# Patient Record
Sex: FEMALE | Race: WHITE | Employment: UNEMPLOYED | URBAN - METROPOLITAN AREA
[De-identification: names, ages, dates, MRNs, and addresses within clinical notes are randomized per-mention and may not be internally consistent; named-entity substitution may affect disease eponyms.]

---

## 2017-08-30 ENCOUNTER — HOSPITAL ENCOUNTER (EMERGENCY)
Age: 52
Discharge: HOME | End: 2017-08-30
Attending: EMERGENCY MEDICINE
Payer: COMMERCIAL

## 2017-08-30 VITALS
OXYGEN SATURATION: 98 % | SYSTOLIC BLOOD PRESSURE: 137 MMHG | DIASTOLIC BLOOD PRESSURE: 67 MMHG | HEART RATE: 82 BPM | RESPIRATION RATE: 18 BRPM | BODY MASS INDEX: 39.27 KG/M2 | TEMPERATURE: 99 F | WEIGHT: 230 LBS | HEIGHT: 64 IN

## 2017-08-30 DIAGNOSIS — R07.9 CHEST PAIN, UNSPECIFIED TYPE: Primary | ICD-10-CM

## 2017-08-30 DIAGNOSIS — F14.90 COCAINE USE: ICD-10-CM

## 2017-08-30 LAB
ALBUMIN SERPL-MCNC: 3.7 G/DL (ref 3.5–5)
ALBUMIN/GLOB SERPL: 1 {RATIO} (ref 1.1–2.2)
ALP SERPL-CCNC: 62 U/L (ref 45–117)
ALT SERPL-CCNC: 35 U/L (ref 12–78)
AMPHET UR QL SCN: NEGATIVE
ANION GAP SERPL CALC-SCNC: 10 MMOL/L (ref 5–15)
AST SERPL-CCNC: 25 U/L (ref 15–37)
ATRIAL RATE: 80 BPM
BARBITURATES UR QL SCN: NEGATIVE
BASOPHILS # BLD: 0 K/UL (ref 0–0.1)
BASOPHILS NFR BLD: 1 % (ref 0–1)
BENZODIAZ UR QL: NEGATIVE
BILIRUB SERPL-MCNC: 0.4 MG/DL (ref 0.2–1)
BUN SERPL-MCNC: 18 MG/DL (ref 6–20)
BUN/CREAT SERPL: 23 (ref 12–20)
CALCIUM SERPL-MCNC: 9 MG/DL (ref 8.5–10.1)
CALCULATED P AXIS, ECG09: 58 DEGREES
CALCULATED R AXIS, ECG10: -32 DEGREES
CALCULATED T AXIS, ECG11: 114 DEGREES
CANNABINOIDS UR QL SCN: NEGATIVE
CHLORIDE SERPL-SCNC: 106 MMOL/L (ref 97–108)
CO2 SERPL-SCNC: 27 MMOL/L (ref 21–32)
COCAINE UR QL SCN: POSITIVE
CREAT SERPL-MCNC: 0.77 MG/DL (ref 0.55–1.02)
DIAGNOSIS, 93000: NORMAL
DRUG SCRN COMMENT,DRGCM: ABNORMAL
EOSINOPHIL # BLD: 0.1 K/UL (ref 0–0.4)
EOSINOPHIL NFR BLD: 1 % (ref 0–7)
ERYTHROCYTE [DISTWIDTH] IN BLOOD BY AUTOMATED COUNT: 14.6 % (ref 11.5–14.5)
GLOBULIN SER CALC-MCNC: 3.8 G/DL (ref 2–4)
GLUCOSE SERPL-MCNC: 196 MG/DL (ref 65–100)
HCT VFR BLD AUTO: 41.7 % (ref 35–47)
HGB BLD-MCNC: 13.7 G/DL (ref 11.5–16)
LYMPHOCYTES # BLD: 2.3 K/UL (ref 0.8–3.5)
LYMPHOCYTES NFR BLD: 26 % (ref 12–49)
MCH RBC QN AUTO: 29.4 PG (ref 26–34)
MCHC RBC AUTO-ENTMCNC: 32.9 G/DL (ref 30–36.5)
MCV RBC AUTO: 89.5 FL (ref 80–99)
METHADONE UR QL: NEGATIVE
MONOCYTES # BLD: 0.6 K/UL (ref 0–1)
MONOCYTES NFR BLD: 7 % (ref 5–13)
NEUTS SEG # BLD: 5.8 K/UL (ref 1.8–8)
NEUTS SEG NFR BLD: 65 % (ref 32–75)
OPIATES UR QL: NEGATIVE
P-R INTERVAL, ECG05: 170 MS
PCP UR QL: NEGATIVE
PLATELET # BLD AUTO: 264 K/UL (ref 150–400)
POTASSIUM SERPL-SCNC: 4 MMOL/L (ref 3.5–5.1)
PROT SERPL-MCNC: 7.5 G/DL (ref 6.4–8.2)
Q-T INTERVAL, ECG07: 404 MS
QRS DURATION, ECG06: 118 MS
QTC CALCULATION (BEZET), ECG08: 465 MS
RBC # BLD AUTO: 4.66 M/UL (ref 3.8–5.2)
SODIUM SERPL-SCNC: 143 MMOL/L (ref 136–145)
TROPONIN I SERPL-MCNC: <0.04 NG/ML
TROPONIN I SERPL-MCNC: <0.04 NG/ML
VENTRICULAR RATE, ECG03: 80 BPM
WBC # BLD AUTO: 8.9 K/UL (ref 3.6–11)

## 2017-08-30 PROCEDURE — 80053 COMPREHEN METABOLIC PANEL: CPT | Performed by: EMERGENCY MEDICINE

## 2017-08-30 PROCEDURE — 36415 COLL VENOUS BLD VENIPUNCTURE: CPT | Performed by: EMERGENCY MEDICINE

## 2017-08-30 PROCEDURE — 80307 DRUG TEST PRSMV CHEM ANLYZR: CPT | Performed by: EMERGENCY MEDICINE

## 2017-08-30 PROCEDURE — 85025 COMPLETE CBC W/AUTO DIFF WBC: CPT | Performed by: EMERGENCY MEDICINE

## 2017-08-30 PROCEDURE — 99285 EMERGENCY DEPT VISIT HI MDM: CPT

## 2017-08-30 PROCEDURE — 93005 ELECTROCARDIOGRAM TRACING: CPT

## 2017-08-30 PROCEDURE — 84484 ASSAY OF TROPONIN QUANT: CPT | Performed by: EMERGENCY MEDICINE

## 2017-08-30 PROCEDURE — 74011250637 HC RX REV CODE- 250/637: Performed by: EMERGENCY MEDICINE

## 2017-08-30 RX ORDER — CLOPIDOGREL BISULFATE 75 MG/1
300 TABLET ORAL
Status: COMPLETED | OUTPATIENT
Start: 2017-08-30 | End: 2017-08-30

## 2017-08-30 RX ADMIN — CLOPIDOGREL 300 MG: 75 TABLET, FILM COATED ORAL at 11:25

## 2017-08-30 NOTE — ED PROVIDER NOTES
HPI Comments: 46 y.o. female with past medical history significant for coronary artery bypass graft (02/2017), hip fracture, diabetes, fibula fracture, and MI who presents from home with chief complaint of chest pain. Patient states onset of moderate, intermittent, and mid-sternal chest pain over the past 8 hours with accompanying shortness of breath. Patient also complains of associated left arm \"numbness\" stating she has trouble opening and closing her arm. Patient denies the numbness feeling like her arm falling asleep. Patient mentions hx of triple bypass at [facility] with a hx of myocardial infarction. Patient reports she typically takes Plavix and Aspirin daily. Patient notes she took a full aspirin 6 hours ago and did not take her Plavix today. Per nurse, patient apparently used crack cocaine last night, but patient does not use this on a daily basis. Patient denies having a headache. There are no other acute medical concerns at this time. Old Chart Review: Per note, patient was evaluated here on 12/14/16 for nausea and vomiting. Patient was apparently treated for a UTI and had some palpitations during this visit. Patient was discharged with Phenergan. Social hx: Tobacco Use: No (former smoker), Alcohol Use: No, Drug Use: Yes (cocaine)    PCP: Tyree Celaya MD    Note written by Rosemarie Alfaro, as dictated by Elena Key MD 10:58 AM      The history is provided by the patient and medical records. Past Medical History:   Diagnosis Date    Diabetes ([address]     Fibula fracture     right    Hip fracture (HCC)     Myocardial infarct [facility])        Past Surgical History:   Procedure Laterality Date    HX CERVICAL LAMINECTOMY      HX CORONARY ARTERY BYPASS GRAFT      February 2017    HX ORTHOPAEDIC      cervical fusion         History reviewed. No pertinent family history.     Social History     Social History    Marital status:      Spouse name: N/A   [facility] Number of children: N/A    Years of education: N/A     Occupational History    Not on file. Social History Main Topics    Smoking status: Former Smoker    Smokeless tobacco: Never Used    Alcohol use No    Drug use: Yes     Special: Cocaine    Sexual activity: No     Other Topics Concern    Not on file     Social History Narrative         ALLERGIES: Celexa [citalopram]; Diflucan [fluconazole]; Flagyl [metronidazole]; Lisinopril; Metformin; Neosporin [benzalkonium chloride]; Pcn [penicillins]; and Sulfa (sulfonamide antibiotics)    Review of Systems   Constitutional: Negative for chills and fever. HENT: Negative for ear pain and sore throat. Eyes: Negative for photophobia and pain. Respiratory: Positive for shortness of breath. Negative for chest tightness. Cardiovascular: Positive for chest pain. Negative for leg swelling. Gastrointestinal: Negative for abdominal pain, nausea and vomiting. Genitourinary: Negative for dysuria and flank pain. Musculoskeletal: Negative for back pain and neck pain. Skin: Negative for rash and wound. Neurological: Positive for numbness. Negative for dizziness, light-headedness and headaches. All other systems reviewed and are negative. Vitals:    08/30/17 1047   BP: 137/67   Pulse: 81   Resp: 18   Temp: 97.8 °F (36.6 °C)   SpO2: 95%   Weight: 104.3 kg (230 lb)   Height: 5' 4\" (1.626 m)            Physical Exam   Constitutional: She is oriented to person, place, and time. She appears well-developed and well-nourished. No distress. HENT:   Head: Normocephalic and atraumatic. Eyes: Conjunctivae and EOM are normal.   Neck: Normal range of motion. Cardiovascular: Normal rate, regular rhythm, normal heart sounds and intact distal pulses. No murmur heard. Pulmonary/Chest: Effort normal and breath sounds normal. No stridor. No respiratory distress. Abdominal: Soft. Bowel sounds are normal. There is no tenderness.    Musculoskeletal: Normal range of motion. She exhibits no edema, tenderness or deformity. Neurological: She is alert and oriented to person, place, and time. She has normal strength. No cranial nerve deficit or sensory deficit. GCS eye subscore is 4. GCS verbal subscore is 5. GCS motor subscore is 6. Skin: Skin is warm and dry. She is not diaphoretic. Psychiatric: She has a normal mood and affect. Nursing note and vitals reviewed. MDM  Number of Diagnoses or Management Options  Chest pain, unspecified type:   Cocaine use:   Diagnosis management comments: Patient with acute chest pain since early this morning - admitted to cocaine use last night. Patient with extensive cardiac history - check labs, EKG, troponin, give plavix as patient took 325mg aspirin about 5am.    1500 - repeat trop neg, +cocaine, patient advised to never use cocaine again. Amount and/or Complexity of Data Reviewed  Clinical lab tests: ordered and reviewed  Independent visualization of images, tracings, or specimens: yes    Patient Progress  Patient progress: stable    ED Course       Procedures    ED EKG interpretation:  Rhythm: normal sinus rhythm; and regular . Rate (approx.): 80; Axis: left axis deviation; ST/T wave: non-specific intraventricular conduction delay; ST &T wave abnormality, consider lateral ischemia. Prolonged QT. Abnormal EKG. No acute ST changes noted. Patient has no prior EKG in our system for comparison.   Note written by Rosemarie Stahl, as dictated by Salina Julien MD 10:51 AM

## 2017-08-30 NOTE — ED TRIAGE NOTES
Patient arrives from home for chest pain and shortness of breath that began at 0400 this morning. Reports using crack cocaine last night but does not use it on a regular basis.

## 2017-08-30 NOTE — ED NOTES
Patient ambulatory to restroom with assistance to attempt for a urine specimen. After several minutes patient states she is unable to provide a specimen. Will attempt again shortly.

## 2017-08-30 NOTE — DISCHARGE INSTRUCTIONS
Chest Pain: Care Instructions  Your Care Instructions  There are many things that can cause chest pain. Some are not serious and will get better on their own in a few days. But some kinds of chest pain need more testing and treatment. Your doctor may have recommended a follow-up visit in the next 8 to 12 hours. If you are not getting better, you may need more tests or treatment. Even though your doctor has released you, you still need to watch for any problems. The doctor carefully checked you, but sometimes problems can develop later. If you have new symptoms or if your symptoms do not get better, get medical care right away. If you have worse or different chest pain or pressure that lasts more than 5 minutes or you passed out (lost consciousness), call 911 or seek other emergency help right away. A medical visit is only one step in your treatment. Even if you feel better, you still need to do what your doctor recommends, such as going to all suggested follow-up appointments and taking medicines exactly as directed. This will help you recover and help prevent future problems. How can you care for yourself at home? · Rest until you feel better. · Take your medicine exactly as prescribed. Call your doctor if you think you are having a problem with your medicine. · Do not drive after taking a prescription pain medicine. When should you call for help? Call 911 if:  · You passed out (lost consciousness). · You have severe difficulty breathing. · You have symptoms of a heart attack. These may include:  ¨ Chest pain or pressure, or a strange feeling in your chest.  ¨ Sweating. ¨ Shortness of breath. ¨ Nausea or vomiting. ¨ Pain, pressure, or a strange feeling in your back, neck, jaw, or upper belly or in one or both shoulders or arms. ¨ Lightheadedness or sudden weakness. ¨ A fast or irregular heartbeat.   After you call 911, the  may tell you to chew 1 adult-strength or 2 to 4 low-dose aspirin. Wait for an ambulance. Do not try to drive yourself. Call your doctor today if:  · You have any trouble breathing. · Your chest pain gets worse. · You are dizzy or lightheaded, or you feel like you may faint. · You are not getting better as expected. · You are having new or different chest pain. Where can you learn more? Go to http://cintia-venancio.info/. Enter A120 in the search box to learn more about \"Chest Pain: Care Instructions. \"  Current as of: March 20, 2017  Content Version: 11.3  © 5352-6061 [employer]. Care instructions adapted under license by [employer] (which disclaims liability or warranty for this information). If you have questions about a medical condition or this instruction, always ask your healthcare professional. [address] any warranty or liability for your use of this information. We hope that we have addressed all of your medical concerns. The examination and treatment you received in the Emergency Department were for an emergent problem and were not intended as complete care. It is important that you follow up with your healthcare provider(s) for ongoing care. If your symptoms worsen or do not improve as expected, and you are unable to reach your usual health care provider(s), you should return to the Emergency Department. Today's healthcare is undergoing tremendous change, and patient satisfaction surveys are one of the many tools to assess the quality of medical care. You may receive a survey from the [employer] organization regarding your experience in the Emergency Department. I hope that your experience has been completely positive, particularly the medical care that I provided. As such, please participate in the survey; anything less than excellent does not meet my expectations or intentions.         [facility] and [employer] participate in nationally recognized quality of care measures. If your blood pressure is greater than 120/80, as reported below, we urge that you seek medical care to address the potential of high blood pressure, commonly known as hypertension. Hypertension can be hereditary or can be caused by certain medical conditions, pain, stress, or \"white coat syndrome. \"       Please make an appointment with your health care provider(s) for follow up of your Emergency Department visit. VITALS:   Patient Vitals for the past 8 hrs:   Temp Pulse Resp BP SpO2   08/30/17 1245 - 73 15 130/56 98 %   08/30/17 1115 - 78 14 125/65 93 %   08/30/17 1047 97.8 °F (36.6 °C) 84 20 141/67 93 %          Thank you for allowing us to provide you with medical care today. We realize that you have many choices for your emergency care needs. Please choose us in the future for any continued health care needs. Vianney Parker [facility], [address] [place] [address] 20.   Office: [phone]            Recent Results (from the past 24 hour(s))   TROPONIN I    Collection Time: 08/30/17 10:59 AM   Result Value Ref Range    Troponin-I, Qt. <0.04 <6.39 ng/mL   METABOLIC PANEL, COMPREHENSIVE    Collection Time: 08/30/17 10:59 AM   Result Value Ref Range    Sodium 143 136 - 145 mmol/L    Potassium 4.0 3.5 - 5.1 mmol/L    Chloride 106 97 - 108 mmol/L    CO2 27 21 - 32 mmol/L    Anion gap 10 5 - 15 mmol/L    Glucose 196 (H) 65 - 100 mg/dL    BUN 18 6 - 20 MG/DL    Creatinine 0.77 0.55 - 1.02 MG/DL    BUN/Creatinine ratio 23 (H) 12 - 20      GFR est AA >60 >60 ml/min/1.73m2    GFR est non-AA >60 >60 ml/min/1.73m2    Calcium 9.0 8.5 - 10.1 MG/DL    Bilirubin, total 0.4 0.2 - 1.0 MG/DL    ALT (SGPT) 35 12 - 78 U/L    AST (SGOT) 25 15 - 37 U/L    Alk.  phosphatase 62 45 - 117 U/L    Protein, total 7.5 6.4 - 8.2 g/dL    Albumin 3.7 3.5 - 5.0 g/dL    Globulin 3.8 2.0 - 4.0 g/dL    A-G Ratio 1.0 (L) 1.1 - 2.2     CBC WITH AUTOMATED DIFF Collection Time: 08/30/17 10:59 AM   Result Value Ref Range    WBC 8.9 3.6 - 11.0 K/uL    RBC 4.66 3.80 - 5.20 M/uL    HGB 13.7 11.5 - 16.0 g/dL    HCT 41.7 35.0 - 47.0 %    MCV 89.5 80.0 - 99.0 FL    MCH 29.4 26.0 - 34.0 PG    MCHC 32.9 30.0 - 36.5 g/dL    RDW 14.6 (H) 11.5 - 14.5 %    PLATELET 936 729 - 817 K/uL    NEUTROPHILS 65 32 - 75 %    LYMPHOCYTES 26 12 - 49 %    MONOCYTES 7 5 - 13 %    EOSINOPHILS 1 0 - 7 %    BASOPHILS 1 0 - 1 %    ABS. NEUTROPHILS 5.8 1.8 - 8.0 K/UL    ABS. LYMPHOCYTES 2.3 0.8 - 3.5 K/UL    ABS. MONOCYTES 0.6 0.0 - 1.0 K/UL    ABS. EOSINOPHILS 0.1 0.0 - 0.4 K/UL    ABS. BASOPHILS 0.0 0.0 - 0.1 K/UL   DRUG SCREEN, URINE    Collection Time: 08/30/17  1:13 PM   Result Value Ref Range    AMPHETAMINES NEGATIVE  NEG      BARBITURATES NEGATIVE  NEG      BENZODIAZEPINE NEGATIVE  NEG      COCAINE POSITIVE (A) NEG      METHADONE NEGATIVE  NEG      OPIATES NEGATIVE  NEG      PCP(PHENCYCLIDINE) NEGATIVE  NEG      THC (TH-CANNABINOL) NEGATIVE  NEG      Drug screen comment (NOTE)    TROPONIN I    Collection Time: 08/30/17  1:37 PM   Result Value Ref Range    Troponin-I, Qt. <0.04 <0.05 ng/mL       No results found.

## 2018-07-10 ENCOUNTER — OFFICE VISIT (OUTPATIENT)
Dept: FAMILY MEDICINE CLINIC | Age: 53
End: 2018-07-10

## 2018-07-10 VITALS
BODY MASS INDEX: 41.66 KG/M2 | DIASTOLIC BLOOD PRESSURE: 69 MMHG | TEMPERATURE: 98 F | WEIGHT: 244 LBS | OXYGEN SATURATION: 97 % | RESPIRATION RATE: 12 BRPM | HEIGHT: 64 IN | HEART RATE: 67 BPM | SYSTOLIC BLOOD PRESSURE: 101 MMHG

## 2018-07-10 DIAGNOSIS — Z95.1 S/P CABG X 3: ICD-10-CM

## 2018-07-10 DIAGNOSIS — M79.7 FIBROMYALGIA: ICD-10-CM

## 2018-07-10 DIAGNOSIS — M72.0 DUPUYTREN'S CONTRACTURE: ICD-10-CM

## 2018-07-10 DIAGNOSIS — R30.0 DYSURIA: ICD-10-CM

## 2018-07-10 DIAGNOSIS — Z79.4 TYPE 2 DIABETES MELLITUS WITH COMPLICATION, WITH LONG-TERM CURRENT USE OF INSULIN (HCC): ICD-10-CM

## 2018-07-10 DIAGNOSIS — M54.2 NECK PAIN: ICD-10-CM

## 2018-07-10 DIAGNOSIS — R26.89 BALANCE PROBLEM: ICD-10-CM

## 2018-07-10 DIAGNOSIS — M96.89 NONUNION OF STERNUM AFTER STERNOTOMY: Primary | ICD-10-CM

## 2018-07-10 DIAGNOSIS — R07.89 STERNUM PAIN: ICD-10-CM

## 2018-07-10 DIAGNOSIS — I10 ESSENTIAL HYPERTENSION: ICD-10-CM

## 2018-07-10 DIAGNOSIS — E11.8 TYPE 2 DIABETES MELLITUS WITH COMPLICATION, WITH LONG-TERM CURRENT USE OF INSULIN (HCC): ICD-10-CM

## 2018-07-10 DIAGNOSIS — R82.998 LEUKOCYTES IN URINE: ICD-10-CM

## 2018-07-10 DIAGNOSIS — H01.9 DERMATITIS OF EYELID OF RIGHT EYE: ICD-10-CM

## 2018-07-10 DIAGNOSIS — M54.9 BACK PAIN, UNSPECIFIED BACK LOCATION, UNSPECIFIED BACK PAIN LATERALITY, UNSPECIFIED CHRONICITY: ICD-10-CM

## 2018-07-10 DIAGNOSIS — M79.2 NEUROPATHIC PAIN: ICD-10-CM

## 2018-07-10 DIAGNOSIS — L21.9 SEBORRHEIC DERMATITIS: ICD-10-CM

## 2018-07-10 DIAGNOSIS — Z13.5 SCREENING FOR EYE CONDITION: ICD-10-CM

## 2018-07-10 LAB
ALBUMIN UR QL STRIP: NORMAL MG/L
CREATININE, URINE POC: NORMAL MG/DL
MICROALBUMIN/CREAT RATIO POC: >300 MG/G

## 2018-07-10 RX ORDER — METOPROLOL TARTRATE 25 MG/1
25 TABLET, FILM COATED ORAL
COMMUNITY
End: 2018-10-02

## 2018-07-10 RX ORDER — POTASSIUM CHLORIDE 750 MG/1
10 CAPSULE, EXTENDED RELEASE ORAL
COMMUNITY
End: 2018-10-02

## 2018-07-10 RX ORDER — CLOPIDOGREL BISULFATE 75 MG/1
75 TABLET ORAL
COMMUNITY
End: 2018-09-10 | Stop reason: SDUPTHER

## 2018-07-10 RX ORDER — HYDROCODONE BITARTRATE AND ACETAMINOPHEN 5; 325 MG/1; MG/1
1 TABLET ORAL
Qty: 20 TAB | Refills: 0 | Status: SHIPPED | OUTPATIENT
Start: 2018-07-10 | End: 2018-08-10

## 2018-07-10 RX ORDER — ROSUVASTATIN CALCIUM 20 MG/1
20 TABLET, COATED ORAL
COMMUNITY
End: 2018-09-10 | Stop reason: SDUPTHER

## 2018-07-10 RX ORDER — GABAPENTIN 300 MG/1
3 CAPSULE ORAL
COMMUNITY
End: 2018-10-22 | Stop reason: SDUPTHER

## 2018-07-10 RX ORDER — FENOFIBRATE 160 MG/1
160 TABLET ORAL
COMMUNITY
End: 2019-02-12 | Stop reason: SDUPTHER

## 2018-07-10 RX ORDER — LIDOCAINE 50 MG/G
PATCH TOPICAL
COMMUNITY
Start: 2018-04-27 | End: 2018-09-14 | Stop reason: SDUPTHER

## 2018-07-10 NOTE — MR AVS SNAPSHOT
[facility] 
 
 
 [address] [place] [address] 
[phone] Patient: Dionicio Jaramillo MRN: [id number] :1965 Visit Information Date & Time Provider Department Dept. Phone Encounter #  
 7/10/2018 11:00 AM Anjana Olivas MD [address] [facility] [phone] [id number] Upcoming Health Maintenance Date Due Hepatitis C Screening 1965 DTaP/Tdap/Td series (1 - Tdap) 1986 PAP AKA CERVICAL CYTOLOGY 1986 BREAST CANCER SCRN MAMMOGRAM 2015 FOBT Q 1 YEAR AGE 50-75 2015 Influenza Age 5 to Adult 2018 Allergies as of 7/10/2018  Review Complete On: 7/10/2018 By: Anjana Olivas MD  
  
 Severity Noted Reaction Type Reactions Celexa [Citalopram]  2014    Other (comments) Diflucan [Fluconazole]  2014    Nausea and Vomiting Flagyl [Metronidazole]  2014    Hives Lisinopril  2014    Other (comments) Low BP Metformin  2014    Other (comments) Neosporin [Benzalkonium Chloride]  2014    Other (comments) Pcn [Penicillins]  2014    Hives Sulfa (Sulfonamide Antibiotics)  2014    Hives Current Immunizations  Never Reviewed No immunizations on file. Not reviewed this visit You Were Diagnosed With   
  
 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 
ICD-9-CM: 998.89 Sternum pain     ICD-10-CM: R07.89 ICD-9-CM: 786.50 Fibromyalgia     ICD-10-CM: M79.7 ICD-9-CM: 729.1 Seborrheic dermatitis     ICD-10-CM: L21.9 ICD-9-CM: 690.10 Dermatitis of eyelid of right eye     ICD-10-CM: H01.9 ICD-9-CM: 373.31 Dupuytren's contracture     ICD-10-CM: M72.0 ICD-9-CM: 728.6 S/P CABG x 3     ICD-10-CM: Z95.1 ICD-9-CM: V45.81 Type 2 diabetes mellitus with complication, with long-term current use of insulin (HCC)     ICD-10-CM: E11.8, Z79.4 ICD-9-CM: 250.90, V58.67   
 Dysuria     ICD-10-CM: R30.0 ICD-9-CM: 788.1 Essential hypertension     ICD-10-CM: I10 
ICD-9-CM: 401.9 Balance problem     ICD-10-CM: R26.89 
ICD-9-CM: 781.99 Neck pain     ICD-10-CM: M54.2 ICD-9-CM: 723.1 Back pain, unspecified back location, unspecified back pain laterality, unspecified chronicity     ICD-10-CM: M54.9 ICD-9-CM: 724.5 Neuropathic pain     ICD-10-CM: M79.2 ICD-9-CM: 729.2 Screening for eye condition     ICD-10-CM: Z13.5 ICD-9-CM: V80.2 Leukocytes in urine     ICD-10-CM: R82.99 
ICD-9-CM: 791.7 Vitals BP Pulse Temp Resp Height(growth percentile) Weight(growth percentile) 101/69 (BP 1 Location: Left arm, BP Patient Position: Sitting) 67 98 °F (36.7 °C) 12 5' 4\" (1.626 m) 244 lb (110.7 kg) SpO2 BMI OB Status Smoking Status 97% 41.88 kg/m2 Postmenopausal Former Smoker BMI and BSA Data Body Mass Index Body Surface Area  
 41.88 kg/m 2 2.24 m 2 Preferred Pharmacy Pharmacy Name Phone [address] [place] [address] [phone] Your Updated Medication List  
  
   
This list is accurate as of 7/10/18 12:42 PM.  Always use your most recent med list.  
  
  
  
  
 clopidogrel 75 mg Tab Commonly known as:  PLAVIX Take 75 mg by mouth. DULoxetine 60 mg capsule Commonly known as:  CYMBALTA Take 60 mg by mouth daily. fenofibrate 160 mg tablet Commonly known as:  LOFIBRA Take 160 mg by mouth. HumaLOG U-100 Insulin 100 unit/mL injection Generic drug:  insulin lispro HYDROcodone-acetaminophen 5-325 mg per tablet Commonly known as:  [address] Take 1 Tab by mouth every eight (8) hours as needed for Pain. Max Daily Amount: 3 Tabs. Insulin Syringe-Needle U-100 1 mL 31 gauge x 5/16 Syrg LANTUS U-100 INSULIN 100 unit/mL injection Generic drug:  insulin glargine  
  
 lidocaine 5 % Commonly known as:  Myesha Brizuela  
  
 metoprolol tartrate 25 mg tablet Commonly known as:  LOPRESSOR Take 25 mg by mouth. NEURONTIN 300 mg capsule Generic drug:  gabapentin Take 3 Tabs by mouth. ONETOUCH ULTRA TEST strip Generic drug:  glucose blood VI test strips  
  
 pantoprazole 40 mg tablet Commonly known as:  PROTONIX  
  
 potassium chloride SA 10 mEq capsule Commonly known as:  Sharrell Risser Take 10 mEq by mouth. rosuvastatin 20 mg tablet Commonly known as:  CRESTOR Take 20 mg by mouth. Prescriptions Printed Refills HYDROcodone-acetaminophen (NORCO) 5-325 mg per tablet 0 Sig: Take 1 Tab by mouth every eight (8) hours as needed for Pain. Max Daily Amount: 3 Tabs. Class: Print Route: Oral  
  
We Performed the Following AMB POC URINE, MICROALBUMIN, SEMIQUANT (3 RESULTS) [53722 CPT(R)] CULTURE, URINE [id number] CPT(R)] REFERRAL TO DIABETIC EDUCATION [REF20 Custom] REFERRAL TO NEUROLOGY [UJV61 Custom] REFERRAL TO OPHTHALMOLOGY [REF57 Custom] Referral Information Referral ID Referred By Referred To  
  
 [id number] Donato DOS SANTOS MD   
   [address] [place] [address] Phone: [phone] Fax: [phone] Visits Status Start Date End Date 1 New Request 7/10/18 7/10/19 If your referral has a status of pending review or denied, additional information will be sent to support the outcome of this decision. Referral ID Referred By Referred To  
 [id number] Olimpia DOS SANTOS MD  
   [address] [facility], [address] Phone: [phone] Fax: [phone] Visits Status Start Date End Date 1 New Request 7/10/18 7/10/19 If your referral has a status of pending review or denied, additional information will be sent to support the outcome of this decision. Referral ID Referred By Referred To  
 [id number] Nyasia DOS SANTOS Not Available Visits Status Start Date End Date 1 New Request 7/10/18 7/10/19 If your referral has a status of pending review or denied, additional information will be sent to support the outcome of this decision. Introducing [facility]! [facility] introduces [employer] patient portal. Now you can access parts of your medical record, email your doctor's office, and request medication refills online. 1. In your internet browser, go to [email or web address] 2. Click on the First Time User? Click Here link in the Sign In box. You will see the New Member Sign Up page. 3. Enter your [employer] Access Code exactly as it appears below. You will not need to use this code after youve completed the sign-up process. If you do not sign up before the expiration date, you must request a new code. · [employer] Access Code: [facility] Expires: 10/8/2018 12:42 PM 
 
4. Enter the last four digits of your Social Security Number (xxxx) and Date of Birth (mm/dd/yyyy) as indicated and click Submit. You will be taken to the next sign-up page. 5. Create a [employer] ID. This will be your [employer] login ID and cannot be changed, so think of one that is secure and easy to remember. 6. Create a [employer] password. You can change your password at any time. 7. Enter your Password Reset Question and Answer. This can be used at a later time if you forget your password. 8. Enter your e-mail address. You will receive e-mail notification when new information is available in [address] 9. Click Sign Up. You can now view and download portions of your medical record. 10. Click the Download Summary menu link to download a portable copy of your medical information. If you have questions, please visit the Frequently Asked Questions section of the [employer] website. Remember, [employer] is NOT to be used for urgent needs. For medical emergencies, dial 911. Now available from your iPhone and Android! Please provide this summary of care documentation to your next provider. Your primary care clinician is listed as Kathrin Rojas. If you have any questions after today's visit, please call [phone].

## 2018-07-10 NOTE — PROGRESS NOTES
PARIS Stovall is a 46 y.o. female who presents to establish care. Has a long in varied medical history. Based on what she told me a lot of her problems stem from a cervical spine injury that she sustained from a massage. This caused her to spend quite a lot of time in the hospital recovering and learning to walk again. She required neurologists and neurosurgeons as a result. [place] [facility]. She feels like everything kind of snowballed from there. Has been diagnosed with fibromyalgia and is taking Cymbalta and gabapentin for this. She aches everywhere. Has trouble getting out of bed some mornings. She has done aquatic therapy in the past but was wiped out for 2 days afterward. She has tried Lyrica in the past but had bad side effects which she described as gained 40 pounds and unable to get off the sofa for 2 months. She is diabetic, sees Dr. Sara Hilario. Her last A1c was 3 months ago and was 9.3. She feels like this is pretty good. She is taking a very high dose of Lantus and Humalog. She thinks that her diet is good because \"I eat plenty of starches\". Has potatoes multiple times per day every days. Apparently she tried the [employer] one time and her sugar got down to 1001 50 and even had low blood sugar of 51 time. This worried her into eating whole box of cookies and her blood sugar has been higher since. She has extensive cardiac history. Has had several stents and a three-vessel CABG. Is currently taking metoprolol, Plavix, aspirin. Was taking ACE inhibitor but even at 2.5 mg her blood pressure was too low. She has \"white outs\" where she will get flushed, sweaty, feel nauseous. Get tunnel vision and feel like she is going to pass out. This happens on a daily basis. Sometimes it happens shortly after she gets up and sometimes she could have been on her feet for a long time before this happens.     She has gastroparesis probably related to her diabetes. Sees Dr. Lieberman Skill gastroenterology. Had colonoscopy. Was taking Bentyl at one point but then just started watching what she eats and that seemed to help    PMHx:  Past Medical History:   Diagnosis Date    [facility]     Fibula fracture     right    Hip fracture ([facility]     Myocardial infarct ([facility]        Meds:   Current Outpatient Prescriptions   Medication Sig Dispense Refill    rosuvastatin (CRESTOR) 20 mg tablet Take 20 mg by mouth.  fenofibrate (LOFIBRA) 160 mg tablet Take 160 mg by mouth.  metoprolol tartrate (LOPRESSOR) 25 mg tablet Take 25 mg by mouth.  gabapentin (NEURONTIN) 300 mg capsule Take 3 Tabs by mouth.  potassium chloride SA (MICRO-K) 10 mEq capsule Take 10 mEq by mouth.  lidocaine (LIDODERM) 5 %       HYDROcodone-acetaminophen (NORCO) 5-325 mg per tablet Take 1 Tab by mouth every eight (8) hours as needed for Pain. Max Daily Amount: 3 Tabs. 20 Tab 0    ONETOUCH ULTRA TEST strip       DULoxetine (CYMBALTA) 60 mg capsule Take 60 mg by mouth daily.  LANTUS 100 unit/mL injection       HUMALOG 100 unit/mL injection       pantoprazole (PROTONIX) 40 mg tablet       Insulin Syringe-Needle U-100 1 mL 31 x 5/16\" syrg   3    clopidogrel (PLAVIX) 75 mg tab Take 75 mg by mouth. Allergies:    Allergies   Allergen Reactions    Celexa [Citalopram] Other (comments)    Diflucan [Fluconazole] Nausea and Vomiting    Flagyl [Metronidazole] Hives    Lisinopril Other (comments)     Low BP    Metformin Other (comments)    Neosporin [Benzalkonium Chloride] Other (comments)    Pcn [Penicillins] Hives    Sulfa (Sulfonamide Antibiotics) Hives       Smoker:  History   Smoking Status    Former Smoker   Smokeless Tobacco    Never Used       ETOH:   History   Alcohol Use No       FH:   Family History   Problem Relation Age of Onset    Cancer Mother     Diabetes Mother     Heart Disease Mother     Heart Disease Father     Diabetes Father        ROS:   As listed in HPI. In addition:  Constitutional:   No headache, fever, fatigue, weight loss or weight gain      Cardiac:    No chest pain      Resp:   No cough or shortness of breath      Neuro   No loss of consciousness, dizziness, seizures      Physical Exam:  Blood pressure 101/69, pulse 67, temperature 98 °F (36.7 °C), resp. rate 12, height 5' 4\" (1.626 m), weight 244 lb (110.7 kg), SpO2 97 %. GEN: No apparent distress. Alert and oriented and responds to all questions appropriately. NEUROLOGIC:  No focal neurologic deficits. Strength and sensation grossly intact. Coordination and gait grossly intact. EXT: Well perfused. No edema. SKIN: No obvious rashes. 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 Austen Kaye cardiothoracic surgeon at [address] [place] [address].  No plan to repair this until her diabetes is under better. Every now and then she will move wrong and get pain in her sternotomy site. She has mistaken this for heart attack before and is letter to the emergency room. She was prescribed Norco which she has taken once  or twice per week for this pain and has kept her out of the emergency room.  reviewed. We talked about the risks of chronic opiate therapy. We talked about the possibility of NSAIDs. Taking only once or twice per week opiates are probably fine for this problem if it will keep her to the emergency room. Will refill the [place].    Fibromyalgia  Is taking Cymbalta 60 mg  Failed Lyrica, side effects  Gabapentin  No muscle relaxers at the moment  Is not currently doing any physical therapy or exercise regime. She has done aquatic therapy but was floored for 2 days afterward which turned her off. I directed her back to physical therapy and aquatic therapy.   Will try the wife for their \"diabetes prevention program\" to see if we get a discount  I think she will benefit from pain psychology given all of her other issues  [facility]. Might also benefit from physical medicine and rehabilitation but did not refer yet. Extensive cardiac history, Dr. Tammie Pantoja will be seeing her cardiologistVCS  Next week    Diabetes  Dr. Paula Walton  Last A1c 9.3  3 months ago. Will be seeing him next week. When she watches her diet her sugar she goes down too low which spooks her into eating unhealthy. Needs to watch the starches. Diabetic education, nutrition referral.  If she watches her diet and needs her insulin to use I told her not to be scared but to ask for help in doing this    Neuropathic pain  Is taking quite a lot of gabapentin. Understand the plan is to try to wean down on this  [place] Dr. Charlotte Brumfield [facility] [place]  Needs a referral to a new neurologist    Like a referral to optometry, Dr. Danae Bergeron    Is established with [facility] gynecology but has not been in a while. Due for a Pap smear. Is not currently getting mammograms because of the sternotomy    Had a colonoscopy done with Dr. Mary Luciano    She is concerned about a UTI, has had dysuria for about a month. It is intermittent. 1+ leuks in urine. Was sent for culture before treating. 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 Need to be very careful antibiotics    She has pain at the palmar aspect of the fourth MCP. Appears to be a mild Dupuytren's contracture. She would like to see if physical therapy can help with this. For seborrheic dermatitis and eyelid dermatitis. Use the shampoo that she is currently using on the scalp. Avoid using any products on the eyelid. Which is currently using which is coconut oil and baby shampoo is probably making the problem worse    She would benefit from close follow-up. Would like to see me in 2 weeks      ICD-10-CM ICD-9-CM    1. 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  Sternum pain R07.89 786.50 HYDROcodone-acetaminophen (NORCO) 5-325 mg per tablet   3. Fibromyalgia M79.7 729.1    4. Seborrheic dermatitis L21.9 690.10    5. Dermatitis of eyelid of right eye H01.9 373.31    6. Dupuytren's contracture M72.0 728.6    7. S/P CABG x 3 Z95.1 V45.81    8. Type 2 diabetes mellitus with complication, with long-term current use of insulin (HCC) E11.8 250.90 AMB POC URINE, MICROALBUMIN, SEMIQUANT (3 RESULTS)    Z79.4 V58.67 REFERRAL TO DIABETIC EDUCATION   9. Dysuria R30.0 788.1    10. Essential hypertension I10 401.9    11. Balance problem R26.89 781.99    12. Neck pain M54.2 723.1    13. Back pain, unspecified back location, unspecified back pain laterality, unspecified chronicity M54.9 724.5    14. Neuropathic pain M79.2 729.2 REFERRAL TO NEUROLOGY   15. Screening for eye condition Z13.5 V80.2 REFERRAL TO OPHTHALMOLOGY   16. Leukocytes in urine R82.99 791.7 CULTURE, URINE       AVS given. Pt expressed understanding of instructions    This was a 65-minute visit.  Greater than 50% of the time was spent counseling the patient on fibromyalgia, sternum pain, diabetes, hand pain neck pain back pain, dysuria etc.

## 2018-07-11 ENCOUNTER — TELEPHONE (OUTPATIENT)
Dept: FAMILY MEDICINE CLINIC | Age: 53
End: 2018-07-11

## 2018-07-11 LAB
BACTERIA UR CULT: NORMAL
BACTERIA UR CULT: NORMAL

## 2018-07-11 NOTE — TELEPHONE ENCOUNTER
Cultures reviewed. No apparent pathologic bacteria. Does not appear to have urinary tract infection.   Please reassure patient that she does not need antibiotic

## 2018-07-12 ENCOUNTER — TELEPHONE (OUTPATIENT)
Dept: DIABETES SERVICES | Age: 53
End: 2018-07-12

## 2018-07-12 ENCOUNTER — TELEPHONE (OUTPATIENT)
Dept: FAMILY MEDICINE CLINIC | Age: 53
End: 2018-07-12

## 2018-07-12 NOTE — TELEPHONE ENCOUNTER
MsDmitri Stovall notified Cultures reviewed. No apparent pathologic bacteria. Does not appear to have urinary tract infection. Please reassure patient that she does not need antibiotic. She voiced understanding.

## 2018-07-16 ENCOUNTER — TELEPHONE (OUTPATIENT)
Dept: FAMILY MEDICINE CLINIC | Age: 53
End: 2018-07-16

## 2018-07-16 NOTE — TELEPHONE ENCOUNTER
Patient states she has mid chest pain. Patient states chest bone feels more prominent. Patient states hydrocodone is not helping with pain.

## 2018-07-19 NOTE — TELEPHONE ENCOUNTER
Spoke with patient yesterday evening. Her concern about her chest pain is the possibility that it might be getting worse. She described a few motions that she is doing that seemed to \"grind\" her sternum together and she feels like her pain is more constant than it typically is. She has taken 3 of the Norco so far which is a little more than she has had to use in the past.  It is making the pain more bearable but is not reducing the pain entirely. Based on her description of the pain I think it is likely she will need to be coached on certain stretches and exercises which we can do at our visit next week. However if she feels like it is getting worse it would be best to bring this up with your surgeon.     No change in medication

## 2018-07-23 ENCOUNTER — TELEPHONE (OUTPATIENT)
Dept: FAMILY MEDICINE CLINIC | Age: 53
End: 2018-07-23

## 2018-07-23 NOTE — TELEPHONE ENCOUNTER
Patient called to say she accidentally just took 100U of humalog . Normally she takes 30U. She states her current BG is 359, and she took the insulin about 15 minutes ago. Advised her to go to the ER for unintentional medication overdose as she is high risk for hypoglycemia in the next 4-6 hours.

## 2018-07-30 ENCOUNTER — HOSPITAL ENCOUNTER (OUTPATIENT)
Dept: DIABETES SERVICES | Age: 53
Discharge: HOME | End: 2018-07-30
Payer: COMMERCIAL

## 2018-07-30 PROCEDURE — G0109 DIAB MANAGE TRN IND/GROUP: HCPCS

## 2018-07-30 PROCEDURE — G0109 DIAB MANAGE TRN IND/GROUP: HCPCS | Performed by: DIETITIAN, REGISTERED

## 2018-08-02 NOTE — DIABETES MGMT
7/30/2018      Dear Sarah Funes MD,    Thank you for your  kind referral.  Your patient, Wilber Stovall, attended  Session #1 at [place] [address] where the following topics were covered today:     *Describing diabetes disease process and treatment options    *Incorporating nutrition management into their lifestyle    *Monitoring blood glucose and other parameters and interpreting and using the results for self management decision making    *Preventing, detecting and treating acute complications    *Incorporating physical activity into lifestyle   *Using medications safely and for maximum therapeutic effectiveness    * Developing personal strategies to promote health and behavior change    *Developing personal strategies to address psychosocial issues and concerns        Data from first visit:    Weight: 7/30/2018 250 #    HgbA1c:  7/30/2018 10.1 %        Increased risk for diabetes: 5.7-6.4%, Diabetes >6.4%    Glycemic control for adults with diabetes:  < 7%         Elderly or multiple medical conditions  <8%      Random blood glucose: 7/30/2018 Pre-Breakfast 201 mg/dl  Meter given: Contour Next  Goal(s) set : Goal 1: check blood sugar 2 hours after eating    Your patient will have two (2) additional appointments to complete the ordered education. Their next visit is scheduled for 8/13/18    We look forward to assisting your patient in meeting their self-management goals.   If you have any questions, please do not hesitate to call the [facility] at [phone]      Sincerely,  Armando Garnett RD, [address] [facility] [place] [address] [place] [address], [address]    Phone: [phone] Fax: [phone]

## 2018-08-10 ENCOUNTER — OFFICE VISIT (OUTPATIENT)
Dept: FAMILY MEDICINE CLINIC | Age: 53
End: 2018-08-10

## 2018-08-10 VITALS
HEIGHT: 64 IN | RESPIRATION RATE: 14 BRPM | DIASTOLIC BLOOD PRESSURE: 69 MMHG | OXYGEN SATURATION: 92 % | WEIGHT: 247 LBS | TEMPERATURE: 98 F | HEART RATE: 92 BPM | SYSTOLIC BLOOD PRESSURE: 109 MMHG | BODY MASS INDEX: 42.17 KG/M2

## 2018-08-10 DIAGNOSIS — M96.89 NONUNION OF STERNUM AFTER STERNOTOMY: ICD-10-CM

## 2018-08-10 DIAGNOSIS — R07.89 STERNUM PAIN: ICD-10-CM

## 2018-08-10 DIAGNOSIS — Z95.1 S/P CABG X 3: ICD-10-CM

## 2018-08-10 DIAGNOSIS — M54.9 BACK PAIN, UNSPECIFIED BACK LOCATION, UNSPECIFIED BACK PAIN LATERALITY, UNSPECIFIED CHRONICITY: Primary | ICD-10-CM

## 2018-08-10 DIAGNOSIS — M79.2 NEUROPATHIC PAIN: ICD-10-CM

## 2018-08-10 DIAGNOSIS — M79.7 FIBROMYALGIA: ICD-10-CM

## 2018-08-10 RX ORDER — METFORMIN HYDROCHLORIDE 1000 MG/1
1000 TABLET ORAL 2 TIMES DAILY WITH MEALS
COMMUNITY
End: 2019-06-05

## 2018-08-10 RX ORDER — OXYCODONE AND ACETAMINOPHEN 7.5; 325 MG/1; MG/1
1 TABLET ORAL
Qty: 20 TAB | Refills: 0 | Status: SHIPPED | OUTPATIENT
Start: 2018-08-10 | End: 2018-10-04 | Stop reason: SDUPTHER

## 2018-08-10 RX ORDER — DICLOFENAC SODIUM 75 MG/1
75 TABLET, DELAYED RELEASE ORAL
Qty: 30 TAB | Refills: 2 | Status: SHIPPED | OUTPATIENT
Start: 2018-08-10 | End: 2019-06-05

## 2018-08-10 NOTE — MR AVS SNAPSHOT
[address] [facility] 
 
 
 [address] [place] [address] 
[phone] Patient: Skylar Hays MRN: [id number] :[id number] Visit Information Date & Time Provider Department Dept. Phone Encounter #  
 8/10/2018 11:00 AM Rolan Connor MD [address] [facility] [phone] [id number] Your Appointments 2018  9:00 AM  
New Patient with Florinda Castro MD  
[facility] Appt Note: NP Appointment, Spine, Referred by Dr. Raul Chan [phone], 07/10/18; R/S, Maryjane Alexander [phone] Dr. Reymundo Pascual; new pt, referred by  
 [address] [place] [address], 
[id number], [address] [address]  
[address] [facility], [address] [place] [address], [address] [place] [address] [phone] Upcoming Health Maintenance Date Due Hepatitis C Screening 1965 DTaP/Tdap/Td series (1 - Tdap) 1986 PAP AKA CERVICAL CYTOLOGY 1986 BREAST CANCER SCRN MAMMOGRAM 2015 FOBT Q 1 YEAR AGE 50-75 2015 Influenza Age 5 to Adult 2018 Allergies as of 8/10/2018  Review Complete On: 8/10/2018 By: Argelia Hicks Severity Noted Reaction Type Reactions Celexa [Citalopram]  2014    Other (comments) Diflucan [Fluconazole]  2014    Nausea and Vomiting Flagyl [Metronidazole]  2014    Hives Lisinopril  2014    Other (comments) Low BP Metformin  2014    Other (comments) Neosporin [Benzalkonium Chloride]  2014    Other (comments) Pcn [Penicillins]  2014    Hives Sulfa (Sulfonamide Antibiotics)  2014    Hives Current Immunizations  Never Reviewed No immunizations on file. Not reviewed this visit You Were Diagnosed With   
  
 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 
ICD-9-CM: 998.89 Sternum pain     ICD-10-CM: R07.89 ICD-9-CM: 786.50 Fibromyalgia     ICD-10-CM: M79.7 ICD-9-CM: 729.1 Vitals BP Pulse Temp Resp Height(growth percentile) Weight(growth percentile) 109/69 (BP 1 Location: Left arm, BP Patient Position: Sitting) 92 98 °F (36.7 °C) 14 5' 4\" (1.626 m) 247 lb (112 kg) SpO2 BMI OB Status Smoking Status 92% 42.4 kg/m2 Postmenopausal Former Smoker Vitals History BMI and BSA Data Body Mass Index Body Surface Area  
 42.4 kg/m 2 2.25 m 2 Preferred Pharmacy Pharmacy Name Phone [address] [place] [address] [phone] Your Updated Medication List  
  
   
This list is accurate as of 8/10/18 12:26 PM.  Always use your most recent med list.  
  
  
  
  
 clopidogrel 75 mg Tab Commonly known as:  PLAVIX Take 75 mg by mouth. diclofenac EC 75 mg EC tablet Commonly known as:  VOLTAREN Take 1 Tab by mouth two (2) times daily as needed. DULoxetine 60 mg capsule Commonly known as:  CYMBALTA Take 60 mg by mouth daily. fenofibrate 160 mg tablet Commonly known as:  LOFIBRA Take 160 mg by mouth. HumaLOG U-100 Insulin 100 unit/mL injection Generic drug:  insulin lispro Insulin Syringe-Needle U-100 1 mL 31 gauge x 5/16 Syrg LANTUS U-100 INSULIN 100 unit/mL injection Generic drug:  insulin glargine  
  
 lidocaine 5 % Commonly known as:  LIDODERM  
  
 metFORMIN 1,000 mg tablet Commonly known as:  GLUCOPHAGE Take 1,000 mg by mouth two (2) times daily (with meals). metoprolol tartrate 25 mg tablet Commonly known as:  LOPRESSOR Take 25 mg by mouth. NEURONTIN 300 mg capsule Generic drug:  gabapentin Take 3 Tabs by mouth. ONETOUCH ULTRA TEST strip Generic drug:  glucose blood VI test strips  
  
 oxyCODONE-acetaminophen 7.5-325 mg per tablet Commonly known as:  PERCOCET 7.5 Take 1 Tab by mouth every eight (8) hours as needed for Pain. Max Daily Amount: 3 Tabs. pantoprazole 40 mg tablet Commonly known as:  PROTONIX  
  
 potassium chloride SA 10 mEq capsule Commonly known as:  [employer] Take 10 mEq by mouth. rosuvastatin 20 mg tablet Commonly known as:  CRESTOR Take 20 mg by mouth. TENS Units [place] Parkinson Apply to right lower back for 30 min daily as needed for los back pain Prescriptions Printed Refills TENS Units scott 2 Sig: Apply to right lower back for 30 min daily as needed for los back pain  
 Class: Print  
 oxyCODONE-acetaminophen (PERCOCET 7.5) 7.5-325 mg per tablet 0 Sig: Take 1 Tab by mouth every eight (8) hours as needed for Pain. Max Daily Amount: 3 Tabs. Class: Print Route: Oral  
  
Prescriptions Sent to Pharmacy Refills  
 diclofenac EC (VOLTAREN) 75 mg EC tablet 2 Sig: Take 1 Tab by mouth two (2) times daily as needed. Class: Normal  
 Pharmacy: [facility] [address] [place] [address] #: [phone] Route: Oral  
  
To-Do List   
 08/13/2018 1:00 PM  
  Appointment with DIABETES CLASS #2 MRM at [facility] DI[facility]IC TRE[facility] ([phone])  
  
 09/17/2018 1:00 PM  
  Appointment with DIABETES CLASS #3 MRM at hospitals DIABETIC TREATMENT ([phone]) Introducing [facility]! Jeovany Gill introduces [employer] patient portal. Now you can access parts of your medical record, email your doctor's office, and request medication refills online. 1. In your internet browser, go to [email or web address] 2. Click on the First Time User? Click Here link in the Sign In box. You will see the New Member Sign Up page. 3. Enter your [employer] Access Code exactly as it appears below.  You will not need to use this code after youve completed the sign-up process. If you do not sign up before the expiration date, you must request a new code. · [employer] Access Code: [facility] Expires: 10/8/2018 12:42 PM 
 
4. Enter the last four digits of your Social Security Number (xxxx) and Date of Birth (mm/dd/yyyy) as indicated and click Submit. You will be taken to the next sign-up page. 5. Create a [employer] ID. This will be your [employer] login ID and cannot be changed, so think of one that is secure and easy to remember. 6. Create a [employer] password. You can change your password at any time. 7. Enter your Password Reset Question and Answer. This can be used at a later time if you forget your password. 8. Enter your e-mail address. You will receive e-mail notification when new information is available in [address] 9. Click Sign Up. You can now view and download portions of your medical record. 10. Click the Download Summary menu link to download a portable copy of your medical information. If you have questions, please visit the Frequently Asked Questions section of the [employer] website. Remember, [employer] is NOT to be used for urgent needs. For medical emergencies, dial 911. Now available from your iPhone and Android! Please provide this summary of care documentation to your next provider. Your primary care clinician is listed as Kendra Snowshaheed. If you have any questions after today's visit, please call [phone].

## 2018-08-10 NOTE — PROGRESS NOTES
PARIS Stovall is a 46 y.o. female who presents to follow-up on recent medical issues. Her primary issue at the moment is her sternotomy following CABG. She has nonunion of the sternotomy and this has not been repaired because of hyperglycemia. She will be seeing her surgeon on 8/28. She shared with her surgeon that she feels like her sternum is moving and she feels like it is getting worse. We have been treating her pain. Still has a few tablets left of the 20 Norco provided 1 month ago. She has been requiring the pain medication and every fifth day or so. When she takes the [address] she is found that she needs to take 2 pills at a time. Rarely she will have to take an additional dose later in the day if her pain is particularly bad. She takes Tylenol in addition to the [address] and will take Tylenol occasionally on days where she does not feel like her pain is bad enough to warrant opiate. She is not taking anti-inflammatories because of Plavix etc.    The pain gets so bad at times that it makes her vomit. It is evolving in that now it radiates under her left breast instead of staying localized at the sternum    She has started diabetic education. A1c was 10. Systolic Dr. Willian Bellamy was started on metformin 1000 mg twice daily    Is seeing physical therapy for her back pain. She is finding TENS unit very helpful. Applied for 30 minutes of blaster for several hours. Most helpful on the right side back      From previous note:    Based on what she told me a lot of her problems stem from a cervical spine injury that she sustained from a massage. This caused her to spend quite a lot of time in the hospital recovering and learning to walk again. She required neurologists and neurosurgeons as a result. [place] [facility]. She feels like everything kind of snowballed from there. Has been diagnosed with fibromyalgia and is taking Cymbalta and gabapentin for this.   She aches everywhere. Has trouble getting out of bed some mornings. She has done aquatic therapy in the past but was wiped out for 2 days afterward. She has tried Lyrica in the past but had bad side effects which she described as gained 40 pounds and unable to get off the sofa for 2 months. She is diabetic, sees Dr. Payton Campbell. Her last A1c was 3 months ago and was 9.3. She feels like this is pretty good. She is taking a very high dose of Lantus and Humalog. She thinks that her diet is good because \"I eat plenty of starches\". Has potatoes multiple times per day every day. Apparently she tried the [employer] one time and her sugar got down to 1001 50 and even had low blood sugar of 51 time. This worried her into eating whole box of cookies and her blood sugar has been higher since. She has extensive cardiac history. Has had several stents and a three-vessel CABG. Is currently taking metoprolol, Plavix, aspirin. Was taking ACE inhibitor but even at 2.5 mg her blood pressure was too low. She has \"white outs\" where she will get flushed, sweaty, feel nauseous. Get tunnel vision and feel like she is going to pass out. This happens on a daily basis. Sometimes it happens shortly after she gets up and sometimes she could have been on her feet for a long time before this happens. She has gastroparesis probably related to her diabetes. Sees Dr. Erica Watkins gastroenterology. Had colonoscopy. Was taking Bentyl at one point but then just started watching what she eats and that seemed to help    PMHx:  Past Medical History:   Diagnosis Date    [facility]     Fibula fracture     right    Hip fracture ([facility]     Myocardial infarct ([facility]        Meds:   Current Outpatient Prescriptions   Medication Sig Dispense Refill    metFORMIN (GLUCOPHAGE) 1,000 mg tablet Take 1,000 mg by mouth two (2) times daily (with meals).       diclofenac EC (VOLTAREN) 75 mg EC tablet Take 1 Tab by mouth two (2) times daily as needed. 30 Tab 2    TENS Units scott Apply to right lower back for 30 min daily as needed for los back pain 1 Device 2    oxyCODONE-acetaminophen (PERCOCET 7.5) 7.5-325 mg per tablet Take 1 Tab by mouth every eight (8) hours as needed for Pain. Max Daily Amount: 3 Tabs. 20 Tab 0    rosuvastatin (CRESTOR) 20 mg tablet Take 20 mg by mouth.  fenofibrate (LOFIBRA) 160 mg tablet Take 160 mg by mouth.  metoprolol tartrate (LOPRESSOR) 25 mg tablet Take 25 mg by mouth.  gabapentin (NEURONTIN) 300 mg capsule Take 3 Tabs by mouth.  clopidogrel (PLAVIX) 75 mg tab Take 75 mg by mouth.  potassium chloride SA (MICRO-K) 10 mEq capsule Take 10 mEq by mouth.  lidocaine (LIDODERM) 5 %       ONETOUCH ULTRA TEST strip       DULoxetine (CYMBALTA) 60 mg capsule Take 60 mg by mouth daily.  LANTUS 100 unit/mL injection       HUMALOG 100 unit/mL injection       pantoprazole (PROTONIX) 40 mg tablet       Insulin Syringe-Needle U-100 1 mL 31 x 5/16\" syrg   3       Allergies: Allergies   Allergen Reactions    Celexa [Citalopram] Other (comments)    Diflucan [Fluconazole] Nausea and Vomiting    Flagyl [Metronidazole] Hives    Lisinopril Other (comments)     Low BP    Metformin Other (comments)    Neosporin [Benzalkonium Chloride] Other (comments)    Pcn [Penicillins] Hives    Sulfa (Sulfonamide Antibiotics) Hives       Smoker:  History   Smoking Status    Former Smoker   Smokeless Tobacco    Never Used       ETOH:   History   Alcohol Use No       FH:   Family History   Problem Relation Age of Onset    Cancer Mother     Diabetes Mother     Heart Disease Mother     Heart Disease Father     Diabetes Father        ROS:   As listed in HPI.  In addition:  Constitutional:   No headache, fever, fatigue, weight loss or weight gain      Cardiac:    No chest pain      Resp:   No cough or shortness of breath      Neuro   No loss of consciousness, dizziness, seizures      Physical Exam:  Blood pressure 109/69, pulse 92, temperature 98 °F (36.7 °C), resp. rate 14, height 5' 4\" (1.626 m), weight 247 lb (112 kg), SpO2 92 %. GEN: No apparent distress. Alert and oriented and responds to all questions appropriately. NEUROLOGIC:  No focal neurologic deficits. Strength and sensation grossly intact. Coordination and gait grossly intact. EXT: Well perfused. No edema. SKIN: No obvious rashes. 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 Drew Calixto cardiothoracic surgeon at [facility].  No plan to repair this until her diabetes is under better control but patient said surgeon expressed concern that the sternum is \"moving\" so may plan sooner intervention. Sternotomy pain    We will slightly increase from Lynn to Percocet. Only take 1 Percocet at a time. Made this very clear. She is only requiring opiates once or twice per week  Add diclofenac. I believe the benefit outweighs risk. Do not want her escalating the dose of the opiates. Do not want accidental overdose        Fibromyalgia  Is taking Cymbalta 60 mg  Failed Lyrica, side effects  Gabapentin  No muscle relaxers at the moment  Physical therapy is helping  I have referred to [facility] but she will not be seen until October    Seeing [facility] for cardiology      Diabetes  Dr. Tiffanie Ghotra   added metformin 1000 mg twice daily  A1c 10 earlier this month at diabetic education  Is unsure what her blood sugar is these days because she cannot get her new meters to work. She will be asking a diabetic education for help with this    Neuropathic pain  Is taking quite a lot of gabapentin.   Understand the plan is to try to wean down on this  Warned her of the risk of possible external overdose with gabapentin and opiate  Seeing Dr. Chapis Mills [facility] neurosurgeon  Needs a referral to a new neurologist    Like he is to see by .  Dr. Nuzhat Ramirez does not take her insurance    Is [place] [facility] but has not been in a while. Due for a Pap smear. Is not currently getting mammograms because of the sternotomy    Had a colonoscopy done with Dr. 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 Need to be very careful antibiotics        ICD-10-CM ICD-9-CM    1. Back pain, unspecified back location, unspecified back pain laterality, unspecified chronicity M54.9 724.5 TENS Units scott   2. Neuropathic pain M79.2 729.2    3. S/P CABG x 3 Z95.1 V45.81    4. 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 Sternum pain R07.89 786.50 diclofenac EC (VOLTAREN) 75 mg EC tablet      oxyCODONE-acetaminophen (PERCOCET 7.5) 7.5-325 mg per tablet   6. Fibromyalgia M79.7 729.1        AVS given.  Pt expressed understanding of instructions

## 2018-08-13 ENCOUNTER — TELEPHONE (OUTPATIENT)
Dept: FAMILY MEDICINE CLINIC | Age: 53
End: 2018-08-13

## 2018-08-13 NOTE — TELEPHONE ENCOUNTER
Patient got sunburnt yesterday. Patient is weak, nauseous, vomiting and cramping. Patient is a diabetic. Patient advised to go to ED for evaluation. Patient verbalized understanding.

## 2018-08-22 ENCOUNTER — OFFICE VISIT (OUTPATIENT)
Dept: FAMILY MEDICINE CLINIC | Age: 53
End: 2018-08-22

## 2018-08-22 VITALS
SYSTOLIC BLOOD PRESSURE: 107 MMHG | HEART RATE: 67 BPM | OXYGEN SATURATION: 92 % | TEMPERATURE: 98.5 F | DIASTOLIC BLOOD PRESSURE: 70 MMHG | RESPIRATION RATE: 16 BRPM | BODY MASS INDEX: 42.34 KG/M2 | HEIGHT: 64 IN | WEIGHT: 248 LBS

## 2018-08-22 DIAGNOSIS — R00.1 BRADYCARDIA: Primary | ICD-10-CM

## 2018-08-22 DIAGNOSIS — I95.9 HYPOTENSION, UNSPECIFIED HYPOTENSION TYPE: ICD-10-CM

## 2018-08-22 DIAGNOSIS — R42 DIZZINESS: ICD-10-CM

## 2018-08-22 DIAGNOSIS — E78.1 HYPERTRIGLYCERIDEMIA: ICD-10-CM

## 2018-08-22 RX ORDER — MECLIZINE HYDROCHLORIDE 25 MG/1
25 TABLET ORAL
Qty: 60 TAB | Refills: 1 | Status: SHIPPED | OUTPATIENT
Start: 2018-08-22 | End: 2019-06-05

## 2018-08-22 NOTE — PROGRESS NOTES
.  Chief Complaint   Patient presents with   [facility] Follow Up     . Brii De La Cruz

## 2018-08-22 NOTE — PROGRESS NOTES
[place]  Edgard Stovall is a 48 y.o. female who presents to follow-up on hospitalization at [facility]. She was evidently out in the sun all day and felt weak, nauseous, cramping. She was told to go to the emergency room for these symptoms. When she arrived to the day where she had the above-mentioned symptoms but also started having a burning chest pain. She was admitted to work this up. Review the available record. She was ultimately diagnosed with a trichomonas UTI? [place] She was given her home dose of metoprolol but started having a junctional rhythm with bradycardia. Patient got sleepy, diaphoretic. She reports that she went unresponsive while speaking with the pharmacist.  Was transferred to the unit. Cardiology cut down her metoprolol to 12.5 mg twice daily and asked her to continue this as an outpatient. She is ultimately treated with 2 g of Flagyl for the trichomoniasis. She has hives with Flagyl and sure enough had a reaction. This appears to have improved at this point. She is having diarrhea since her antibiotic. She is still having that today but feels like it is lightening up a little bit. She has a history of C. difficile. Is complaining of frontal sinus pressure, intermittent vertigo and disequilibrium. When she gets dizzy she will have a episode of diaphoresis and nausea    PMHx:  Past Medical History:   Diagnosis Date    [facility]     Fibula fracture     right    [facility] fracture [facility]     Myocardial infarct ([facility]        Meds:   Current Outpatient Prescriptions   Medication Sig Dispense Refill    meclizine (ANTIVERT) 25 mg tablet Take 1 Tab by mouth three (3) times daily as needed. 60 Tab 1    metFORMIN (GLUCOPHAGE) 1,000 mg tablet Take 1,000 mg by mouth two (2) times daily (with meals).  diclofenac EC (VOLTAREN) 75 mg EC tablet Take 1 Tab by mouth two (2) times daily as needed.  30 Tab 2    oxyCODONE-acetaminophen (PERCOCET 7.5) 7.5-325 mg per tablet Take 1 Tab by mouth every eight (8) hours as needed for Pain. Max Daily Amount: 3 Tabs. 20 Tab 0    rosuvastatin (CRESTOR) 20 mg tablet Take 20 mg by mouth.  fenofibrate (LOFIBRA) 160 mg tablet Take 160 mg by mouth.  metoprolol tartrate (LOPRESSOR) 25 mg tablet Take 25 mg by mouth.  gabapentin (NEURONTIN) 300 mg capsule Take 3 Tabs by mouth.  clopidogrel (PLAVIX) 75 mg tab Take 75 mg by mouth.  lidocaine (LIDODERM) 5 %       ONETOUCH ULTRA TEST strip       DULoxetine (CYMBALTA) 60 mg capsule Take 60 mg by mouth daily.  LANTUS 100 unit/mL injection       HUMALOG 100 unit/mL injection       pantoprazole (PROTONIX) 40 mg tablet       Insulin Syringe-Needle U-100 1 mL 31 x 5/16\" syrg   3    TENS Units scott Apply to right lower back for 30 min daily as needed for los back pain 1 Device 2    potassium chloride SA (MICRO-K) 10 mEq capsule Take 10 mEq by mouth. Allergies: Allergies   Allergen Reactions    Celexa [Citalopram] Other (comments)    Diflucan [Fluconazole] Nausea and Vomiting    Flagyl [Metronidazole] Hives    Lisinopril Other (comments)     Low BP    Metformin Other (comments)    Neosporin [Benzalkonium Chloride] Other (comments)    Pcn [Penicillins] Hives    Sulfa (Sulfonamide Antibiotics) Hives       Smoker:  History   Smoking Status    Former Smoker   Smokeless Tobacco    Never Used       ETOH:   History   Alcohol Use No       FH:   Family History   Problem Relation Age of Onset    Cancer Mother     Diabetes Mother     Heart Disease Mother     Heart Disease Father     Diabetes Father        ROS:   As listed in HPI. In addition:  Constitutional:   No headache, fever, fatigue, weight loss or weight gain      Cardiac:    No chest pain      Resp:   No cough or shortness of breath      Neuro   No loss of consciousness, dizziness, seizures      Physical Exam:  Blood pressure 107/70, pulse 67, temperature 98.5 °F (36.9 °C), resp.  rate 16, height 5' 4\" (1.626 m), weight 248 lb (112.5 kg), SpO2 92 %. GEN: No apparent distress. Alert and oriented and responds to all questions appropriately. NEUROLOGIC:  No focal neurologic deficits. Strength and sensation grossly intact. Coordination and gait grossly intact. EXT: Well perfused. No edema. SKIN: No obvious rashes. Lungs clear to auscultation bilaterally  CV regular rate and rhythm no murmur  Oral mucosa is dry, nares are not congested low down but are quite congested higher up. Frontal sinus pain to palpation and percussion bilaterally       Assessment and Plan     My impression is that dehydration and heat exposure brought her to the hospital based on the history and lab work that I have available. She had an episode of syncope in the hospital that is concerning her. She is having occasional dizziness that seems to be happening when she stands up although orthostatics are fine today      Frontal sinus pressure, disequilibrium, vertigo  Frontal sinusitis versus frontal sinus pressure  Flonase  Meclizine    Hypotension  Orthostatic are fine and in fact her blood pressure improved from 95 to 107 when we got around to checking this    Bradycardia  Vagal response? EKGwe have an EKG from 1 year ago. No significant change. Still has left anterior fascicular block    Currently taking metoprolol 12.5 mg twice daily. Continue this for now the low threshold for stopping this    Triglycerides over 800 in the hospital, check this today  CBC  CMP    She will be seeing her thoracic surgeon 28th of this month (next week) and currently does not have follow-up with her cardiologist until middle of next month    Labs reviewed. Potassium is a little high glucose 300. Insulin deficiency? Triglycerides very elevated around 500. Not as bad as in the hospital (800) taking max dose of fenofibrate. Might be able to increase Crestor to 40 mg.   No change at the moment          ICD-10-CM ICD-9-CM    1. Bradycardia R00.1 427.89 AMB POC EKG ROUTINE W/ 12 LEADS, INTER & REP   2. Hypotension, unspecified hypotension type I95.9 458.9 CBC WITH AUTOMATED DIFF      METABOLIC PANEL, COMPREHENSIVE   3. Dizziness R42 780.4 meclizine (ANTIVERT) 25 mg tablet   4. Hypertriglyceridemia E78.1 272.1 LIPID PANEL       AVS given.  Pt expressed understanding of instructions

## 2018-08-23 ENCOUNTER — OFFICE VISIT (OUTPATIENT)
Dept: NEUROLOGY | Age: 53
End: 2018-08-23

## 2018-08-23 ENCOUNTER — TELEPHONE (OUTPATIENT)
Dept: FAMILY MEDICINE CLINIC | Age: 53
End: 2018-08-23

## 2018-08-23 VITALS
HEIGHT: 64 IN | BODY MASS INDEX: 42.68 KG/M2 | HEART RATE: 74 BPM | OXYGEN SATURATION: 97 % | DIASTOLIC BLOOD PRESSURE: 70 MMHG | SYSTOLIC BLOOD PRESSURE: 130 MMHG | WEIGHT: 250 LBS

## 2018-08-23 DIAGNOSIS — M54.12 CERVICAL RADICULOPATHY: Primary | ICD-10-CM

## 2018-08-23 DIAGNOSIS — M62.838 MUSCLE SPASMS OF BOTH LOWER EXTREMITIES: ICD-10-CM

## 2018-08-23 DIAGNOSIS — R25.1 TREMOR: ICD-10-CM

## 2018-08-23 DIAGNOSIS — G95.9 CERVICAL MYELOPATHY (HCC): ICD-10-CM

## 2018-08-23 PROBLEM — E66.01 OBESITY, MORBID (HCC): Status: ACTIVE | Noted: 2018-08-23

## 2018-08-23 LAB
ALBUMIN SERPL-MCNC: 4 G/DL (ref 3.5–5.5)
ALBUMIN/GLOB SERPL: 1.5 {RATIO} (ref 1.2–2.2)
ALP SERPL-CCNC: 62 IU/L (ref 39–117)
ALT SERPL-CCNC: 30 IU/L (ref 0–32)
AST SERPL-CCNC: 21 IU/L (ref 0–40)
BASOPHILS # BLD AUTO: 0.1 X10E3/UL (ref 0–0.2)
BASOPHILS NFR BLD AUTO: 1 %
BILIRUB SERPL-MCNC: <0.2 MG/DL (ref 0–1.2)
BUN SERPL-MCNC: 21 MG/DL (ref 6–24)
BUN/CREAT SERPL: 21 (ref 9–23)
CALCIUM SERPL-MCNC: 9.5 MG/DL (ref 8.7–10.2)
CHLORIDE SERPL-SCNC: 98 MMOL/L (ref 96–106)
CHOLEST SERPL-MCNC: 202 MG/DL (ref 100–199)
CO2 SERPL-SCNC: 25 MMOL/L (ref 20–29)
CREAT SERPL-MCNC: 0.98 MG/DL (ref 0.57–1)
EOSINOPHIL # BLD AUTO: 0.3 X10E3/UL (ref 0–0.4)
EOSINOPHIL NFR BLD AUTO: 4 %
ERYTHROCYTE [DISTWIDTH] IN BLOOD BY AUTOMATED COUNT: 14.5 % (ref 12.3–15.4)
GLOBULIN SER CALC-MCNC: 2.7 G/DL (ref 1.5–4.5)
GLUCOSE SERPL-MCNC: 297 MG/DL (ref 65–99)
HCT VFR BLD AUTO: 42.9 % (ref 34–46.6)
HDLC SERPL-MCNC: 37 MG/DL
HGB BLD-MCNC: 14.1 G/DL (ref 11.1–15.9)
IMM GRANULOCYTES # BLD: 0 X10E3/UL (ref 0–0.1)
IMM GRANULOCYTES NFR BLD: 0 %
INTERPRETATION, 910389: NORMAL
LDLC SERPL CALC-MCNC: ABNORMAL MG/DL (ref 0–99)
LYMPHOCYTES # BLD AUTO: 2.4 X10E3/UL (ref 0.7–3.1)
LYMPHOCYTES NFR BLD AUTO: 33 %
MCH RBC QN AUTO: 30.8 PG (ref 26.6–33)
MCHC RBC AUTO-ENTMCNC: 32.9 G/DL (ref 31.5–35.7)
MCV RBC AUTO: 94 FL (ref 79–97)
MONOCYTES # BLD AUTO: 0.6 X10E3/UL (ref 0.1–0.9)
MONOCYTES NFR BLD AUTO: 8 %
NEUTROPHILS # BLD AUTO: 4 X10E3/UL (ref 1.4–7)
NEUTROPHILS NFR BLD AUTO: 54 %
PLATELET # BLD AUTO: 315 X10E3/UL (ref 150–379)
POTASSIUM SERPL-SCNC: 5.8 MMOL/L (ref 3.5–5.2)
PROT SERPL-MCNC: 6.7 G/DL (ref 6–8.5)
RBC # BLD AUTO: 4.58 X10E6/UL (ref 3.77–5.28)
SODIUM SERPL-SCNC: 134 MMOL/L (ref 134–144)
TRIGL SERPL-MCNC: 505 MG/DL (ref 0–149)
VLDLC SERPL CALC-MCNC: ABNORMAL MG/DL (ref 5–40)
WBC # BLD AUTO: 7.3 X10E3/UL (ref 3.4–10.8)

## 2018-08-23 RX ORDER — AMITRIPTYLINE HYDROCHLORIDE 10 MG/1
TABLET, FILM COATED ORAL
Qty: 90 TAB | Refills: 5 | Status: SHIPPED | OUTPATIENT
Start: 2018-08-23 | End: 2019-06-05

## 2018-08-23 RX ORDER — METHOCARBAMOL 500 MG/1
TABLET, FILM COATED ORAL
Qty: 120 TAB | Refills: 3 | Status: SHIPPED | OUTPATIENT
Start: 2018-08-23 | End: 2019-06-05

## 2018-08-23 NOTE — PATIENT INSTRUCTIONS
Office Policies  o Phone calls/patient messages:  Please allow up to 24 hours for someone in the office to contact you about your call or message. Be mindful your provider may be out of the office or your message may require further review. We encourage you to use [employer] for your messages as this is a faster, more efficient way to communicate with our office  o Medication Refills:  Prescription medications require up to 48 business hours to process. We encourage you to use [employer] for your refills. For controlled medications: Please allow up to 72 business hours to process. Certain medications may require you to  a written prescription at our office. NO narcotic/controlled medications will be prescribed after 4pm Monday through Friday or on weekends  o Form/Paperwork Completion:  Please note there is a $25 fee for all paperwork completed by our providers. We ask that you allow 7-14 business days. Pre-payment is due prior to picking up/faxing the completed form. You may also download your forms to [employer] to have your doctor print off.  o Test Results: In order for a patient to obtain test results, an appointment must be made with the physician to review the results. Test results cannot be discussed over the phone.

## 2018-08-23 NOTE — TELEPHONE ENCOUNTER
Labs are reviewed. Blood sugar 300. Potassium a little elevated. Make sure that she is not taking a potassium supplement.   Potassium should come down with better control of her blood sugar so will make sure she is paying special attention to that

## 2018-08-23 NOTE — MR AVS SNAPSHOT
[address], 
[id number], [address] [place] [id number] Patient: Candy House MRN: [id number] :1965 Visit Information Date & Time Provider Department Dept. Phone Encounter #  
 2018  9:00 AM Agapito Campbell MD [facility] [phone] [id number] Follow-up Instructions Return in about 3 months (around 2018). Your Appointments 2018  3:00 PM  
PROCEDURE with Anne Yoon MD  
Neurology Clinic at [address]) Appt Note: procedure EMG  both arms and one leg $ 0 CP jll 18  
 [address] [place] [address], [address] [phone]  
[address] [facility], [address] [place] [address], [address] [phone]  
  
    
 2018  8:20 AM  
Follow Up with Agapito Campbell MD  
Neurology Clinic at [address]) Appt Note: follow up EMG results $ 0 CP 18  
 [address] [place] [address], [address] [phone]  
[address] [facility], [address] [place] [address], [address] [phone] Upcoming Health Maintenance Date Due Hepatitis C Screening 1965 DTaP/Tdap/Td series (1 - Tdap) 1986 PAP AKA CERVICAL CYTOLOGY 1986 BREAST CANCER SCRN MAMMOGRAM 2015 FOBT Q 1 YEAR AGE 50-75 2015 Influenza Age 5 to Adult 2018 Allergies as of 2018  Review Complete On: 2018 By: Nicole Guidry LPN Severity Noted Reaction Type Reactions Celexa [Citalopram]  2014    Other (comments) Diflucan [Fluconazole]  2014    Nausea and Vomiting Flagyl [Metronidazole]  2014    Hives Lisinopril  2014    Other (comments) Low BP Metformin  2014    Other (comments) Neosporin [Benzalkonium Chloride]  2014    Other (comments) Pcn [Penicillins]  08/08/2014    Hives Sulfa (Sulfonamide Antibiotics)  08/08/2014    Hives Current Immunizations  Never Reviewed No immunizations on file. Not reviewed this visit You Were Diagnosed With   
  
 Codes Comments Cervical radiculopathy    -  Primary ICD-10-CM: M54.12 
ICD-9-CM: 723.4 Muscle spasms of both lower extremities     ICD-10-CM: J88.792 ICD-9-CM: 728.85 Tremor     ICD-10-CM: R25.1 ICD-9-CM: 781.0 Cervical myelopathy (HCC)     ICD-10-CM: G95.9 ICD-9-CM: 721.1 Vitals BP Pulse Height(growth percentile) Weight(growth percentile) SpO2 BMI  
 130/70 74 5' 4\" (1.626 m) 250 lb (113.4 kg) 97% 42.91 kg/m2 OB Status Smoking Status Postmenopausal Former Smoker Vitals History BMI and BSA Data Body Mass Index Body Surface Area 42.91 kg/m 2 2.26 m 2 Preferred Pharmacy Pharmacy Name Phone [address] [place] [phone] Your Updated Medication List  
  
   
This list is accurate as of 8/23/18  9:52 AM.  Always use your most recent med list.  
  
  
  
  
 amitriptyline 10 mg tablet Commonly known as:  ELAVIL  
1 p.o. nightly for 2 weeks then 2 p.o. nightly for 2 weeks then 3 p.o. nightly. Indications: NEUROPATHIC PAIN  
  
 clopidogrel 75 mg Tab Commonly known as:  PLAVIX Take 75 mg by mouth. diclofenac EC 75 mg EC tablet Commonly known as:  VOLTAREN Take 1 Tab by mouth two (2) times daily as needed. DULoxetine 60 mg capsule Commonly known as:  CYMBALTA Take 60 mg by mouth daily. fenofibrate 160 mg tablet Commonly known as:  LOFIBRA Take 160 mg by mouth. fluticasone 50 mcg/actuation inhaler Commonly known as:  FLOVENT DISKUS Take  by inhalation. HumaLOG U-100 Insulin 100 unit/mL injection Generic drug:  insulin lispro Insulin Syringe-Needle U-100 1 mL 31 gauge x 5/16 Syrg LANTUS U-100 INSULIN 100 unit/mL injection Generic drug:  insulin glargine  
  
 lidocaine 5 % Commonly known as:  LIDODERM  
  
 meclizine 25 mg tablet Commonly known as:  ANTIVERT Take 1 Tab by mouth three (3) times daily as needed. metFORMIN 1,000 mg tablet Commonly known as:  GLUCOPHAGE Take 1,000 mg by mouth two (2) times daily (with meals). methocarbamol 500 mg tablet Commonly known as:  ROBAXIN  
1 p.o. every 6 hours as needed spasms  Indications: Muscle Spasm  
  
 metoprolol tartrate 25 mg tablet Commonly known as:  LOPRESSOR Take 25 mg by mouth. NEURONTIN 300 mg capsule Generic drug:  gabapentin Take 3 Tabs by mouth. ONETOUCH ULTRA TEST strip Generic drug:  glucose blood VI test strips  
  
 oxyCODONE-acetaminophen 7.5-325 mg per tablet Commonly known as:  PERCOCET 7.5 Take 1 Tab by mouth every eight (8) hours as needed for Pain. Max Daily Amount: 3 Tabs. pantoprazole 40 mg tablet Commonly known as:  PROTONIX  
  
 potassium chloride SA 10 mEq capsule Commonly known as:  Jeffery Rump Take 10 mEq by mouth. rosuvastatin 20 mg tablet Commonly known as:  CRESTOR Take 20 mg by mouth. TENS Units Vonna Minks Apply to right lower back for 30 min daily as needed for los back pain Prescriptions Sent to Pharmacy Refills  
 amitriptyline (ELAVIL) 10 mg tablet 5 Si p.o. nightly for 2 weeks then 2 p.o. nightly for 2 weeks then 3 p.o. nightly. Indications: NEUROPATHIC PAIN Class: Normal  
 Pharmacy: [facility] [address], [place] Ph #: [phone]  
 methocarbamol (ROBAXIN) 500 mg tablet 3 Si p.o. every 6 hours as needed spasms  Indications: Muscle Spasm Class: Normal  
 Pharmacy: [facility] [address], [place] Ph #: [phone] We Performed the Following REFERRAL TO NEUROLOGY [NZO39 Custom] Comments: EMG/NCS of bilateral upper extremities and one leg. Diabetic neuropathy present, question symptoms from peripheral or cervical cord and upper extremities. Follow-up Instructions Return in about 3 months (around 11/23/2018). To-Do List   
 09/06/2018 5:30 PM  
  Appointment with DIABETES CLASS #2 MRM at [facility] DIABETIC TREATMENT ([phone])  
  
 09/17/2018 1:00 PM  
  Appointment with DIABETES CLASS #3 MRM at [facility] DIABETIC TREATMENT ([phone]) Referral Information Referral ID Referred By Referred To  
  
 [id number] Prudence Blackwell MD   
   [facility] [place] [address] [address], [address] Phone: [phone] Fax: [phone] Visits Status Start Date End Date 1 New Request 8/23/18 8/23/19 If your referral has a status of pending review or denied, additional information will be sent to support the outcome of this decision. Patient Instructions Office Policies 
o Phone calls/patient messages: Please allow up to 24 hours for someone in the office to contact you about your call or message. Be mindful your provider may be out of the office or your message may require further review. We encourage you to use [employer] for your messages as this is a faster, more efficient way to communicate with our office 
o Medication Refills: 
Prescription medications require up to 48 business hours to process. We encourage you to use [employer] for your refills. For controlled medications: Please allow up to 72 business hours to process. Certain medications may require you to  a written prescription at our office. NO narcotic/controlled medications will be prescribed after 4pm Monday through Friday or on weekends 
o Form/Paperwork Completion: 
Please note there is a $25 fee for all paperwork completed by our providers. We ask that you allow 7-14 business days.  Pre-payment is due prior to picking up/faxing the completed form. You may also download your forms to [employer] to have your doctor print off. 
o Test Results: In order for a patient to obtain test results, an appointment must be made with the physician to review the results. Test results cannot be discussed over the phone. Introducing [facility] SERVICES! [employer] introduces [employer] patient portal. Now you can access parts of your medical record, email your doctor's office, and request medication refills online. 1. In your internet browser, go to [email or web address] 2. Click on the First Time User? Click Here link in the Sign In box. You will see the New Member Sign Up page. 3. Enter your [employer] Access Code exactly as it appears below. You will not need to use this code after youve completed the sign-up process. If you do not sign up before the expiration date, you must request a new code. · [employer] Access Code: [facility] Expires: 10/8/2018 12:42 PM 
 
4. Enter the last four digits of your Social Security Number (xxxx) and Date of Birth (mm/dd/yyyy) as indicated and click Submit. You will be taken to the next sign-up page. 5. Create a [employer] ID. This will be your [employer] login ID and cannot be changed, so think of one that is secure and easy to remember. 6. Create a [employer] password. You can change your password at any time. 7. Enter your Password Reset Question and Answer. This can be used at a later time if you forget your password. 8. Enter your e-mail address. You will receive e-mail notification when new information is available in [address] 9. Click Sign Up. You can now view and download portions of your medical record. 10. Click the Download Summary menu link to download a portable copy of your medical information. If you have questions, please visit the Frequently Asked Questions section of the [employer] website.  Remember, [employer] is NOT to be used for urgent needs. For medical emergencies, dial 911. Now available from your iPhone and Android! Please provide this summary of care documentation to your next provider. Your primary care clinician is listed as Rolan Connor. If you have any questions after today's visit, please call [phone].

## 2018-08-23 NOTE — LETTER
8/23/2018 11:23 AM 
 
Patient:  Andria Price YOB: 1965 Date of Visit: 8/23/2018 Dear Eliana Lopez MD 
[address] [facility] [address] [facility] [address] VIA In Basket 
 : Thank you for referring Ms. Tomas Stovall to me for evaluation/treatment. Below are the relevant portions of my assessment and plan of care. HISTORY OF PRESENT ILLNESS Sena Stovall is a 48 y.o. female. 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 She apparently developed spasticity number of years ago and was found to have cervical spinal stenosis. She underwent surgery for cervical spinal stenosis and walked better but now has problems with jerking tremors in her upper and lower extremities. She also has trouble with severe numbness in her upper extremities, she says she has had significant numbness in her feet and lower legs for a long time. She does have diabetes. New Patient The history is provided by the patient. This is a chronic problem. Review of Systems Constitutional:  
     Review of systems is positive for anxiety, chest pain, falls, fatigue, muscle pain, muscle weakness, neuropathy, shortness of breath, snoring and difficulty swallowing. The review of systems done all others negative Current Outpatient Prescriptions on File Prior to Visit Medication Sig Dispense Refill  metFORMIN (GLUCOPHAGE) 1,000 mg tablet Take 1,000 mg by mouth two (2) times daily (with meals).  diclofenac EC (VOLTAREN) 75 mg EC tablet Take 1 Tab by mouth two (2) times daily as needed. 30 Tab 2  
 oxyCODONE-acetaminophen (PERCOCET 7.5) 7.5-325 mg per tablet Take 1 Tab by mouth every eight (8) hours as needed for Pain. Max Daily Amount: 3 Tabs. 20 Tab 0  
 rosuvastatin (CRESTOR) 20 mg tablet Take 20 mg by mouth.  fenofibrate (LOFIBRA) 160 mg tablet Take 160 mg by mouth.  gabapentin (NEURONTIN) 300 mg capsule Take 3 Tabs by mouth.  clopidogrel (PLAVIX) 75 mg tab Take 75 mg by mouth.  lidocaine (LIDODERM) 5 %  ONETOUCH ULTRA TEST strip  DULoxetine (CYMBALTA) 60 mg capsule Take 60 mg by mouth daily.  LANTUS 100 unit/mL injection  HUMALOG 100 unit/mL injection  Insulin Syringe-Needle U-100 1 mL 31 x 5/16\" syrg   3  
 meclizine (ANTIVERT) 25 mg tablet Take 1 Tab by mouth three (3) times daily as needed. 60 Tab 1  TENS Units scott Apply to right lower back for 30 min daily as needed for los back pain 1 Device 2  
 metoprolol tartrate (LOPRESSOR) 25 mg tablet Take 25 mg by mouth.  potassium chloride SA (MICRO-K) 10 mEq capsule Take 10 mEq by mouth.  pantoprazole (PROTONIX) 40 mg tablet No current facility-administered medications on file prior to visit. Past Medical History:  
Diagnosis Date  Diabetes ([facility] [address]  Fibula fracture   
 right  Hip fracture ([facility] [address]  Myocardial infarct ([facility] [address] Family History Problem Relation Age of Onset  Cancer Mother  Diabetes Mother  Heart Disease Mother  Heart Disease Father  Diabetes Father Social History Substance Use Topics  Smoking status: Former Smoker  Smokeless tobacco: Never Used  Alcohol use No  
 
/70  Pulse 74  Ht 5' 4\" (1.626 m)  Wt 250 lb (113.4 kg)  SpO2 97%  BMI 42.91 kg/m2 Physical Exam  
Constitutional: She appears well-developed and well-nourished. No distress. HENT:  
Head: Normocephalic and atraumatic. Mouth/Throat: Oropharynx is clear and moist. No oropharyngeal exudate. Eyes: Conjunctivae and EOM are normal. Pupils are equal, round, and reactive to light. No scleral icterus. Neck: Normal range of motion. Neck supple. No thyromegaly present. Cardiovascular: Normal rate, regular rhythm and normal heart sounds. No murmur heard. Musculoskeletal: Normal range of motion. She exhibits edema and tenderness. She exhibits no deformity. She is markedly tender in her feet. Lymphadenopathy:  
  She has no cervical adenopathy. Neurological:  
Beach language and mentation normal cranial nerves II through XII are normal carotid pulses are brisk and no bruits she has hypersensitive upper extremities especially the hands and fingers. She has undergone carpal tunnel repair on both hands. She does have weakness of her APBs bilaterally. Deep tendon reflexes are trace in her upper extremities that are trace at her knees they are absent at her ankles. She has marked decreased vibratory sensation in her feet. Her gait is abnormal most likely due to decreased sensory input from feet. She is not spastic. Skin: Skin is warm and dry. She is not diaphoretic. Psychiatric: She has a normal mood and affect. Her behavior is normal. Judgment and thought content normal.  
 
 
ASSESSMENT and PLAN 
GAIT DISORDER 
 has a mild spastic gait disorder undoubtedly due to the cervical myelopathy she suffered. Nothing to be done for that. UE HAND NUMBNESS I suspect she has recurrent carpal tunnel syndrome in both hands. I will set her up for an EMG of both upper extremities. We will determine whether she has any ulnar neuropathy or median neuropathy or whether this is potentially all cervical neuropathy. LE NUMBNESS This lady has diabetic neuropathy in her feet to a significant degree. This combined with her cervical myelopathy is leading to considerable problems and use of her legs. All I can do is attempt to treat the pain. I will add an EMG of one lower extremity to the to upper extremities to delineate the degree of neuropathy involved SPASMS/TREMORS I suspect these spasms/tremors that she is having are a result of her myelopathy. I am going to try her on some methocarbamol 500 mg 4 times daily as needed, we will see if that makes a significant difference. I will plan to see her back in 3 months and I will see her on the day of her EMG. DIABETES MELLITUS Stroke Risk, stable, managed by primary care HYPERLIPIDEMIA Stroke risk, stable, managed by primary care This note will not be viewable in [address] If you have questions, please do not hesitate to call me. I look forward to following Ms. Stovall along with you. Sincerely, Chapis Govea MD

## 2018-08-23 NOTE — TELEPHONE ENCOUNTER
Patient verified her name and . Patient given lab results per Dr. Glendale Kocher. Patient verbalized understanding.

## 2018-08-23 NOTE — PROGRESS NOTES
HISTORY OF PRESENT ILLNESS  Edgard Stovall is a 48 y.o. female. 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 She apparently developed spasticity number of years ago and was found to have cervical spinal stenosis. She underwent surgery for cervical spinal stenosis and walked better but now has problems with jerking tremors in her upper and lower extremities. She also has trouble with severe numbness in her upper extremities, she says she has had significant numbness in her feet and lower legs for a long time. She does have diabetes. New Patient   The history is provided by the patient. This is a chronic problem. Review of Systems   Constitutional:        Review of systems is positive for anxiety, chest pain, falls, fatigue, muscle pain, muscle weakness, neuropathy, shortness of breath, snoring and difficulty swallowing. The review of systems done all others negative     Current Outpatient Prescriptions on File Prior to Visit   Medication Sig Dispense Refill    metFORMIN (GLUCOPHAGE) 1,000 mg tablet Take 1,000 mg by mouth two (2) times daily (with meals).  diclofenac EC (VOLTAREN) 75 mg EC tablet Take 1 Tab by mouth two (2) times daily as needed. 30 Tab 2    oxyCODONE-acetaminophen (PERCOCET 7.5) 7.5-325 mg per tablet Take 1 Tab by mouth every eight (8) hours as needed for Pain. Max Daily Amount: 3 Tabs. 20 Tab 0    rosuvastatin (CRESTOR) 20 mg tablet Take 20 mg by mouth.  fenofibrate (LOFIBRA) 160 mg tablet Take 160 mg by mouth.  gabapentin (NEURONTIN) 300 mg capsule Take 3 Tabs by mouth.  clopidogrel (PLAVIX) 75 mg tab Take 75 mg by mouth.  lidocaine (LIDODERM) 5 %       ONETOUCH ULTRA TEST strip       DULoxetine (CYMBALTA) 60 mg capsule Take 60 mg by mouth daily.       LANTUS 100 unit/mL injection       HUMALOG 100 unit/mL injection       Insulin Syringe-Needle U-100 1 mL 31 x 5/16\" syrg   3    meclizine (ANTIVERT) 25 mg tablet Take 1 Tab by mouth three (3) times daily as needed. 60 Tab 1    TENS Units scott Apply to right lower back for 30 min daily as needed for los back pain 1 Device 2    metoprolol tartrate (LOPRESSOR) 25 mg tablet Take 25 mg by mouth.  potassium chloride SA (MICRO-K) 10 mEq capsule Take 10 mEq by mouth.  pantoprazole (PROTONIX) 40 mg tablet        No current facility-administered medications on file prior to visit. Past Medical History:   Diagnosis Date    [facility] [address]     Fibula fracture     right    Hip fracture (HCC)     Myocardial infarct ([facility] [address] [facility]      Family History   Problem Relation Age of Onset    Cancer Mother     Diabetes Mother     Heart Disease Mother     Heart Disease Father     Diabetes Father      Social History   Substance Use Topics    Smoking status: Former Smoker    Smokeless tobacco: Never Used    Alcohol use No     /70  Pulse 74  Ht 5' 4\" (1.626 m)  Wt 250 lb (113.4 kg)  SpO2 97%  BMI 42.91 kg/m2      Physical Exam   Constitutional: She appears well-developed and well-nourished. No distress. HENT:   Head: Normocephalic and atraumatic. Mouth/Throat: Oropharynx is clear and moist. No oropharyngeal exudate. Eyes: Conjunctivae and EOM are normal. Pupils are equal, round, and reactive to light. No scleral icterus. Neck: Normal range of motion. Neck supple. No thyromegaly present. Cardiovascular: Normal rate, regular rhythm and normal heart sounds. No murmur heard. Musculoskeletal: Normal range of motion. She exhibits edema and tenderness. She exhibits no deformity. She is markedly tender in her feet. Lymphadenopathy:     She has no cervical adenopathy. Neurological:   Beach language and mentation normal cranial nerves II through XII are normal carotid pulses are brisk and no bruits she has hypersensitive upper extremities especially the hands and fingers. She has undergone carpal tunnel repair on both hands.   She does have weakness of her APBs bilaterally. Deep tendon reflexes are trace in her upper extremities that are trace at her knees they are absent at her ankles. She has marked decreased vibratory sensation in her feet. Her gait is abnormal most likely due to decreased sensory input from feet. She is not spastic. Skin: Skin is warm and dry. She is not diaphoretic. Psychiatric: She has a normal mood and affect. Her behavior is normal. Judgment and thought content normal.       ASSESSMENT and PLAN  GAIT DISORDER   has a mild spastic gait disorder undoubtedly due to the cervical myelopathy she suffered. Nothing to be done for that. UE HAND NUMBNESS   I suspect she has recurrent carpal tunnel syndrome in both hands. I will set her up for an EMG of both upper extremities. We will determine whether she has any ulnar neuropathy or median neuropathy or whether this is potentially all cervical neuropathy. LE NUMBNESS  This lady has diabetic neuropathy in her feet to a significant degree. This combined with her cervical myelopathy is leading to considerable problems and use of her legs. All I can do is attempt to treat the pain. I will add an EMG of one lower extremity to the to upper extremities to delineate the degree of neuropathy involved    SPASMS/TREMORS  I suspect these spasms/tremors that she is having are a result of her myelopathy. I am going to try her on some methocarbamol 500 mg 4 times daily as needed, we will see if that makes a significant difference. I will plan to see her back in 3 months and I will see her on the day of her EMG. DIABETES MELLITUS  Stroke Risk, stable, managed by primary care  HYPERLIPIDEMIA  Stroke risk, stable, managed by primary care  This note will not be viewable in [employer].

## 2018-08-27 ENCOUNTER — OFFICE VISIT (OUTPATIENT)
Dept: NEUROLOGY | Age: 53
End: 2018-08-27

## 2018-08-27 VITALS — DIASTOLIC BLOOD PRESSURE: 86 MMHG | OXYGEN SATURATION: 98 % | HEART RATE: 79 BPM | SYSTOLIC BLOOD PRESSURE: 152 MMHG

## 2018-08-27 DIAGNOSIS — M54.12 CERVICAL RADICULOPATHY: ICD-10-CM

## 2018-08-27 DIAGNOSIS — M54.10 RADICULAR NEUROPATHY: Primary | ICD-10-CM

## 2018-08-27 DIAGNOSIS — M54.16 LUMBAR RADICULOPATHY: ICD-10-CM

## 2018-08-27 NOTE — MR AVS SNAPSHOT
[address] [place] [address], 
[facility], [address] [facility] 
[phone] Patient: Adriel Nolan MRN: [id number] :1965 Visit Information Date & Time Provider Department Dept. Phone Encounter #  
 2018  3:00 PM Sole Boyd MD Neurology Clinic at [address] [place] [address] [phone] [id number] Your Appointments 2018  8:20 AM  
Follow Up with Genia Hernández MD  
Neurology Clinic at [address] [place] [address] [address] [facility]) Appt Note: follow up EMG results $ 0 CP 18  
 [address] [place] [address], 
[address] [facility], [address] [address]  
[address] [facility], [address] [facility], [address] [address] Upcoming Health Maintenance Date Due Hepatitis C Screening 1965 DTaP/Tdap/Td series (1 - Tdap) 1986 PAP AKA CERVICAL CYTOLOGY 1986 BREAST CANCER SCRN MAMMOGRAM 2015 FOBT Q 1 YEAR AGE 50-75 2015 Influenza Age 5 to Adult 2018 Allergies as of 2018  Review Complete On: 2018 By: Sole Boyd MD  
  
 Severity Noted Reaction Type Reactions Celexa [Citalopram]  2014    Other (comments) Diflucan [Fluconazole]  2014    Nausea and Vomiting Flagyl [Metronidazole]  2014    Hives Lisinopril  2014    Other (comments) Low BP Metformin  2014    Other (comments) Neosporin [Benzalkonium Chloride]  2014    Other (comments) Pcn [Penicillins]  2014    Hives Sulfa (Sulfonamide Antibiotics)  2014    Hives Current Immunizations  Never Reviewed No immunizations on file. Not reviewed this visit You Were Diagnosed With   
  
 Codes Comments Radicular neuropathy    -  Primary ICD-10-CM: M54.10 ICD-9-CM: 729.2 Vitals BP Pulse SpO2 OB Status Smoking Status  152/86 (BP 1 Location: Right arm, BP Patient Position: Sitting) 79 98% Postmenopausal Former Smoker Preferred Pharmacy Pharmacy Name Phone [address] [phone] Your Updated Medication List  
  
   
This list is accurate as of 8/27/18  4:24 PM.  Always use your most recent med list.  
  
  
  
  
 amitriptyline 10 mg tablet Commonly known as:  ELAVIL  
1 p.o. nightly for 2 weeks then 2 p.o. nightly for 2 weeks then 3 p.o. nightly. Indications: NEUROPATHIC PAIN  
  
 clopidogrel 75 mg Tab Commonly known as:  PLAVIX Take 75 mg by mouth. diclofenac EC 75 mg EC tablet Commonly known as:  VOLTAREN Take 1 Tab by mouth two (2) times daily as needed. DULoxetine 60 mg capsule Commonly known as:  CYMBALTA Take 60 mg by mouth daily. fenofibrate 160 mg tablet Commonly known as:  LOFIBRA Take 160 mg by mouth. fluticasone 50 mcg/actuation inhaler Commonly known as:  FLOVENT DISKUS Take  by inhalation. HumaLOG U-100 Insulin 100 unit/mL injection Generic drug:  insulin lispro Insulin Syringe-Needle U-100 1 mL 31 gauge x 5/16 Syrg LANTUS U-100 INSULIN 100 unit/mL injection Generic drug:  insulin glargine  
  
 lidocaine 5 % Commonly known as:  LIDODERM  
  
 meclizine 25 mg tablet Commonly known as:  ANTIVERT Take 1 Tab by mouth three (3) times daily as needed. metFORMIN 1,000 mg tablet Commonly known as:  GLUCOPHAGE Take 1,000 mg by mouth two (2) times daily (with meals). methocarbamol 500 mg tablet Commonly known as:  ROBAXIN  
1 p.o. every 6 hours as needed spasms  Indications: Muscle Spasm  
  
 metoprolol tartrate 25 mg tablet Commonly known as:  LOPRESSOR Take 25 mg by mouth. NEURONTIN 300 mg capsule Generic drug:  gabapentin Take 3 Tabs by mouth. ONETOUCH ULTRA TEST strip Generic drug:  glucose blood VI test strips  
  
 oxyCODONE-acetaminophen 7.5-325 mg per tablet Commonly known as:  PERCOCET 7.5 Take 1 Tab by mouth every eight (8) hours as needed for Pain. Max Daily Amount: 3 Tabs. pantoprazole 40 mg tablet Commonly known as:  PROTONIX  
  
 potassium chloride SA 10 mEq capsule Commonly known as:  Sharrell Risser Take 10 mEq by mouth. rosuvastatin 20 mg tablet Commonly known as:  CRESTOR Take 20 mg by mouth. TENS Units Davide Apply to right lower back for 30 min daily as needed for los back pain To-Do List   
 09/06/2018 5:30 PM  
  Appointment with DIABETES CLASS #2 MRM at [facility] DIABETIC TREATMENT ([phone])  
  
 09/17/2018 1:00 PM  
  Appointment with DIABETES CLASS #3 MRM at [facility] DIABETIC TREATMENT ([phone]) Introducing [facility] SERVICES! [employer] introduces [employer] patient portal. Now you can access parts of your medical record, email your doctor's office, and request medication refills online. 1. In your internet browser, go to [email or web address] 2. Click on the First Time User? Click Here link in the Sign In box. You will see the New Member Sign Up page. 3. Enter your [employer] Access Code exactly as it appears below. You will not need to use this code after youve completed the sign-up process. If you do not sign up before the expiration date, you must request a new code. · [employer] Access Code: [facility] Expires: 10/8/2018 12:42 PM 
 
4. Enter the last four digits of your Social Security Number (xxxx) and Date of Birth (mm/dd/yyyy) as indicated and click Submit. You will be taken to the next sign-up page. 5. Create a Stack[employer]st ID. This will be your [employer] login ID and cannot be changed, so think of one that is secure and easy to remember. 6. Create a [employer] password. You can change your password at any time. 7. Enter your Password Reset Question and Answer. This can be used at a later time if you forget your password. 8. Enter your e-mail address. You will receive e-mail notification when new information is available in [address] 9. Click Sign Up. You can now view and download portions of your medical record. 10. Click the Download Summary menu link to download a portable copy of your medical information. If you have questions, please visit the Frequently Asked Questions section of the [employer] website. Remember, [employer] is NOT to be used for urgent needs. For medical emergencies, dial 911. Now available from your iPhone and Android! Please provide this summary of care documentation to your next provider. Your primary care clinician is listed as Leilani Adame. If you have any questions after today's visit, please call [phone].

## 2018-08-27 NOTE — PROCEDURES
[facility] Neurology Clinic at [facility]        Tel: [phone] ? Fax: [phone]    ELECTRODIAGNOSTIC REPORT      Test Date:  2018    Patient: Levar Rosario : 1965 Physician: Meryl Damon M.D.   ID#: [id number] SEX: Female Ref. Phys: Jesus Link M.D. Patient History / Exam:  BUEr/o CTS, Cervical radiculopathy, LE r/o neuropathy    EMG & NCV Findings:  Evaluation of the right Fibular motor nerve showed normal distal onset latency (6.3 ms), reduced amplitude (1.0 mV), decreased conduction velocity (B Fib-Ankle, 36 m/s), and decreased conduction velocity (Poplt-B Fib, 40 m/s). The left median motor nerve showed prolonged distal onset latency (5.1 ms), normal amplitude (6.9 mV), and decreased conduction velocity (Elbow-Wrist, 47 m/s). The right median motor nerve showed prolonged distal onset latency (11.2 ms), reduced amplitude (2.9 mV), and decreased conduction velocity (Elbow-Wrist, 38 m/s). The right tibial motor nerve showed normal distal onset latency (4.9 ms), reduced amplitude (1.5 mV), and decreased conduction velocity (Knee-Ankle, 33 m/s). The left ulnar motor nerve showed prolonged distal onset latency (3.5 ms), reduced amplitude (6.7 mV), decreased conduction velocity (B Elbow-Wrist, 46 m/s), and decreased conduction velocity (A Elbow-B Elbow, 48 m/s). The right ulnar motor nerve showed normal distal onset latency (2.7 ms), normal amplitude (8.4 mV), decreased conduction velocity (B Elbow-Wrist, 44 m/s), and decreased conduction velocity (A Elbow-B Elbow, 48 m/s). The left median sensory nerve showed prolonged distal peak latency (4.8 ms) and reduced amplitude (10.5 µV). The right median sensory nerve showed no response (Wrist). The left radial sensory and the right radial sensory nerves showed normal distal peak latency (L2.6, R2.5 ms) and normal amplitude (L11.2, R13.2 µV).   The left Sup Fibular sensory nerve showed normal distal peak latency (3.9 ms), normal amplitude (381.4 µV), and normal conduction velocity (Lower leg-Lat ankle, 111 m/s). The right Sup Fibular sensory nerve showed no response (Lower leg). The right sural sensory nerve showed no response (Calf). The left ulnar sensory and the right ulnar sensory nerves showed normal distal peak latency (L3.5, R2.7 ms) and reduced amplitude (L6.2, R2.3 µV). All left vs. right side differences were within normal limits. F Wave studies indicate that the right tibial F wave has prolonged latency (58.14 ms). All remaining F Wave latencies were within normal limits. Needle evaluation of the right abductor hallucis and the right Fibularis Long muscles showed increased motor unit amplitude and slightly increased polyphasic potentials. The right extensor digitorum brevis muscle showed slightly increased spontaneous activity and slightly increased polyphasic potentials. All remaining muscles (as indicated in the following table) showed no evidence of electrical instability. 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 Right greater than left. Chronic right L5 and S1 radiculopathy. 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    Clinical correlation adivsed          ___________________________  Jose Garcias M.D.    Nerve Conduction Studies  Anti Sensory Summary Table     Stim Site NR Peak (ms) Norm Peak (ms) P-T Amp (µV) Norm P-T Amp Site1 Site2 Dist (cm)   Left Median Anti Sensory (2nd Digit)  34.4°C   Wrist    4.8 <4 10.5 >13 Wrist 2nd Digit 14.0   Right Median Anti Sensory (2nd Digit)  34.3°C   Wrist NR  <4  >13 Wrist 2nd Digit 14.0   Left Radial Anti Sensory (Base 1st Digit)  34.5°C   Wrist    2.6 <2.8 11.2 >11 Wrist Base 1st Digit 10.0   Right Radial Anti Sensory (Base 1st Digit)  34°C   Wrist    2.5 <2.8 13.2 >11 Wrist Base 1st Digit 10.0   Left Sup Fibular Anti Sensory (Lat ankle)   Lower leg    3.9 <4.5 381.4 >5 Lower leg Lat ankle 10.0   Right Sup Fibular Anti Sensory (Lat ankle)  31.1°C   Lower leg NR  <4.5  >5 Lower leg Lat ankle 10.0   Right Sural Anti Sensory (Lat Mall)  31°C   Calf NR  <4.5  >4.0 Calf Lat Mall 14.0   Left Ulnar Anti Sensory (5th Digit)  34.4°C   Wrist    3.5 <4.0 6.2 >9 Wrist 5th Digit 14.0   Right Ulnar Anti Sensory (5th Digit)  34.3°C   Wrist    2.7 <4.0 2.3 >9 Wrist 5th Digit 14.0     Motor Summary Table     Stim Site NR Onset (ms) Norm Onset (ms) O-P Amp (mV) Norm O-P Amp P-T Amp (mV) Site1 Site2 Dist (cm) Joe (m/s)   Right Fibular Motor (Ext Dig Brev)  30.8°C   Ankle    6.3 <6.5 1.0 >2.6 1.3 Ankle Ext Dig Brev 8.0    B Fib    13.8  0.8  1.0 B Fib Ankle 27.0 36   Poplt    16.3  0.6  0.9 Poplt B Fib 10.0 40   Left Median Motor (Abd Poll Brev)  32.8°C    CTS & Ulnar surgery 11/17   Wrist    5.1 <4.5 6.9 >4.1 11.0 Wrist Abd Poll Brev 8.0    Elbow    9.8  6.3  9.6 Elbow Wrist 22.0 47   Right Median Motor (Abd Poll Brev)  31.8°C   Wrist    11.2 <4.5 2.9 >4.1 4.6 Wrist Abd Poll Brev 8.0    Elbow    17.0  2.6  4.2 Elbow Wrist 22.0 38   Right Tibial Motor (Abd Johansen Brev)  31°C   Ankle    4.9 <6.1 1.5 >5.3 2.2 Ankle Abd Johansen Brev 8.0    Knee    16.4  1.5  2.3 Knee Ankle 38.0 33   Left Ulnar Motor (Abd Dig Minimi)  34.3°C    ulnar nerve transposed   Wrist    3.5 <3.1 6.7 >7.0 10.1 Wrist Abd Dig Minimi 8.0    B Elbow    7.0  6.6  10.0 B Elbow Wrist 16.0 46   A Elbow    9.1  5.8  8.9 A Elbow B Elbow 10.0 48   Right Ulnar Motor (Abd Dig Minimi)  33.3°C   Wrist    2.7 <3.1 8.4 >7.0 13.7 Wrist Abd Dig Minimi 8.0    B Elbow    6.8  8.3  13.7 B Elbow Wrist 18.0 44   A Elbow    8.9  7.9  12.9 A Elbow B Elbow 10.0 48     F Wave Studies     NR F-Lat (ms) Lat Norm (ms) L-R F-Lat (ms) L-R Lat Norm   Right Tibial (Mrkrs) (Abd Hallucis)  31.2°C      58.14 <56  <5.7   Right Ulnar (Mrkrs) (Abd Dig Min)  33.6°C      31.72 <32  <2.5       EMG     Side Muscle Nerve Root Ins Act Fibs Psw Recrt Duration Amp Poly Comment   Right 1stDorInt Ulnar C8-T1 Nml Nml Nml Nml Nml Nml Nml    Right Abd Poll Brev Median C8-T1 Nml Nml Nml Nml Nml Nml Nml    Right PronatorTeres Median C6-7 Nml Nml Nml Nml Nml Nml Nml    Right Biceps Musculocut C5-6 Nml Nml Nml Nml Nml Nml Nml    Right Triceps Radial C6-7-8 Nml Nml Nml Nml Nml Nml Nml    Right Deltoid Axillary C5-6 Nml Nml Nml Nml Nml Nml Nml    Right ABD Dig Min Ulnar C8-T1 Nml Nml Nml Nml Nml Nml Nml    Right AbdHallucis MedPlantar S1-2 Nml Nml Nml Nml Nml Incr 1+    Right Ext Dig Brev Dp Br Peron L5, S1 Nml 1+ Nml Nml Nml Nml 1+    Right Fibularis Long Sup Br Peron L5-S1 Nml Nml Nml Nml Nml Incr 1+      Waveforms:

## 2018-08-29 ENCOUNTER — TELEPHONE (OUTPATIENT)
Dept: FAMILY MEDICINE CLINIC | Age: 53
End: 2018-08-29

## 2018-08-29 NOTE — TELEPHONE ENCOUNTER
She is having headaches and concerned about BP. She also said she is having some mild chest pain that she knows is not heart related.  Apt set up for tomorrow

## 2018-08-30 ENCOUNTER — OFFICE VISIT (OUTPATIENT)
Dept: FAMILY MEDICINE CLINIC | Age: 53
End: 2018-08-30

## 2018-08-30 VITALS
OXYGEN SATURATION: 95 % | HEART RATE: 97 BPM | SYSTOLIC BLOOD PRESSURE: 117 MMHG | BODY MASS INDEX: 42.17 KG/M2 | HEIGHT: 64 IN | WEIGHT: 247 LBS | DIASTOLIC BLOOD PRESSURE: 78 MMHG | TEMPERATURE: 98.4 F | RESPIRATION RATE: 12 BRPM

## 2018-08-30 DIAGNOSIS — I10 ESSENTIAL HYPERTENSION: ICD-10-CM

## 2018-08-30 DIAGNOSIS — R07.89 CHEST WALL PAIN: Primary | ICD-10-CM

## 2018-08-30 RX ORDER — DICLOFENAC SODIUM 10 MG/G
GEL TOPICAL 4 TIMES DAILY
Qty: 30 G | Refills: 0 | Status: SHIPPED | OUTPATIENT
Start: 2018-08-30 | End: 2018-09-14 | Stop reason: SDUPTHER

## 2018-08-30 RX ORDER — ACETAMINOPHEN 500 MG
TABLET ORAL
Qty: 1 KIT | Refills: 0 | Status: SHIPPED | OUTPATIENT
Start: 2018-08-30 | End: 2020-11-05

## 2018-08-30 NOTE — PROGRESS NOTES
Subjective:     Susi Stovall is a 48 y.o. female who presents today with the following:  Chief Complaint   Patient presents with    Headache    Blood Pressure Check     HTN  Susi Stovall is a 48 y.o. female with hypertension. Hypertension ROS: taking medications as instructed, no medication side effects noted, no TIA's, no chest pain on exertion, no dyspnea on exertion, no swelling of ankles. New concerns: bp running high at doctors visits, running 130-150s/80s. Is worried that her bp is running high on a regular basis, and are accompanied by headaches. HA is more of an annoyance, located in middle of HA, no radiation. Denies blurry vision. Has had HA before, but this is no where near like this. Patient has taken Percocet for chest wall pain in the past.      Chest wall pain   Patient with history of CABG in Feb 2017; has chronic chest wall pain that gets worse from time to time. Pain is managed by   Had appointment with CT surgeon yesterday, Dr. Sasha Mcginnis but iwas cancelled due to drs surgery. ROS:  Gen: denies fever, chills, fatigue, weight loss, weight gain  HEENT:denies blurry vision, nasal congestion, sore throat  Resp: denies dypsnea, cough, wheezing  CV: denies chest pain, palpitations, lower extremity edema  Abd: denies nausea, vomiting, diarrhea, constipation  Neuro: denies numbness/tingling  Endo: denies polyuria, polydipsia, heat/cold intolerance  Heme: no lymphadenopathy    Allergies   Allergen Reactions    Celexa [Citalopram] Other (comments)    Diflucan [Fluconazole] Nausea and Vomiting    Flagyl [Metronidazole] Hives    Lisinopril Other (comments)     Low BP    Metformin Other (comments)    Neosporin [Benzalkonium Chloride] Other (comments)    Pcn [Penicillins] Hives    Sulfa (Sulfonamide Antibiotics) Hives         Current Outpatient Prescriptions:     diclofenac (VOLTAREN) 1 % gel, Apply  to affected area four (4) times daily. , Disp: 30 g, Rfl: 0    Blood Pressure Monitor (BLOOD PRESSURE KIT) kit, Check blood pressure daily, Disp: 1 Kit, Rfl: 0    fluticasone (FLOVENT DISKUS) 50 mcg/actuation inhaler, Take  by inhalation. , Disp: , Rfl:     metFORMIN (GLUCOPHAGE) 1,000 mg tablet, Take 1,000 mg by mouth two (2) times daily (with meals). , Disp: , Rfl:     diclofenac EC (VOLTAREN) 75 mg EC tablet, Take 1 Tab by mouth two (2) times daily as needed. , Disp: 30 Tab, Rfl: 2    TENS Units scott, Apply to right lower back for 30 min daily as needed for los back pain, Disp: 1 Device, Rfl: 2    oxyCODONE-acetaminophen (PERCOCET 7.5) 7.5-325 mg per tablet, Take 1 Tab by mouth every eight (8) hours as needed for Pain. Max Daily Amount: 3 Tabs., Disp: 20 Tab, Rfl: 0    rosuvastatin (CRESTOR) 20 mg tablet, Take 20 mg by mouth., Disp: , Rfl:     fenofibrate (LOFIBRA) 160 mg tablet, Take 160 mg by mouth., Disp: , Rfl:     metoprolol tartrate (LOPRESSOR) 25 mg tablet, Take 25 mg by mouth., Disp: , Rfl:     gabapentin (NEURONTIN) 300 mg capsule, Take 3 Tabs by mouth., Disp: , Rfl:     clopidogrel (PLAVIX) 75 mg tab, Take 75 mg by mouth., Disp: , Rfl:     potassium chloride SA (MICRO-K) 10 mEq capsule, Take 10 mEq by mouth., Disp: , Rfl:     lidocaine (LIDODERM) 5 %, , Disp: , Rfl:     ONETOUCH ULTRA TEST strip, , Disp: , Rfl:     DULoxetine (CYMBALTA) 60 mg capsule, Take 60 mg by mouth daily. , Disp: , Rfl:     LANTUS 100 unit/mL injection, , Disp: , Rfl:     HUMALOG 100 unit/mL injection, , Disp: , Rfl:     pantoprazole (PROTONIX) 40 mg tablet, , Disp: , Rfl:     Insulin Syringe-Needle U-100 1 mL 31 x 5/16\" syrg, , Disp: , Rfl: 3    amitriptyline (ELAVIL) 10 mg tablet, 1 p.o. nightly for 2 weeks then 2 p.o. nightly for 2 weeks then 3 p.o. nightly.   Indications: NEUROPATHIC PAIN, Disp: 90 Tab, Rfl: 5    methocarbamol (ROBAXIN) 500 mg tablet, 1 p.o. every 6 hours as needed spasms  Indications: Muscle Spasm, Disp: 120 Tab, Rfl: 3    meclizine (ANTIVERT) 25 mg tablet, Take 1 Tab by mouth three (3) times daily as needed. , Disp: 61 Tab, Rfl: 1    Past Medical History:   Diagnosis Date    Diabetes ([facility] [address]     Fibula fracture     right    Hip fracture (HCC)     Myocardial infarct [facility])        Past Surgical History:   Procedure Laterality Date    HX CERVICAL LAMINECTOMY      HX CORONARY ARTERY BYPASS GRAFT      February 2017    HX GYN      HX ORTHOPAEDIC      cervical fusion       History   Smoking Status    Former Smoker   Smokeless Tobacco    Never Used       Social History     Social History    Marital status:      Spouse name: N/A    Number of children: N/A    Years of education: N/A     Social History Main Topics    Smoking status: Former Smoker    Smokeless tobacco: Never Used    Alcohol use No    Drug use: Yes     Special: Cocaine    Sexual activity: No     Other Topics Concern    None     Social History Narrative       Family History   Problem Relation Age of Onset    Cancer Mother     Diabetes Mother     Heart Disease Mother     Heart Disease Father     Diabetes Father          Objective:     Visit Vitals    /78 (BP 1 Location: Right arm, BP Patient Position: Sitting)    Pulse 97    Temp 98.4 °F (36.9 °C)    Resp 12    Ht 5' 4\" (1.626 m)    Wt 247 lb (112 kg)    SpO2 95%    BMI 42.4 kg/m2     Gen: alert, oriented, no acute distress  Head: normocephalic, atraumatic  Eyes:sclera clear, conjunctiva clear  Oral: moist mucus membranes, no oral lesions, no pharyngeal exudate, no pharyngeal erythema  Resp: Normal work of breathing, lungs CTAB, no w/r/r  CV: S1, S2 normal.  No murmurs, rubs, or gallops. Abd:  Normal bowel sounds. Soft, not tender, not distended. Skin: chest wall with midline sternotomy scar. Tender to palpation. L             Extremities: no edema    EKG: inverted T waves in multiple leads, L anterior fasicular block. EKG unchanged from last ekg done on 8/22/18. Assessment/ Plan:   Diagnoses and all orders for this visit:    1. Chest wall pain  -     AMB POC EKG ROUTINE W/ 12 LEADS, INTER & REP  -     diclofenac (VOLTAREN) 1 % gel; Apply  to affected area four (4) times daily.    -ekg unchanged. Likely MSK/secondary to poorly healed sternotomy s/p CABG. Pt has follow up with CT surgery in a few weeks, will discuss revision with them. In the mean time will try to get voltaren gel approved to help with pain. Pt states she has been taking percocet for the pain but states this does not help and makes her tired. 2. Essential hypertension  -     Blood Pressure Monitor (BLOOD PRESSURE KIT) kit; Check blood pressure daily  -BP at goal.        Verbal and written instructions (see AVS) provided.  Patient expresses understanding of diagnosis and treatment plan. Mikie Guzman.  Vilma Joe MD

## 2018-09-06 ENCOUNTER — HOSPITAL ENCOUNTER (OUTPATIENT)
Dept: DIABETES SERVICES | Age: 53
Discharge: HOME | End: 2018-09-06
Attending: FAMILY MEDICINE
Payer: COMMERCIAL

## 2018-09-06 ENCOUNTER — TELEPHONE (OUTPATIENT)
Dept: FAMILY MEDICINE CLINIC | Age: 53
End: 2018-09-06

## 2018-09-06 DIAGNOSIS — E11.9 TYPE 2 DIABETES MELLITUS WITHOUT COMPLICATION, UNSPECIFIED WHETHER LONG TERM INSULIN USE (HCC): ICD-10-CM

## 2018-09-06 DIAGNOSIS — I95.2 HYPOTENSION DUE TO DRUGS: ICD-10-CM

## 2018-09-06 DIAGNOSIS — I25.10 CORONARY ARTERY DISEASE INVOLVING NATIVE HEART WITHOUT ANGINA PECTORIS, UNSPECIFIED VESSEL OR LESION TYPE: Primary | ICD-10-CM

## 2018-09-06 DIAGNOSIS — M54.9 BACK PAIN, UNSPECIFIED BACK LOCATION, UNSPECIFIED BACK PAIN LATERALITY, UNSPECIFIED CHRONICITY: ICD-10-CM

## 2018-09-06 PROCEDURE — G0109 DIAB MANAGE TRN IND/GROUP: HCPCS | Performed by: DIETITIAN, REGISTERED

## 2018-09-06 NOTE — LETTER
9/7/2018 8:17 AM 
 
Ms. Tomas Stovall 
[address] [address] To Whom It May Concern: 
 
Real Stovall is currently under the care of [address] [place] [address]. Patient has chronic right-sided low back pain. She has found TENS unit applied for 30 minutes helpful in pain reduction for several hours. This has improved her function and reduced her need for medications to treat low back pain If there are questions or concerns please have the patient contact our office. Sincerely, Osei Denise MD

## 2018-09-06 NOTE — TELEPHONE ENCOUNTER
Patient needs prescription for TENS unit and letter stating why she needs it faxed to [address] [place] [address]. Patient separate RX for BP cuff and letter. Please faxed to [phone].

## 2018-09-06 NOTE — LETTER
NOTIFICATION RETURN TO WORK / SCHOOL 
 
9/7/2018 8:23 AM 
 
Ms. Tomas Stovall 
[address] [address] To Whom It May Concern: 
 
Jane January Day is currently under the care of [address] [place] [address]. Patient has extensive coronary artery disease and requires several medications to protect her heart that also reduce her blood pressure. As a result her blood pressure is low, sometimes to the point where she has symptoms of hypotension. Her blood pressure requires regular monitoring for medication adjustment. She would benefit from a home blood pressure cuff If there are questions or concerns please have the patient contact our office. Sincerely, Clovis Jean MD

## 2018-09-07 NOTE — DIABETES MGMT
9/6/2018 Dear Kellie Sanchez MD, Thank you for your kind referral. Your patient, Azra Stovall, attended Session #2 at [place] [address] where the following topics were covered today: 
* Incorporating physical activity into lifestyle * Incorporating nutrition management into lifestyle * Preventing, detecting and treating acute complications * Preventing, detecting and treating chronic complications Your patient will have one (1) additional appointments to complete the ordered education. Their next visit is scheduled for 9/17/18. We look forward to assisting your patient in meeting their self-management goals. If you have any questions, please do not hesitate to call the [facility] at [phone] Sincerely, Sari Pickett [address] [place] [address] [address] [facility] [address], [address] [place] Phone: [phone] Fax: [phone]

## 2018-09-07 NOTE — TELEPHONE ENCOUNTER
Wrote up 2 letters and prescriptions. Faxed to the medical device company as requested and they faxed back stating that they only process these requests and person.   Evidently patient is to  letter and prescription and deliver it to the device company

## 2018-09-10 RX ORDER — ROSUVASTATIN CALCIUM 20 MG/1
20 TABLET, COATED ORAL
Qty: 90 TAB | Refills: 3 | Status: SHIPPED | OUTPATIENT
Start: 2018-09-10 | End: 2020-11-05 | Stop reason: SINTOL

## 2018-09-10 RX ORDER — CLOPIDOGREL BISULFATE 75 MG/1
75 TABLET ORAL DAILY
Qty: 90 TAB | Refills: 3 | Status: SHIPPED | OUTPATIENT
Start: 2018-09-10 | End: 2019-04-22 | Stop reason: SDUPTHER

## 2018-09-14 ENCOUNTER — OFFICE VISIT (OUTPATIENT)
Dept: FAMILY MEDICINE CLINIC | Age: 53
End: 2018-09-14

## 2018-09-14 ENCOUNTER — TELEPHONE (OUTPATIENT)
Dept: FAMILY MEDICINE CLINIC | Age: 53
End: 2018-09-14

## 2018-09-14 VITALS
HEART RATE: 92 BPM | HEIGHT: 64 IN | SYSTOLIC BLOOD PRESSURE: 138 MMHG | TEMPERATURE: 98.6 F | RESPIRATION RATE: 16 BRPM | OXYGEN SATURATION: 96 % | WEIGHT: 251 LBS | BODY MASS INDEX: 42.85 KG/M2 | DIASTOLIC BLOOD PRESSURE: 98 MMHG

## 2018-09-14 DIAGNOSIS — M96.89 NONUNION OF STERNUM AFTER STERNOTOMY: ICD-10-CM

## 2018-09-14 DIAGNOSIS — Z79.4 TYPE 2 DIABETES MELLITUS WITH COMPLICATION, WITH LONG-TERM CURRENT USE OF INSULIN (HCC): ICD-10-CM

## 2018-09-14 DIAGNOSIS — I25.10 CORONARY ARTERY DISEASE INVOLVING NATIVE HEART WITHOUT ANGINA PECTORIS, UNSPECIFIED VESSEL OR LESION TYPE: ICD-10-CM

## 2018-09-14 DIAGNOSIS — R07.89 CHEST WALL PAIN: Primary | ICD-10-CM

## 2018-09-14 DIAGNOSIS — Z95.1 S/P CABG X 3: ICD-10-CM

## 2018-09-14 DIAGNOSIS — I95.9 HYPOTENSION, UNSPECIFIED HYPOTENSION TYPE: ICD-10-CM

## 2018-09-14 DIAGNOSIS — I10 ESSENTIAL HYPERTENSION: ICD-10-CM

## 2018-09-14 DIAGNOSIS — M54.9 BACK PAIN, UNSPECIFIED BACK LOCATION, UNSPECIFIED BACK PAIN LATERALITY, UNSPECIFIED CHRONICITY: ICD-10-CM

## 2018-09-14 DIAGNOSIS — E11.8 TYPE 2 DIABETES MELLITUS WITH COMPLICATION, WITH LONG-TERM CURRENT USE OF INSULIN (HCC): ICD-10-CM

## 2018-09-14 DIAGNOSIS — E78.1 HYPERTRIGLYCERIDEMIA: ICD-10-CM

## 2018-09-14 RX ORDER — LIDOCAINE 50 MG/G
1 PATCH TOPICAL EVERY 12 HOURS
Qty: 1 EACH | Refills: 2 | Status: SHIPPED | OUTPATIENT
Start: 2018-09-14 | End: 2019-03-28 | Stop reason: SDUPTHER

## 2018-09-14 RX ORDER — DICLOFENAC SODIUM 10 MG/G
GEL TOPICAL 4 TIMES DAILY
Qty: 30 G | Refills: 2 | Status: SHIPPED | OUTPATIENT
Start: 2018-09-14 | End: 2019-04-22 | Stop reason: SDUPTHER

## 2018-09-14 NOTE — PROGRESS NOTES
. 
Chief Complaint Patient presents with  Muscle Pain  Eye Drainage  Medication Evaluation Dior Dixon

## 2018-09-14 NOTE — MR AVS SNAPSHOT
[address] [facility] 
 
 
 [address] 
[phone] Patient: Zak Del Rio MRN: [id number] :1965 Visit Information Date & Time Provider Department Dept. Phone Encounter #  
 2018 10:00 AM Nayeli Holland MD [address] [facility] [phone] [id number] Your Appointments 2018  8:20 AM  
Follow Up with Idalia Dominguez MD  
[facility] [address]) Appt Note: follow up EMG results $ 0 CP 18  
 [address] [facility], 
[address], [address] [facility] [address]  
[address] [facility], [address], [address] [place] [address] Upcoming Health Maintenance Date Due Hepatitis C Screening 1965 HEMOGLOBIN A1C Q6M 1965 FOOT EXAM Q1 1975 EYE EXAM RETINAL OR DILATED Q1 1975 Pneumococcal 19-64 Medium Risk (1 of 1 - PPSV23) 1984 DTaP/Tdap/Td series (1 - Tdap) 1986 PAP AKA CERVICAL CYTOLOGY 1986 BREAST CANCER SCRN MAMMOGRAM 2015 FOBT Q 1 YEAR AGE 50-75 2015 Influenza Age 5 to Adult 2018 MICROALBUMIN Q1 7/10/2019 LIPID PANEL Q1 2019 Allergies as of 2018  Review Complete On: 2018 By: Rubia Johnson Severity Noted Reaction Type Reactions Celexa [Citalopram]  2014    Other (comments) Diflucan [Fluconazole]  2014    Nausea and Vomiting Flagyl [Metronidazole]  2014    Hives Lisinopril  2014    Other (comments) Low BP Metformin  2014    Other (comments) Neosporin [Benzalkonium Chloride]  2014    Other (comments) Pcn [Penicillins]  2014    Hives Sulfa (Sulfonamide Antibiotics)  2014    Hives Current Immunizations  Never Reviewed No immunizations on file. Not reviewed this visit You Were Diagnosed With   
  
 Codes Comments Chest wall pain     ICD-10-CM: R07.89 ICD-9-CM: 786.52 Vitals BP Pulse Temp Resp Height(growth percentile) Weight(growth percentile) (!) 138/98 (BP 1 Location: Left arm, BP Patient Position: Sitting) 92 98.6 °F (37 °C) 16 5' 4\" (1.626 m) 251 lb (113.9 kg) SpO2 BMI OB Status Smoking Status 96% 43.08 kg/m2 Postmenopausal Former Smoker Vitals History BMI and BSA Data Body Mass Index Body Surface Area 43.08 kg/m 2 2.27 m 2 Preferred Pharmacy Pharmacy Name Phone [facility], [address] [facility] ASHLEY Trejo [facility] [phone] Your Updated Medication List  
  
   
This list is accurate as of 9/14/18 10:46 AM.  Always use your most recent med list.  
  
  
  
  
 amitriptyline 10 mg tablet Commonly known as:  ELAVIL  
1 p.o. nightly for 2 weeks then 2 p.o. nightly for 2 weeks then 3 p.o. nightly. Indications: NEUROPATHIC PAIN Blood Pressure Monitor Kit Commonly known as:  BLOOD PRESSURE KIT Check blood pressure daily  
  
 clopidogrel 75 mg Tab Commonly known as:  PLAVIX Take 1 Tab by mouth daily. * diclofenac EC 75 mg EC tablet Commonly known as:  VOLTAREN Take 1 Tab by mouth two (2) times daily as needed. * diclofenac 1 % Gel Commonly known as:  VOLTAREN Apply  to affected area four (4) times daily. DULoxetine 60 mg capsule Commonly known as:  CYMBALTA Take 60 mg by mouth daily. fenofibrate 160 mg tablet Commonly known as:  LOFIBRA Take 160 mg by mouth. fluticasone 50 mcg/actuation inhaler Commonly known as:  FLOVENT DISKUS Take  by inhalation. HumaLOG U-100 Insulin 100 unit/mL injection Generic drug:  insulin lispro Insulin Syringe-Needle U-100 1 mL 31 gauge x 5/16 Syrg LANTUS U-100 INSULIN 100 unit/mL injection Generic drug:  insulin glargine  
  
 lidocaine 5 % Commonly known as:  Melvin Montenegrin  
 1 Patch by TransDERmal route every twelve (12) hours. meclizine 25 mg tablet Commonly known as:  ANTIVERT Take 1 Tab by mouth three (3) times daily as needed. metFORMIN 1,000 mg tablet Commonly known as:  GLUCOPHAGE Take 1,000 mg by mouth two (2) times daily (with meals). methocarbamol 500 mg tablet Commonly known as:  ROBAXIN  
1 p.o. every 6 hours as needed spasms  Indications: Muscle Spasm  
  
 metoprolol tartrate 25 mg tablet Commonly known as:  LOPRESSOR Take 25 mg by mouth.  
  
 miscellaneous medical supply Misc Commonly known as:  BLOOD PRESSURE CUFF Check blood pressure daily NEURONTIN 300 mg capsule Generic drug:  gabapentin Take 3 Tabs by mouth. ONETOUCH ULTRA TEST strip Generic drug:  glucose blood VI test strips  
  
 oxyCODONE-acetaminophen 7.5-325 mg per tablet Commonly known as:  PERCOCET 7.5 Take 1 Tab by mouth every eight (8) hours as needed for Pain. Max Daily Amount: 3 Tabs. pantoprazole 40 mg tablet Commonly known as:  PROTONIX  
  
 potassium chloride SA 10 mEq capsule Commonly known as:  [place] Take 10 mEq by mouth. rosuvastatin 20 mg tablet Commonly known as:  CRESTOR Take 1 Tab by mouth nightly. TENS Units [place] Apply to right lower back for 30 min daily as needed for low back pain * Notice: This list has 2 medication(s) that are the same as other medications prescribed for you. Read the directions carefully, and ask your doctor or other care provider to review them with you. Prescriptions Sent to Pharmacy Refills  
 diclofenac (VOLTAREN) 1 % gel 2 Sig: Apply  to affected area four (4) times daily. Class: Normal  
 Pharmacy: [facility] [address], [address] [place] [address] Ph #: [phone] Route: Topical  
 lidocaine (LIDODERM) 5 % 2 Si Patch by TransDERmal route every twelve (12) hours.   
 Class: Normal  
 Pharmacy: [facility] [address], [address] #: [phone] Route: TransDERmal  
  
To-Do List   
 09/17/2018 1:00 PM  
  Appointment with DIABETES CLASS #3 [facility] ([phone]) Introducing [facility] SERVICES! [address] introduces [employer] patient portal. Now you can access parts of your medical record, email your doctor's office, and request medication refills online. 1. In your internet browser, go to https://[employer]. [employer] 2. Click on the First Time User? Click Here link in the Sign In box. You will see the New Member Sign Up page. 3. Enter your Vungle Access Code exactly as it appears below. You will not need to use this code after youve completed the sign-up process. If you do not sign up before the expiration date, you must request a new code. · Vungle Access Code: [facility] Expires: 10/8/2018 12:42 PM 
 
4. Enter the last four digits of your Social Security Number (xxxx) and Date of Birth (mm/dd/yyyy) as indicated and click Submit. You will be taken to the next sign-up page. 5. Create a Vungle ID. This will be your Vungle login ID and cannot be changed, so think of one that is secure and easy to remember. 6. Create a Vungle password. You can change your password at any time. 7. Enter your Password Reset Question and Answer. This can be used at a later time if you forget your password. 8. Enter your e-mail address. You will receive e-mail notification when new information is available in [address] 9. Click Sign Up. You can now view and download portions of your medical record. 10. Click the Download Summary menu link to download a portable copy of your medical information. If you have questions, please visit the Frequently Asked Questions section of the Vungle website. Remember, [employer]ungle is NOT to be used for urgent needs. For medical emergencies, dial 911. Now available from your iPhone and Android! Please provide this summary of care documentation to your next provider. Your primary care clinician is listed as David Ptael. If you have any questions after today's visit, please call [phone].

## 2018-09-14 NOTE — PROGRESS NOTES
PARIS Corley Steffen Stovall is a 48 y.o. female who presents to follow-up on recent changes in her chronic conditions. Since I seen her last she has seen neurology and had EMG. 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 She was started on Elavil and Robaxin but she has not started taking these medications yet. Evidently wanted my blessing before starting these. Go to it Saw her cardiothoracic surgeon, they would like to proceed with plates and screws on her sternotomy in 1 mo. sternotomy pain is improving with Voltaren gel Is going to diabetic education classes and learning a bit. Is checking her sugar many times per day. Typically 160s fasting. Dinner [employer] Goal is to have an A1c 8.9% or less and have lost  10 pounds by then. Is her worst meal.  170 before dinner and 200300 after dinner. This is despite 30 units mealtime Humalog. Is seeing endocrinology next week. Briefly talked about her diet which consists of a lot of cheap carbs. She complains that financially this is their best option. Does not sound like she is doing the cooking Was having hypotension, dizziness when I saw her last.  Metoprolol was a likely culprit but I asked her to continue this for the cardioprotective effect. She stopped it on her own and says that she is no longer having any dizziness. Please bring this up with her cardiologist next week PMHx: 
Past Medical History:  
Diagnosis Date  Diabetes ([facility]  Fibula fracture   
 right  Hip fracture ([facility]  Myocardial infarct ([facility] Meds:  
Current Outpatient Prescriptions Medication Sig Dispense Refill  diclofenac (VOLTAREN) 1 % gel Apply  to affected area four (4) times daily. 30 g 2  
 lidocaine (LIDODERM) 5 % 1 Patch by TransDERmal route every twelve (12) hours. 1 Each 2  
 rosuvastatin (CRESTOR) 20 mg tablet Take 1 Tab by mouth nightly.  90 Tab 3  
  clopidogrel (PLAVIX) 75 mg tab Take 1 Tab by mouth daily. 90 Tab 3  
 TENS Units [place]ott Apply to right lower back for 30 min daily as needed for low back pain 1 Device 2  
 miscellaneous medical supply (BLOOD PRESSURE CUFF) misc Check blood pressure daily 1 Each 0  Blood Pressure Monitor (BLOOD PRESSURE KIT) kit Check blood pressure daily 1 Kit 0  
 fluticasone (FLOVENT DISKUS) 50 mcg/actuation inhaler Take  by inhalation.  amitriptyline (ELAVIL) 10 mg tablet 1 p.o. nightly for 2 weeks then 2 p.o. nightly for 2 weeks then 3 p.o. nightly. Indications: NEUROPATHIC PAIN 90 Tab 5  
 methocarbamol (ROBAXIN) 500 mg tablet 1 p.o. every 6 hours as needed spasms  Indications: Muscle Spasm 120 Tab 3  
 meclizine (ANTIVERT) 25 mg tablet Take 1 Tab by mouth three (3) times daily as needed. 60 Tab 1  
 metFORMIN (GLUCOPHAGE) 1,000 mg tablet Take 1,000 mg by mouth two (2) times daily (with meals).  diclofenac EC (VOLTAREN) 75 mg EC tablet Take 1 Tab by mouth two (2) times daily as needed. 30 Tab 2  
 oxyCODONE-acetaminophen (PERCOCET 7.5) 7.5-325 mg per tablet Take 1 Tab by mouth every eight (8) hours as needed for Pain. Max Daily Amount: 3 Tabs. 20 Tab 0  
 fenofibrate (LOFIBRA) 160 mg tablet Take 160 mg by mouth.  metoprolol tartrate (LOPRESSOR) 25 mg tablet Take 25 mg by mouth.  gabapentin (NEURONTIN) 300 mg capsule Take 3 Tabs by mouth.  potassium chloride SA (MICRO-K) 10 mEq capsule Take 10 mEq by mouth.  ONETOUCH ULTRA TEST strip  DULoxetine (CYMBALTA) 60 mg capsule Take 60 mg by mouth daily.  LANTUS 100 unit/mL injection  HUMALOG 100 unit/mL injection  pantoprazole (PROTONIX) 40 mg tablet  Insulin Syringe-Needle U-100 1 mL 31 x 5/16\" syrg   3 Allergies: Allergies Allergen Reactions  Celexa [Citalopram] Other (comments)  Diflucan [Fluconazole] Nausea and Vomiting  Flagyl [Metronidazole] Hives  Lisinopril Other (comments) Low BP  
 Metformin Other (comments)  Neosporin [Benzalkonium Chloride] Other (comments)  Pcn [Penicillins] Hives  Sulfa (Sulfonamide Antibiotics) Hives Smoker: 
History Smoking Status  Former Smoker Smokeless Tobacco  
 Never Used ETOH:  
History Alcohol Use No  
 
 
FH:  
Family History Problem Relation Age of Onset  Cancer Mother  Diabetes Mother  Heart Disease Mother  Heart Disease Father  Diabetes Father ROS:  
As listed in HPI. In addition: 
Constitutional:   No headache, fever, fatigue, weight loss or weight gain Cardiac:    No chest pain Resp:   No cough or shortness of breath Neuro   No loss of consciousness, dizziness, seizures Physical Exam: 
Blood pressure (!) 138/98, pulse 92, temperature 98.6 °F (37 °C), resp. rate 16, height 5' 4\" (1.626 m), weight 251 lb (113.9 kg), SpO2 96 %. GEN: No apparent distress. Alert and oriented and responds to all questions appropriately. NEUROLOGIC:  No focal neurologic deficits. Strength and sensation grossly intact. Coordination and gait grossly intact. EXT: Well perfused. No edema. SKIN: No obvious rashes. Lungs clear to auscultation bilaterally CV regular rate and rhythm no murmur HEENT clear tympanic membrane, mildly congested nasal mucosa, no postnasal drip. No obvious lymphadenopathy Assessment and Plan Chest wall pain 
Cardiothoracic surgeon hope to proceed with sternotomy repair in 1 month if her blood sugar and weight are looking better. She is finding benefit from Voltaren gel. Very occasionally using Percocet. Has some pills left and does not need a refill. Very occasionally using Voltaren pill. Asked her to use this preferentially over the Percocet. Neuropathy, lower extremity spasticity Neurology suggested Robaxin as needed and Elavil ramp-up at night. She has not started this which is unfortunate because this will probably help. Please start these medications CAD No longer taking metoprolol. Please bring this up with her cardiologist.  She stopped it because of apparent side effect of hypotension Hypertriglyceridemia Max dose of fenofibrate Might need to increase to Crestor 40 mg. No change at the moment Diabetes Counseled extensively on diet. Needs a lot of work and is learning a lot from diabetes education. They will be going over reading labels next week. Put a pitch in for frozen vegetables ICD-10-CM ICD-9-CM 1. Chest wall pain R07.89 786.52 diclofenac (VOLTAREN) 1 % gel 2. Hypotension, unspecified hypotension type I95.9 458.9 3. Essential hypertension I10 401.9 4. Hypertriglyceridemia E78.1 272.1 5. S/P CABG x 3 Z95.1 V45.81   
6. Back pain, unspecified back location, unspecified back pain laterality, unspecified chronicity M54.9 724.5 7. Coronary artery disease involving native heart without angina pectoris, unspecified vessel or lesion type I25.10 414.01   
8. 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   
9. Type 2 diabetes mellitus with complication, with long-term current use of insulin ([facility]) E11.8 250.90   
 Z79.4 V58.67 AVS given. Pt expressed understanding of instructions

## 2018-09-17 ENCOUNTER — TELEPHONE (OUTPATIENT)
Dept: FAMILY MEDICINE CLINIC | Age: 53
End: 2018-09-17

## 2018-09-17 NOTE — TELEPHONE ENCOUNTER
Tana with [facility] is calling needing the last office note in ref to an order for tens unit she can be reached at [phone] xtn  fax # [phone]

## 2018-09-19 ENCOUNTER — HOSPITAL ENCOUNTER (EMERGENCY)
Age: 53
Discharge: HOME | End: 2018-09-19
Attending: EMERGENCY MEDICINE
Payer: MEDICAID

## 2018-09-19 ENCOUNTER — APPOINTMENT (OUTPATIENT)
Dept: GENERAL RADIOLOGY | Age: 53
End: 2018-09-19
Attending: PHYSICIAN ASSISTANT
Payer: MEDICAID

## 2018-09-19 ENCOUNTER — APPOINTMENT (OUTPATIENT)
Dept: CT IMAGING | Age: 53
End: 2018-09-19
Attending: EMERGENCY MEDICINE
Payer: MEDICAID

## 2018-09-19 VITALS
HEIGHT: 65 IN | SYSTOLIC BLOOD PRESSURE: 115 MMHG | BODY MASS INDEX: 40.29 KG/M2 | TEMPERATURE: 98.4 F | OXYGEN SATURATION: 92 % | HEART RATE: 90 BPM | RESPIRATION RATE: 13 BRPM | WEIGHT: 241.84 LBS | DIASTOLIC BLOOD PRESSURE: 94 MMHG

## 2018-09-19 DIAGNOSIS — G89.29 CHEST WALL PAIN, CHRONIC: Primary | ICD-10-CM

## 2018-09-19 DIAGNOSIS — R07.89 CHEST WALL PAIN, CHRONIC: Primary | ICD-10-CM

## 2018-09-19 DIAGNOSIS — R93.89 INFILTRATE NOTED ON IMAGING STUDY: ICD-10-CM

## 2018-09-19 LAB
ALBUMIN SERPL-MCNC: 4.2 G/DL (ref 3.5–5)
ALBUMIN/GLOB SERPL: 1.1 {RATIO} (ref 1.1–2.2)
ALP SERPL-CCNC: 59 U/L (ref 45–117)
ALT SERPL-CCNC: 40 U/L (ref 12–78)
AMPHET UR QL SCN: NEGATIVE
ANION GAP SERPL CALC-SCNC: 7 MMOL/L (ref 5–15)
AST SERPL-CCNC: 21 U/L (ref 15–37)
BARBITURATES UR QL SCN: NEGATIVE
BASOPHILS # BLD: 0 K/UL (ref 0–0.1)
BASOPHILS NFR BLD: 1 % (ref 0–1)
BENZODIAZ UR QL: NEGATIVE
BILIRUB SERPL-MCNC: 0.4 MG/DL (ref 0.2–1)
BUN SERPL-MCNC: 15 MG/DL (ref 6–20)
BUN/CREAT SERPL: 16 (ref 12–20)
CALCIUM SERPL-MCNC: 8.7 MG/DL (ref 8.5–10.1)
CANNABINOIDS UR QL SCN: NEGATIVE
CHLORIDE SERPL-SCNC: 102 MMOL/L (ref 97–108)
CK MB CFR SERPL CALC: 1.5 % (ref 0–2.5)
CK MB SERPL-MCNC: 4 NG/ML (ref 5–25)
CK SERPL-CCNC: 269 U/L (ref 26–192)
CO2 SERPL-SCNC: 30 MMOL/L (ref 21–32)
COCAINE UR QL SCN: NEGATIVE
COMMENT, HOLDF: NORMAL
CREAT SERPL-MCNC: 0.92 MG/DL (ref 0.55–1.02)
DIFFERENTIAL METHOD BLD: NORMAL
DRUG SCRN COMMENT,DRGCM: NORMAL
EOSINOPHIL # BLD: 0.2 K/UL (ref 0–0.4)
EOSINOPHIL NFR BLD: 3 % (ref 0–7)
ERYTHROCYTE [DISTWIDTH] IN BLOOD BY AUTOMATED COUNT: 12.9 % (ref 11.5–14.5)
GLOBULIN SER CALC-MCNC: 3.8 G/DL (ref 2–4)
GLUCOSE BLD STRIP.AUTO-MCNC: 232 MG/DL (ref 65–100)
GLUCOSE SERPL-MCNC: 225 MG/DL (ref 65–100)
HCT VFR BLD AUTO: 44.1 % (ref 35–47)
HGB BLD-MCNC: 14.8 G/DL (ref 11.5–16)
IMM GRANULOCYTES # BLD: 0 K/UL (ref 0–0.04)
IMM GRANULOCYTES NFR BLD AUTO: 0 % (ref 0–0.5)
INR PPP: 1 (ref 0.9–1.1)
LYMPHOCYTES # BLD: 1.9 K/UL (ref 0.8–3.5)
LYMPHOCYTES NFR BLD: 30 % (ref 12–49)
MCH RBC QN AUTO: 29.9 PG (ref 26–34)
MCHC RBC AUTO-ENTMCNC: 33.6 G/DL (ref 30–36.5)
MCV RBC AUTO: 89.1 FL (ref 80–99)
METHADONE UR QL: NEGATIVE
MONOCYTES # BLD: 0.4 K/UL (ref 0–1)
MONOCYTES NFR BLD: 6 % (ref 5–13)
NEUTS SEG # BLD: 3.8 K/UL (ref 1.8–8)
NEUTS SEG NFR BLD: 60 % (ref 32–75)
NRBC # BLD: 0 K/UL (ref 0–0.01)
NRBC BLD-RTO: 0 PER 100 WBC
OPIATES UR QL: NEGATIVE
PCP UR QL: NEGATIVE
PLATELET # BLD AUTO: 310 K/UL (ref 150–400)
PMV BLD AUTO: 9.4 FL (ref 8.9–12.9)
POTASSIUM SERPL-SCNC: 4 MMOL/L (ref 3.5–5.1)
PROT SERPL-MCNC: 8 G/DL (ref 6.4–8.2)
PROTHROMBIN TIME: 10 SEC (ref 9–11.1)
RBC # BLD AUTO: 4.95 M/UL (ref 3.8–5.2)
SAMPLES BEING HELD,HOLD: NORMAL
SERVICE CMNT-IMP: ABNORMAL
SODIUM SERPL-SCNC: 139 MMOL/L (ref 136–145)
TROPONIN I BLD-MCNC: <0.04 NG/ML (ref 0–0.08)
TROPONIN I SERPL-MCNC: <0.05 NG/ML
TROPONIN I SERPL-MCNC: <0.05 NG/ML
TSH SERPL DL<=0.05 MIU/L-ACNC: 1.16 UIU/ML (ref 0.36–3.74)
WBC # BLD AUTO: 6.4 K/UL (ref 3.6–11)

## 2018-09-19 PROCEDURE — 74011250636 HC RX REV CODE- 250/636: Performed by: EMERGENCY MEDICINE

## 2018-09-19 PROCEDURE — 82550 ASSAY OF CK (CPK): CPT | Performed by: PHYSICIAN ASSISTANT

## 2018-09-19 PROCEDURE — 84484 ASSAY OF TROPONIN QUANT: CPT | Performed by: PHYSICIAN ASSISTANT

## 2018-09-19 PROCEDURE — 84443 ASSAY THYROID STIM HORMONE: CPT | Performed by: EMERGENCY MEDICINE

## 2018-09-19 PROCEDURE — 96374 THER/PROPH/DIAG INJ IV PUSH: CPT

## 2018-09-19 PROCEDURE — 80053 COMPREHEN METABOLIC PANEL: CPT | Performed by: PHYSICIAN ASSISTANT

## 2018-09-19 PROCEDURE — 85025 COMPLETE CBC W/AUTO DIFF WBC: CPT | Performed by: PHYSICIAN ASSISTANT

## 2018-09-19 PROCEDURE — 36415 COLL VENOUS BLD VENIPUNCTURE: CPT | Performed by: PHYSICIAN ASSISTANT

## 2018-09-19 PROCEDURE — 96361 HYDRATE IV INFUSION ADD-ON: CPT

## 2018-09-19 PROCEDURE — 80307 DRUG TEST PRSMV CHEM ANLYZR: CPT | Performed by: EMERGENCY MEDICINE

## 2018-09-19 PROCEDURE — 82962 GLUCOSE BLOOD TEST: CPT

## 2018-09-19 PROCEDURE — 71046 X-RAY EXAM CHEST 2 VIEWS: CPT

## 2018-09-19 PROCEDURE — 99285 EMERGENCY DEPT VISIT HI MDM: CPT

## 2018-09-19 PROCEDURE — 74011636320 HC RX REV CODE- 636/320: Performed by: EMERGENCY MEDICINE

## 2018-09-19 PROCEDURE — 85610 PROTHROMBIN TIME: CPT | Performed by: EMERGENCY MEDICINE

## 2018-09-19 PROCEDURE — 93005 ELECTROCARDIOGRAM TRACING: CPT

## 2018-09-19 PROCEDURE — 71275 CT ANGIOGRAPHY CHEST: CPT

## 2018-09-19 RX ORDER — AZITHROMYCIN 250 MG/1
TABLET, FILM COATED ORAL
Qty: 6 TAB | Refills: 0 | Status: SHIPPED | OUTPATIENT
Start: 2018-09-19 | End: 2018-09-24

## 2018-09-19 RX ORDER — SODIUM CHLORIDE 9 MG/ML
50 INJECTION, SOLUTION INTRAVENOUS
Status: COMPLETED | OUTPATIENT
Start: 2018-09-19 | End: 2018-09-19

## 2018-09-19 RX ORDER — AZITHROMYCIN 250 MG/1
TABLET, FILM COATED ORAL
Qty: 6 TAB | Refills: 0 | Status: SHIPPED | OUTPATIENT
Start: 2018-09-19 | End: 2018-09-19

## 2018-09-19 RX ORDER — ONDANSETRON 2 MG/ML
4 INJECTION INTRAMUSCULAR; INTRAVENOUS
Status: COMPLETED | OUTPATIENT
Start: 2018-09-19 | End: 2018-09-19

## 2018-09-19 RX ORDER — SODIUM CHLORIDE 0.9 % (FLUSH) 0.9 %
10 SYRINGE (ML) INJECTION
Status: COMPLETED | OUTPATIENT
Start: 2018-09-19 | End: 2018-09-19

## 2018-09-19 RX ADMIN — Medication 10 ML: at 15:45

## 2018-09-19 RX ADMIN — SODIUM CHLORIDE 1000 ML: 900 INJECTION, SOLUTION INTRAVENOUS at 14:41

## 2018-09-19 RX ADMIN — SODIUM CHLORIDE 50 ML/HR: 900 INJECTION, SOLUTION INTRAVENOUS at 15:45

## 2018-09-19 RX ADMIN — ONDANSETRON 4 MG: 2 INJECTION INTRAMUSCULAR; INTRAVENOUS at 14:41

## 2018-09-19 RX ADMIN — IOPAMIDOL 100 ML: 755 INJECTION, SOLUTION INTRAVENOUS at 15:45

## 2018-09-19 NOTE — ED PROVIDER NOTES
EMERGENCY DEPARTMENT HISTORY AND PHYSICAL EXAM 
 
 
Date: 9/19/2018 Patient Name: Kaylie Mahan History of Presenting Illness Chief Complaint Patient presents with  Chest Pain  
  pt c/o cp--left sided nonradiating with some labored breathing and feels dizzy and lethargic.  pt is diabetic and is having nausea and vomiting but no fevers or diarrhea. pt is diaphoretic on presentation--states she feels dehydrated and c/o body cramps. History Provided By: Patient HPI: Larisa Stovall, 48 y.o. female with PMHx significant for DM, MI with stent x2, and CABG x3, presents ambulatory to the ED with cc of persistent left sided CP and left upper back pain x ~5 days. Pt also reports generalized muscle cramping, lightheadedness, generalized weakness, and fatigue since onset. She reports intermittent diaphoresis at baseline, but states she has been sweating more often since onset of current symptoms. She states she was seen by her PCP (9/14/18), and by her Cardiologist yesterday (9/18/18); she was advised to have blood drawn at outside facility, but was unable to get results before (9/21/18), and states she was referred to ED by her Cardiologist today after calling their office again. Pt denies hx of DVT or PE. She states she takes  mg and Plavix daily. Pt states she is currently taking cholesterol medication. She denies cocaine use since (11/2017). Pt specifically denies fever, chills, SOB, diarrhea, or ABD pain. There are no other complaints, changes, or physical findings at this time. PCP: David Patel MD 
 
Current Facility-Administered Medications Medication Dose Route Frequency Provider Last Rate Last Dose  ondansetron (ZOFRAN) injection 4 mg  4 mg IntraVENous NOW Jessica Johnson MD      
 
Current Outpatient Prescriptions Medication Sig Dispense Refill  diclofenac (VOLTAREN) 1 % gel Apply  to affected area four (4) times daily.  30 g 2  
  lidocaine (LIDODERM) 5 % 1 Patch by TransDERmal route every twelve (12) hours. 1 Each 2  
 rosuvastatin (CRESTOR) 20 mg tablet Take 1 Tab by mouth nightly. 90 Tab 3  clopidogrel (PLAVIX) 75 mg tab Take 1 Tab by mouth daily. 90 Tab 3  
 TENS Units scott Apply to right lower back for 30 min daily as needed for low back pain 1 Device 2  
 miscellaneous medical supply (BLOOD PRESSURE CUFF) misc Check blood pressure daily 1 Each 0  Blood Pressure Monitor (BLOOD PRESSURE KIT) kit Check blood pressure daily 1 Kit 0  
 fluticasone (FLOVENT DISKUS) 50 mcg/actuation inhaler Take  by inhalation.  amitriptyline (ELAVIL) 10 mg tablet 1 p.o. nightly for 2 weeks then 2 p.o. nightly for 2 weeks then 3 p.o. nightly. Indications: NEUROPATHIC PAIN 90 Tab 5  
 methocarbamol (ROBAXIN) 500 mg tablet 1 p.o. every 6 hours as needed spasms  Indications: Muscle Spasm 120 Tab 3  
 meclizine (ANTIVERT) 25 mg tablet Take 1 Tab by mouth three (3) times daily as needed. 60 Tab 1  
 metFORMIN (GLUCOPHAGE) 1,000 mg tablet Take 1,000 mg by mouth two (2) times daily (with meals).  diclofenac EC (VOLTAREN) 75 mg EC tablet Take 1 Tab by mouth two (2) times daily as needed. 30 Tab 2  
 oxyCODONE-acetaminophen (PERCOCET 7.5) 7.5-325 mg per tablet Take 1 Tab by mouth every eight (8) hours as needed for Pain. Max Daily Amount: 3 Tabs. 20 Tab 0  
 fenofibrate (LOFIBRA) 160 mg tablet Take 160 mg by mouth.  metoprolol tartrate (LOPRESSOR) 25 mg tablet Take 25 mg by mouth.  gabapentin (NEURONTIN) 300 mg capsule Take 3 Tabs by mouth.  potassium chloride SA (MICRO-K) 10 mEq capsule Take 10 mEq by mouth.  ONETOUCH ULTRA TEST strip  DULoxetine (CYMBALTA) 60 mg capsule Take 60 mg by mouth daily.  LANTUS 100 unit/mL injection  HUMALOG 100 unit/mL injection  pantoprazole (PROTONIX) 40 mg tablet  Insulin Syringe-Needle U-100 1 mL 31 x 5/16\" syrg   3 Past History Past Medical History: 
Past Medical History:  
Diagnosis Date  Diabetes ([facility] [address]  Fibula fracture   
 right  Hip fracture ([facility] [address]  Myocardial infarct ([facility] [address] Past Surgical History: 
Past Surgical History:  
Procedure Laterality Date  HX CERVICAL LAMINECTOMY  HX CORONARY ARTERY BYPASS GRAFT February 2017  HX GYN    
 HX ORTHOPAEDIC    
 cervical fusion Family History: 
Family History Problem Relation Age of Onset  Cancer Mother  Diabetes Mother  Heart Disease Mother  Heart Disease Father  Diabetes Father Social History: 
Social History Substance Use Topics  Smoking status: Former Smoker  Smokeless tobacco: Never Used  Alcohol use No  
 
 
Allergies: Allergies Allergen Reactions  Celexa [Citalopram] Other (comments)  Diflucan [Fluconazole] Nausea and Vomiting  Flagyl [Metronidazole] Hives  Lisinopril Other (comments) Low BP  
 Metformin Other (comments)  Neosporin [Benzalkonium Chloride] Other (comments)  Pcn [Penicillins] Hives  Sulfa (Sulfonamide Antibiotics) Hives Review of Systems Review of Systems Constitutional: Positive for diaphoresis (increased from baseline) and fatigue. Negative for chills and fever. HENT: Negative. Negative for congestion and rhinorrhea. Respiratory: Negative. Negative for cough, chest tightness and wheezing. Cardiovascular: Positive for chest pain. Negative for palpitations. Gastrointestinal: Negative. Negative for abdominal pain, constipation, nausea and vomiting. Endocrine: Negative. Genitourinary: Negative. Negative for decreased urine volume, flank pain, hematuria and pelvic pain. Musculoskeletal: Positive for myalgias (generalized). Negative for back pain and neck pain. Skin: Negative. Negative for color change, pallor and rash. Neurological: Positive for weakness (generalized) and light-headedness. Negative for dizziness, seizures, numbness and headaches. Hematological: Negative. Negative for adenopathy. Psychiatric/Behavioral: Negative. All other systems reviewed and are negative. Physical Exam  
Physical Exam  
Constitutional: She is oriented to person, place, and time. She appears well-developed and well-nourished. No distress. HENT:  
Head: Normocephalic and atraumatic. Mouth/Throat: No oropharyngeal exudate. Eyes: Conjunctivae are normal. Pupils are equal, round, and reactive to light. Right eye exhibits no discharge. Left eye exhibits no discharge. No scleral icterus. Neck: Normal range of motion. Neck supple. No JVD present. Cardiovascular: Normal rate, regular rhythm, normal heart sounds and intact distal pulses. Exam reveals no gallop and no friction rub. No murmur heard. Pulmonary/Chest: Effort normal and breath sounds normal. No stridor. No respiratory distress. She has no wheezes. She has no rales. She exhibits tenderness. She exhibits no crepitus, no edema and no retraction. Tenderness in wound but no redness or warmth or crepitus Abdominal: Soft. Normal aorta and bowel sounds are normal. She exhibits no distension, no pulsatile midline mass and no mass. There is no hepatosplenomegaly. There is no tenderness. There is no rigidity, no rebound, no guarding, no CVA tenderness, no tenderness at McBurney's point and negative Carl's sign. No hernia. Neurological: She is alert and oriented to person, place, and time. She displays normal reflexes. No cranial nerve deficit. She exhibits normal muscle tone. Coordination normal.  
Skin: Skin is warm. No rash noted. She is not diaphoretic. No pallor. Vitals reviewed. Diagnostic Study Results Labs - Recent Results (from the past 12 hour(s)) GLUCOSE, POC Collection Time: 09/19/18  1:34 PM  
Result Value Ref Range Glucose (POC) 232 (H) 65 - 100 mg/dL Performed by [employer] (PCT) CBC WITH AUTOMATED DIFF Collection Time: 09/19/18  2:05 PM  
Result Value Ref Range WBC 6.4 3.6 - 11.0 K/uL  
 RBC 4.95 3.80 - 5.20 M/uL  
 HGB 14.8 11.5 - 16.0 g/dL HCT 44.1 35.0 - 47.0 % MCV 89.1 80.0 - 99.0 FL  
 MCH 29.9 26.0 - 34.0 PG  
 MCHC 33.6 30.0 - 36.5 g/dL  
 RDW 12.9 11.5 - 14.5 % PLATELET 429 152 - 011 K/uL MPV 9.4 8.9 - 12.9 FL  
 NRBC 0.0 0  WBC ABSOLUTE NRBC 0.00 0.00 - 0.01 K/uL NEUTROPHILS 60 32 - 75 % LYMPHOCYTES 30 12 - 49 % MONOCYTES 6 5 - 13 % EOSINOPHILS 3 0 - 7 % BASOPHILS 1 0 - 1 % IMMATURE GRANULOCYTES 0 0.0 - 0.5 % ABS. NEUTROPHILS 3.8 1.8 - 8.0 K/UL  
 ABS. LYMPHOCYTES 1.9 0.8 - 3.5 K/UL  
 ABS. MONOCYTES 0.4 0.0 - 1.0 K/UL  
 ABS. EOSINOPHILS 0.2 0.0 - 0.4 K/UL  
 ABS. BASOPHILS 0.0 0.0 - 0.1 K/UL  
 ABS. IMM. GRANS. 0.0 0.00 - 0.04 K/UL  
 DF AUTOMATED METABOLIC PANEL, COMPREHENSIVE Collection Time: 09/19/18  2:05 PM  
Result Value Ref Range Sodium 139 136 - 145 mmol/L Potassium 4.0 3.5 - 5.1 mmol/L Chloride 102 97 - 108 mmol/L  
 CO2 30 21 - 32 mmol/L Anion gap 7 5 - 15 mmol/L Glucose 225 (H) 65 - 100 mg/dL BUN 15 6 - 20 MG/DL Creatinine 0.92 0.55 - 1.02 MG/DL  
 BUN/Creatinine ratio 16 12 - 20 GFR est AA >60 >60 ml/min/1.73m2 GFR est non-AA >60 >60 ml/min/1.73m2 Calcium 8.7 8.5 - 10.1 MG/DL Bilirubin, total 0.4 0.2 - 1.0 MG/DL  
 ALT (SGPT) 40 12 - 78 U/L  
 AST (SGOT) 21 15 - 37 U/L Alk. phosphatase 59 45 - 117 U/L Protein, total 8.0 6.4 - 8.2 g/dL Albumin 4.2 3.5 - 5.0 g/dL Globulin 3.8 2.0 - 4.0 g/dL A-G Ratio 1.1 1.1 - 2.2    
TROPONIN I Collection Time: 09/19/18  2:05 PM  
Result Value Ref Range Troponin-I, Qt. <0.05 <0.05 ng/mL CK W/ CKMB & INDEX Collection Time: 09/19/18  2:05 PM  
Result Value Ref Range  (H) 26 - 192 U/L  
 CK - MB 4.0 (H) <3.6 NG/ML  
 CK-MB Index 1.5 0 - 2.5 POC TROPONIN-I  Collection Time: 09/19/18  2:05 PM  
 Result Value Ref Range Troponin-I (POC) <0.04 0.00 - 0.08 ng/mL TSH 3RD GENERATION Collection Time: 09/19/18  2:05 PM  
Result Value Ref Range TSH 1.16 0.36 - 3.74 uIU/mL PROTHROMBIN TIME + INR Collection Time: 09/19/18  2:06 PM  
Result Value Ref Range INR 1.0 0.9 - 1.1 Prothrombin time 10.0 9.0 - 11.1 sec SAMPLES BEING HELD Collection Time: 09/19/18  2:06 PM  
Result Value Ref Range SAMPLES BEING HELD RED TOP   
 COMMENT Add-on orders for these samples will be processed based on acceptable specimen integrity and analyte stability, which may vary by analyte. DRUG SCREEN, URINE Collection Time: 09/19/18  5:15 PM  
Result Value Ref Range AMPHETAMINES NEGATIVE  NEG    
 BARBITURATES NEGATIVE  NEG BENZODIAZEPINES NEGATIVE  NEG    
 COCAINE NEGATIVE  NEG METHADONE NEGATIVE  NEG    
 OPIATES NEGATIVE  NEG    
 PCP(PHENCYCLIDINE) NEGATIVE  NEG    
 THC (TH-CANNABINOL) NEGATIVE  NEG Drug screen comment (NOTE) TROPONIN I Collection Time: 09/19/18  5:22 PM  
Result Value Ref Range Troponin-I, Qt. <0.05 <0.05 ng/mL Radiologic Studies -  
CT Results  (Last 48 hours) 09/19/18 1545  CTA [address] Final result Impression:  IMPRESSION:    
1. No evidence of acute pulmonary thromboembolism. 2. Heterogeneous groundglass appearance of the lung parenchyma which may be due  
to small airways disease. No major airspace disease. 3. Hepatic steatosis. Narrative:  EXAMINATION:  CTA CHEST W OR W WO CONT  
CLINICAL INFORMATION:  SOB back pain, left-sided back pain with shortness of  
breath, diaphoresis COMPARISON: Chest radiograph of earlier today TECHNIQUE: Precontrast  images were obtained to localize the volume for  
acquisition. Multislice helical CT arteriography was performed from the  
diaphragm to the thoracic inlet during uneventful rapid bolus intravenous administration of 100 mL of Isovue  370. Lung and soft tissue windows were  
generated. Post processing was performed to include 3D MIP  and coronal and  
sagittal reformatted images. CT dose reduction was achieved through the use of a  
standardized protocol tailored for this examination and automatic exposure  
control for dose modulation. Findings DIAGNOSTIC QUALITY: Adequate. PULMONARY EMBOLISM: None. PULMONARY ARTERIES:  Normal in caliber. LUNG PARENCHYMA: No masses. No major areas of consolidation. Heterogeneous  
groundglass appearance of the lung parenchyma which may be due to small airways  
disease. PLEURAL EFFUSION: None. CENTRAL AIRWAYS:  Patent. ADENOPATHY:  None. HEART : Prior CABG. No cardiomegaly. GREAT VESSELS:  No significant abnormalities. UPPER ABDOMEN:  Hepatic steatosis. BONES:  Prior median sternotomy with fracture of 2 sternal wires and lack of  
fusion of the sternotomy defect. THYROID: Normal size. Subcentimeter low-attenuation nodule in the left lobe. CXR Results  (Last 48 hours) 09/19/18 1509  XR CHEST PA LAT Final result Impression:  IMPRESSION: No evidence of an acute cardiopulmonary process. Narrative:  EXAM:  XR CHEST PA LAT INDICATION: chest pain COMPARISON: X-ray June 2015 and August 2014. TECHNIQUE: Frontal and lateral views of the chest  
   
FINDINGS: No lobar consolidation, pleural effusion, or pneumothorax. The  
cardiomediastinal silhouette is not enlarged. There is been interval median  
sternotomy with fractures of the most superior and inferior wires. .  No acute or  
aggressive osseous lesion. Medical Decision Making I am the first provider for this patient. I reviewed the vital signs, available nursing notes, past medical history, past surgical history, family history and social history. Vital Signs-Reviewed the patient's vital signs. Patient Vitals for the past 12 hrs: 
 Temp Pulse Resp BP SpO2  
09/19/18 1730 - 90 13 (!) 115/94 92 % 09/19/18 1630 - 92 15 117/65 94 % 09/19/18 1615 - 92 14 126/77 96 % 09/19/18 1445 - 81 13 122/79 93 % 09/19/18 1430 - 97 16 141/77 90 % 09/19/18 1415 - 83 13 139/77 95 % 09/19/18 1405 - - - 138/76 -  
09/19/18 1333 98.4 °F (36.9 °C) 85 17 105/71 96 % Pulse Oximetry Analysis - 96% on RA Cardiac Monitor:  
Rate: 85 bpm 
Rhythm: Normal Sinus Rhythm EKG interpretation: 13:35 Rhythm: normal sinus rhythm with left anterior fascicular block. Rate (approx.): 85; Axis: normal; IA interval: normal; QRS interval: normal ; ST/T wave: ST and T wave abnormality; Other findings: no change from (8/30/17). Records Reviewed: Nursing Notes and Old Medical Records Provider Notes (Medical Decision Making): DDx: post-surgical complication, sternal wound infection, coronary syndrome, aortic dissection, PE, orthostatic hypotension, dehydration, endocrine dysfunction Impression/Plan: complex pt with prior substance abuse hx, and coronary bypass, to the ED with recurrent atypical CP, lightheadedness. In fact, has been seen by her PCP and her Cardiology group yesterday. Was referred here for possible blood work. Will consider CT to rule out thromboembolic aortic issue; otherwise, if normal, will refer back to Cardiology. ED Course:  
Initial assessment performed. The patients presenting problems have been discussed, and they are in agreement with the care plan formulated and outlined with them. I have encouraged them to ask questions as they arise throughout their visit. Disposition: 
DISCHARGE NOTE: 
6:19 PM 
The patient is ready for discharge. The patients signs, symptoms, diagnosis, and instructions for discharge have been discussed and the pt has conveyed their understanding. The patient is to follow up as recommended or return to the ER should their symptoms worsen.  Plan has been discussed and patient has conveyed their agreement. PLAN: 
1. Current Discharge Medication List  
  
START taking these medications Details  
azithromycin (ZITHROMAX Z-JUNE) 250 mg tablet Take 2 tablets by mouth today then 1 tablet every day 
Qty: 6 Tab, Refills: 0  
  
  
 
2. Follow-up Information Follow up With Details Comments Contact Info Chris Soria MD Schedule an appointment as soon as possible for a visit in 1 day  [address] [facility] [address] 
[phone] [facility] EMERGENCY DEPT  If symptoms worsen [address] [facility] [address] [facility] 
[phone] Return to ED if worse Diagnosis Clinical Impression: 1. Chest wall pain, chronic 2. Infiltrate noted on imaging study Attestations: This note is prepared by Dandre Snell, acting as Scribe for Jamaal Tai MD. Jamaal Tai MD: The scribe's documentation has been prepared under my direction and personally reviewed by me in its entirety. I confirm that the note above accurately reflects all work, treatment, procedures, and medical decision making performed by me.

## 2018-09-19 NOTE — ED NOTES
Bedside and Verbal shift change report given to Danis Novoa RN (oncoming nurse) by Jamel RN (offgoing nurse). Report included the following information Kardex, [facility], ED summary

## 2018-09-19 NOTE — DISCHARGE INSTRUCTIONS
Chest Pain: Care Instructions  Your Care Instructions    There are many things that can cause chest pain. Some are not serious and will get better on their own in a few days. But some kinds of chest pain need more testing and treatment. Your doctor may have recommended a follow-up visit in the next 8 to 12 hours. If you are not getting better, you may need more tests or treatment. Even though your doctor has released you, you still need to watch for any problems. The doctor carefully checked you, but sometimes problems can develop later. If you have new symptoms or if your symptoms do not get better, get medical care right away. If you have worse or different chest pain or pressure that lasts more than 5 minutes or you passed out (lost consciousness), call 911 or seek other emergency help right away. A medical visit is only one step in your treatment. Even if you feel better, you still need to do what your doctor recommends, such as going to all suggested follow-up appointments and taking medicines exactly as directed. This will help you recover and help prevent future problems. How can you care for yourself at home? · Rest until you feel better. · Take your medicine exactly as prescribed. Call your doctor if you think you are having a problem with your medicine. · Do not drive after taking a prescription pain medicine. When should you call for help? Call 911 if:    · You passed out (lost consciousness).     · You have severe difficulty breathing.     · You have symptoms of a heart attack. These may include:  ¨ Chest pain or pressure, or a strange feeling in your chest.  ¨ Sweating. ¨ Shortness of breath. ¨ Nausea or vomiting. ¨ Pain, pressure, or a strange feeling in your back, neck, jaw, or upper belly or in one or both shoulders or arms. ¨ Lightheadedness or sudden weakness. ¨ A fast or irregular heartbeat.   After you call 911, the  may tell you to chew 1 adult-strength or 2 to 4 low-dose aspirin. Wait for an ambulance. Do not try to drive yourself.    Call your doctor today if:    · You have any trouble breathing.     · Your chest pain gets worse.     · You are dizzy or lightheaded, or you feel like you may faint.     · You are not getting better as expected.     · You are having new or different chest pain. Where can you learn more? Go to http://cintia-venancio.info/. Enter A120 in the search box to learn more about \"Chest Pain: Care Instructions. \"  Current as of: 2017  Content Version: 11.7  © 1368-8657 [employer]. Care instructions adapted under license by [employer] (which disclaims liability or warranty for this information). If you have questions about a medical condition or this instruction, always ask your healthcare professional. [address] any warranty or liability for your use of this information. [employer]no[employer]te Activation    Thank you for requesting access to [employer]. Please follow the instructions below to securely access and download your online medical record. [employer]te allows you to send messages to your doctor, view your test results, renew your prescriptions, schedule appointments, and more. How Do I Sign Up? 1. In your internet browser, go to [email or web address]  2. Click on the First Time User? Click Here link in the Sign In box. You will be redirect to the New Member Sign Up page. 3. Enter your [employer] Access Code exactly as it appears below. You will not need to use this code after youve completed the sign-up process. If you do not sign up before the expiration date, you must request a new code. In[employer]te Access Code: [email or web address]  Expires: 10/8/2018 12:42 PM (This is the date your In[employer]te access code will )    4. Enter the last four digits of your Social Security Number (xxxx) and Date of Birth (mm/dd/yyyy) as indicated and click Submit.  You will be taken to the next sign-up page. 5. Create a [employer] ID. This will be your [employer]ity login ID and cannot be changed, so think of one that is secure and easy to remember. 6. Create a [employer]ity password. You can change your password at any time. 7. Enter your Password Reset Question and Answer. This can be used at a later time if you forget your password. 8. Enter your e-mail address. You will receive e-mail notification when new information is available in [address]. 9. Click Sign Up. You can now view and download portions of your medical record. 10. Click the Download Summary menu link to download a portable copy of your medical information. Additional Information    If you have questions, please visit the Frequently Asked Questions section of the [employer] website at [email or web address] "[email or web address] Remember, [employer]ity is NOT to be used for urgent needs. For medical emergencies, dial 911.

## 2018-09-19 NOTE — ED NOTES
Dr. Shanda Sapp reviewed discharge instructions with the patient. The patient verbalized understanding. All questions and concerns were addressed. The patient declined a wheelchair and is discharged ambulatory in the care of family members with instructions and prescriptions in hand. Pt is alert and oriented x 4. Respirations are clear and unlabored.

## 2018-09-20 ENCOUNTER — TELEPHONE (OUTPATIENT)
Dept: FAMILY MEDICINE CLINIC | Age: 53
End: 2018-09-20

## 2018-09-20 LAB
ATRIAL RATE: 85 BPM
CALCULATED P AXIS, ECG09: 58 DEGREES
CALCULATED R AXIS, ECG10: -53 DEGREES
CALCULATED T AXIS, ECG11: 120 DEGREES
DIAGNOSIS, 93000: NORMAL
P-R INTERVAL, ECG05: 164 MS
Q-T INTERVAL, ECG07: 370 MS
QRS DURATION, ECG06: 112 MS
QTC CALCULATION (BEZET), ECG08: 440 MS
VENTRICULAR RATE, ECG03: 85 BPM

## 2018-09-20 NOTE — TELEPHONE ENCOUNTER
Tried to return patient's call. Her question was about lethargy, body aches. Seen in emergency room yesterday given a Z-Juan. Based on blood work does not appear to be fighting serious infection, CT scan showed \"possible small airway disease\" so might be fighting some kind of respiratory infection.   Z-Juan sounds reasonable

## 2018-09-21 ENCOUNTER — OFFICE VISIT (OUTPATIENT)
Dept: FAMILY MEDICINE CLINIC | Age: 53
End: 2018-09-21

## 2018-09-21 VITALS
RESPIRATION RATE: 12 BRPM | BODY MASS INDEX: 41.99 KG/M2 | TEMPERATURE: 98 F | WEIGHT: 252 LBS | OXYGEN SATURATION: 98 % | HEART RATE: 106 BPM | DIASTOLIC BLOOD PRESSURE: 76 MMHG | HEIGHT: 65 IN | SYSTOLIC BLOOD PRESSURE: 122 MMHG

## 2018-09-21 DIAGNOSIS — M54.50 ACUTE LEFT-SIDED LOW BACK PAIN WITHOUT SCIATICA: ICD-10-CM

## 2018-09-21 DIAGNOSIS — E11.8 TYPE 2 DIABETES MELLITUS WITH COMPLICATION, WITH LONG-TERM CURRENT USE OF INSULIN (HCC): ICD-10-CM

## 2018-09-21 DIAGNOSIS — Z79.4 TYPE 2 DIABETES MELLITUS WITH COMPLICATION, WITH LONG-TERM CURRENT USE OF INSULIN (HCC): ICD-10-CM

## 2018-09-21 DIAGNOSIS — J06.9 URI WITH COUGH AND CONGESTION: Primary | ICD-10-CM

## 2018-09-21 RX ORDER — BENZONATATE 200 MG/1
200 CAPSULE ORAL
Qty: 21 CAP | Refills: 1 | Status: SHIPPED | OUTPATIENT
Start: 2018-09-21 | End: 2018-09-28

## 2018-09-21 RX ORDER — CYCLOBENZAPRINE HCL 10 MG
10 TABLET ORAL
Qty: 30 TAB | Refills: 1 | Status: SHIPPED | OUTPATIENT
Start: 2018-09-21 | End: 2019-06-05

## 2018-09-21 NOTE — MR AVS SNAPSHOT
[address] [facility] 
 
 
 [address] [place] [address] Ash Castillo 1364 [facility] 
[phone] Patient: Kalina Mccoy MRN: [id number] :1965 Visit Information Date & Time Provider Department Dept. Phone Encounter #  
 2018  3:00 PM MD Sugar VanDmitri Rueda 57 [facility] [phone] [id number] Upcoming Health Maintenance Date Due Hepatitis C Screening 1965 HEMOGLOBIN A1C Q6M 1965 FOOT EXAM Q1 1975 EYE EXAM RETINAL OR DILATED Q1 1975 Pneumococcal 19-64 Medium Risk (1 of 1 - PPSV23) 1984 DTaP/Tdap/Td series (1 - Tdap) 1986 PAP AKA CERVICAL CYTOLOGY 1986 BREAST CANCER SCRN MAMMOGRAM 2015 FOBT Q 1 YEAR AGE 50-75 2015 Influenza Age 5 to Adult 2019* MICROALBUMIN Q1 7/10/2019 LIPID PANEL Q1 2019 *Topic was postponed. The date shown is not the original due date. Allergies as of 2018  Review Complete On: 2018 By: Serge Valenzuela Severity Noted Reaction Type Reactions Celexa [Citalopram]  2014    Other (comments) Diflucan [Fluconazole]  2014    Nausea and Vomiting Flagyl [Metronidazole]  2014    Hives Lisinopril  2014    Other (comments) Low BP Metformin  2014    Other (comments) Neosporin [Benzalkonium Chloride]  2014    Other (comments) Pcn [Penicillins]  2014    Hives Sulfa (Sulfonamide Antibiotics)  2014    Hives Current Immunizations  Never Reviewed No immunizations on file. Not reviewed this visit You Were Diagnosed With   
  
 Codes Comments Type 2 diabetes mellitus with complication, with long-term current use of insulin (HCC)    -  Primary ICD-10-CM: E11.8, Z79.4 ICD-9-CM: 250.90, V58.67 Vitals BP Pulse Temp Resp Height(growth percentile) Weight(growth percentile)  122/76 (BP 1 Location: Left arm, BP Patient Position: Sitting) (!) 568 63 °F (36.7 °C) 12 5' 5\" (1.651 m) 252 lb (114.3 kg) SpO2 BMI OB Status Smoking Status 98% 41.93 kg/m2 Postmenopausal Former Smoker BMI and BSA Data Body Mass Index Body Surface Area 41.93 kg/m 2 2.29 m 2 Preferred Pharmacy Pharmacy Name Phone [facility] [address] , [address] [place] [address] Avenue [phone] Your Updated Medication List  
  
   
This list is accurate as of 9/21/18  3:23 PM.  Always use your most recent med list.  
  
  
  
  
 amitriptyline 10 mg tablet Commonly known as:  ELAVIL  
1 p.o. nightly for 2 weeks then 2 p.o. nightly for 2 weeks then 3 p.o. nightly. Indications: NEUROPATHIC PAIN  
  
 azithromycin 250 mg tablet Commonly known as:  Deretha Mackintosh Take 2 tablets by mouth today then 1 tablet every day  
  
 benzonatate 200 mg capsule Commonly known as:  TESSALON Take 1 Cap by mouth three (3) times daily as needed for Cough for up to 7 days. Blood Pressure Monitor Kit Commonly known as:  BLOOD PRESSURE KIT Check blood pressure daily  
  
 clopidogrel 75 mg Tab Commonly known as:  PLAVIX Take 1 Tab by mouth daily. cyclobenzaprine 10 mg tablet Commonly known as:  FLEXERIL Take 1 Tab by mouth nightly as needed for Muscle Spasm(s). * diclofenac EC 75 mg EC tablet Commonly known as:  VOLTAREN Take 1 Tab by mouth two (2) times daily as needed. * diclofenac 1 % Gel Commonly known as:  VOLTAREN Apply  to affected area four (4) times daily. DULoxetine 60 mg capsule Commonly known as:  CYMBALTA Take 60 mg by mouth daily. fenofibrate 160 mg tablet Commonly known as:  LOFIBRA Take 160 mg by mouth. fluticasone 27.5 mcg/actuation nasal spray Commonly known as:  Jupiter Landsman 2 Sprays by Nasal route daily. fluticasone 50 mcg/actuation inhaler Commonly known as:  FLOVENT DISKUS Take  by inhalation. HumaLOG U-100 Insulin 100 unit/mL injection Generic drug:  insulin lispro Insulin Syringe-Needle U-100 1 mL 31 gauge x 5/16 Syrg LANTUS U-100 INSULIN 100 unit/mL injection Generic drug:  insulin glargine  
  
 lidocaine 5 % Commonly known as:  LIDODERM  
1 Patch by TransDERmal route every twelve (12) hours. meclizine 25 mg tablet Commonly known as:  ANTIVERT Take 1 Tab by mouth three (3) times daily as needed. metFORMIN 1,000 mg tablet Commonly known as:  GLUCOPHAGE Take 1,000 mg by mouth two (2) times daily (with meals). methocarbamol 500 mg tablet Commonly known as:  ROBAXIN  
1 p.o. every 6 hours as needed spasms  Indications: Muscle Spasm  
  
 metoprolol tartrate 25 mg tablet Commonly known as:  LOPRESSOR Take 25 mg by mouth.  
  
 miscellaneous medical supply Misc Commonly known as:  BLOOD PRESSURE CUFF Check blood pressure daily NEURONTIN 300 mg capsule Generic drug:  gabapentin Take 3 Tabs by mouth. ONETOUCH ULTRA TEST strip Generic drug:  glucose blood VI test strips  
  
 oxyCODONE-acetaminophen 7.5-325 mg per tablet Commonly known as:  PERCOCET 7.5 Take 1 Tab by mouth every eight (8) hours as needed for Pain. Max Daily Amount: 3 Tabs. pantoprazole 40 mg tablet Commonly known as:  PROTONIX  
  
 potassium chloride SA 10 mEq capsule Commonly known as:  Delories Radar Take 10 mEq by mouth. rosuvastatin 20 mg tablet Commonly known as:  CRESTOR Take 1 Tab by mouth nightly. TENS Units Svetlana Camille Apply to right lower back for 30 min daily as needed for low back pain * Notice: This list has 2 medication(s) that are the same as other medications prescribed for you. Read the directions carefully, and ask your doctor or other care provider to review them with you. Prescriptions Sent to Pharmacy Refills  
 benzonatate (TESSALON) 200 mg capsule 1 Sig: Take 1 Cap by mouth three (3) times daily as needed for Cough for up to 7 days. Class: Normal  
 Pharmacy: [facility] DR NATASHA MITCHELL [address], [address] [place] [address] Ph #: [phone] Route: Oral  
 cyclobenzaprine (FLEXERIL) 10 mg tablet 1 Sig: Take 1 Tab by mouth nightly as needed for Muscle Spasm(s). Class: Normal  
 Pharmacy: [facility] DR NATASHA MITCHELL [address], [facility] [place] Ph #: [phone] Route: Oral  
 fluticasone (FLONASE SENSIMIST) 27.5 mcg/actuation nasal spray 2 Si Sprays by Nasal route daily. Class: Normal  
 Pharmacy: [facility] DR NATASHA NGOJONATHAN [address], [address] [facility] Ph #: [phone] Route: Nasal  
  
Introducing hospitals & [facility] SERVICES! Ludmila Atwood introduces Txt4 patient portal. Now you can access parts of your medical record, email your doctor's office, and request medication refills online. 1. In your internet browser, go to [email or web address] 2. Click on the First Time User? Click Here link in the Sign In box. You will see the New Member Sign Up page. 3. Enter your Txt4 Access Code exactly as it appears below. You will not need to use this code after youve completed the sign-up process. If you do not sign up before the expiration date, you must request a new code. · Txt4 Access Code: [facility] Expires: 10/8/2018 12:42 PM 
 
4. Enter the last four digits of your Social Security Number (xxxx) and Date of Birth (mm/dd/yyyy) as indicated and click Submit. You will be taken to the next sign-up page. 5. Create a [employer] ID. This will be your Txt4 login ID and cannot be changed, so think of one that is secure and easy to remember. 6. Create a Txt4 password. You can change your password at any time. 7. Enter your Password Reset Question and Answer. This can be used at a later time if you forget your password. 8. Enter your e-mail address. You will receive e-mail notification when new information is available in [address] 9. Click Sign Up. You can now view and download portions of your medical record. 10. Click the Download Summary menu link to download a portable copy of your medical information. If you have questions, please visit the Frequently Asked Questions section of the SlickLogin website. Remember, SlickLogin is NOT to be used for urgent needs. For medical emergencies, dial 911. Now available from your iPhone and Android! Please provide this summary of care documentation to your next provider. Your primary care clinician is listed as Lilly Agrawal. If you have any questions after today's visit, please call [phone].

## 2018-09-21 NOTE — PROGRESS NOTES
PARIS Stovall is a 48 y.o. female who presents with lethargy, body ache, tight feeling in her chest, low back pain, left-sided rib pain. This all started on Monday with lethargy and achiness. Felt like she wanted to the emergency room on Tuesday. Ultimately did go to the emergency room on Wednesday. Labs look normal but had a CT chest that showed \"possible small airway disease\" and thus was put on azithromycin. She did not take it until yesterday when she talked to me. Had a rough night last night. Has some runny nose, congestion. Slight cough but not her main symptoms PMHx: 
Past Medical History:  
Diagnosis Date  Diabetes ([facility]  Fibula fracture   
 right  Hip fracture ([facility]  Myocardial infarct ([facility] Meds:  
Current Outpatient Prescriptions Medication Sig Dispense Refill  benzonatate (TESSALON) 200 mg capsule Take 1 Cap by mouth three (3) times daily as needed for Cough for up to 7 days. 21 Cap 1  cyclobenzaprine (FLEXERIL) 10 mg tablet Take 1 Tab by mouth nightly as needed for Muscle Spasm(s). 30 Tab 1  
 fluticasone (FLONASE SENSIMIST) 27.5 mcg/actuation nasal spray 2 Sprays by Nasal route daily. 5.9 g 2  
 azithromycin (ZITHROMAX Z-JUNE) 250 mg tablet Take 2 tablets by mouth today then 1 tablet every day 6 Tab 0  
 diclofenac (VOLTAREN) 1 % gel Apply  to affected area four (4) times daily. 30 g 2  
 lidocaine (LIDODERM) 5 % 1 Patch by TransDERmal route every twelve (12) hours. 1 Each 2  
 rosuvastatin (CRESTOR) 20 mg tablet Take 1 Tab by mouth nightly. 90 Tab 3  clopidogrel (PLAVIX) 75 mg tab Take 1 Tab by mouth daily. 90 Tab 3  
 TENS Units scott Apply to right lower back for 30 min daily as needed for low back pain 1 Device 2  
 miscellaneous medical supply (BLOOD PRESSURE CUFF) misc Check blood pressure daily 1 Each 0  Blood Pressure Monitor (BLOOD PRESSURE KIT) kit Check blood pressure daily 1 Kit 0  
  fluticasone (FLOVENT DISKUS) 50 mcg/actuation inhaler Take  by inhalation.  amitriptyline (ELAVIL) 10 mg tablet 1 p.o. nightly for 2 weeks then 2 p.o. nightly for 2 weeks then 3 p.o. nightly. Indications: NEUROPATHIC PAIN 90 Tab 5  
 methocarbamol (ROBAXIN) 500 mg tablet 1 p.o. every 6 hours as needed spasms  Indications: Muscle Spasm 120 Tab 3  
 meclizine (ANTIVERT) 25 mg tablet Take 1 Tab by mouth three (3) times daily as needed. 60 Tab 1  
 metFORMIN (GLUCOPHAGE) 1,000 mg tablet Take 1,000 mg by mouth two (2) times daily (with meals).  diclofenac EC (VOLTAREN) 75 mg EC tablet Take 1 Tab by mouth two (2) times daily as needed. 30 Tab 2  
 oxyCODONE-acetaminophen (PERCOCET 7.5) 7.5-325 mg per tablet Take 1 Tab by mouth every eight (8) hours as needed for Pain. Max Daily Amount: 3 Tabs. 20 Tab 0  
 fenofibrate (LOFIBRA) 160 mg tablet Take 160 mg by mouth.  metoprolol tartrate (LOPRESSOR) 25 mg tablet Take 25 mg by mouth.  gabapentin (NEURONTIN) 300 mg capsule Take 3 Tabs by mouth.  potassium chloride SA (MICRO-K) 10 mEq capsule Take 10 mEq by mouth.  ONETOUCH ULTRA TEST strip  DULoxetine (CYMBALTA) 60 mg capsule Take 60 mg by mouth daily.  LANTUS 100 unit/mL injection  HUMALOG 100 unit/mL injection  pantoprazole (PROTONIX) 40 mg tablet  Insulin Syringe-Needle U-100 1 mL 31 x 5/16\" syrg   3 Allergies: Allergies Allergen Reactions  Celexa [Citalopram] Other (comments)  Diflucan [Fluconazole] Nausea and Vomiting  Flagyl [Metronidazole] Hives  Lisinopril Other (comments) Low BP  
 Metformin Other (comments)  Neosporin [Benzalkonium Chloride] Other (comments)  Pcn [Penicillins] Hives  Sulfa (Sulfonamide Antibiotics) Hives Smoker: 
History Smoking Status  Former Smoker Smokeless Tobacco  
 Never Used ETOH:  
History Alcohol Use No  
 
 
FH:  
Family History Problem Relation Age of Onset  Cancer Mother  Diabetes Mother  Heart Disease Mother  Heart Disease Father  Diabetes Father ROS:  
As listed in HPI. In addition: 
Constitutional:   No headache, fever, fatigue, weight loss or weight gain Cardiac:    No chest pain Resp:   No cough or shortness of breath Neuro   No loss of consciousness, dizziness, seizures Physical Exam: 
Blood pressure 122/76, pulse (!) 106, temperature 98 °F (36.7 °C), resp. rate 12, height 5' 5\" (1.651 m), weight 252 lb (114.3 kg), SpO2 98 %. GEN: No apparent distress. Alert and oriented and responds to all questions appropriately. NEUROLOGIC:  No focal neurologic deficits. Strength and sensation grossly intact. Coordination and gait grossly intact. EXT: Well perfused. No edema. SKIN: No obvious rashes. Bilateral nasal congestion, erythema, tympanic membranes are red without effusion. Postnasal drip, no pharyngeal erythema. Lungs clear to auscultation bilaterally, no wheeze no rales Low back examined. Exquisite tenderness left antonieta spine a, this is pulling down her ribs and causing left-sided rib pain Assessment and Plan Objectively appears to have URI. Labs from 2 days to go were normal.  CT findings probably spurious since not really having lower respiratory symptoms. Suspect viral URI. Flu is negative in the emergency room. The associated body aches are her primary complaints. Having low backache is pulling down on her ribs. This is worse for this patient with her sternotomy pain Flexeril for low back pain and to help her sleep. Finds Robaxin useful during the day because she does not find it sedating NSAID Tylenol Flonase for nasal congestion Tessalon for cough Is not really eating anything at the moment. Is having trouble maintaining her hydration so encouraged to this. Fasting sugar today was 130 ICD-10-CM ICD-9-CM 1. URI with cough and congestion J06.9 465.9 benzonatate (TESSALON) 200 mg capsule  
   fluticasone (FLONASE SENSIMIST) 27.5 mcg/actuation nasal spray 2. Type 2 diabetes mellitus with complication, with long-term current use of insulin (HCC) E11.8 250.90   
 Z79.4 V58.67   
3. Acute left-sided low back pain without sciatica M54.5 724.2 cyclobenzaprine (FLEXERIL) 10 mg tablet AVS given. Pt expressed understanding of instructions

## 2018-09-24 ENCOUNTER — TELEPHONE (OUTPATIENT)
Dept: FAMILY MEDICINE CLINIC | Age: 53
End: 2018-09-24

## 2018-09-24 NOTE — TELEPHONE ENCOUNTER
ER physician called. Blood work unremarkable, x-ray unremarkable. Complaining of the same rib type left-sided chest pain that she told me about. Likely due to coughing. Having more chest pain and dyspnea she says.   Physician will try Lidoderm

## 2018-09-25 ENCOUNTER — TELEPHONE (OUTPATIENT)
Dept: FAMILY MEDICINE CLINIC | Age: 53
End: 2018-09-25

## 2018-09-28 ENCOUNTER — TELEPHONE (OUTPATIENT)
Dept: FAMILY MEDICINE CLINIC | Age: 53
End: 2018-09-28

## 2018-09-28 NOTE — TELEPHONE ENCOUNTER
Tana is calling from [address] [place] [address] to let Dr Moni Araiza know insurance denied Tens Unit states reason experimental

## 2018-10-02 ENCOUNTER — OFFICE VISIT (OUTPATIENT)
Dept: FAMILY MEDICINE CLINIC | Age: 53
End: 2018-10-02

## 2018-10-02 VITALS
WEIGHT: 248 LBS | OXYGEN SATURATION: 96 % | DIASTOLIC BLOOD PRESSURE: 66 MMHG | BODY MASS INDEX: 41.32 KG/M2 | RESPIRATION RATE: 18 BRPM | HEIGHT: 65 IN | SYSTOLIC BLOOD PRESSURE: 96 MMHG | HEART RATE: 67 BPM | TEMPERATURE: 98.6 F

## 2018-10-02 DIAGNOSIS — I10 ESSENTIAL HYPERTENSION: ICD-10-CM

## 2018-10-02 DIAGNOSIS — R10.32 LEFT LOWER QUADRANT PAIN: ICD-10-CM

## 2018-10-02 DIAGNOSIS — R10.13 EPIGASTRIC PAIN: ICD-10-CM

## 2018-10-02 DIAGNOSIS — E78.1 HYPERTRIGLYCERIDEMIA: ICD-10-CM

## 2018-10-02 DIAGNOSIS — K21.9 GASTROESOPHAGEAL REFLUX DISEASE WITHOUT ESOPHAGITIS: ICD-10-CM

## 2018-10-02 DIAGNOSIS — E11.8 TYPE 2 DIABETES MELLITUS WITH COMPLICATION, WITH LONG-TERM CURRENT USE OF INSULIN (HCC): ICD-10-CM

## 2018-10-02 DIAGNOSIS — R11.0 NAUSEA: Primary | ICD-10-CM

## 2018-10-02 DIAGNOSIS — Z95.1 S/P CABG X 3: ICD-10-CM

## 2018-10-02 DIAGNOSIS — Z79.4 TYPE 2 DIABETES MELLITUS WITH COMPLICATION, WITH LONG-TERM CURRENT USE OF INSULIN (HCC): ICD-10-CM

## 2018-10-02 LAB
BILIRUB UR QL STRIP: NEGATIVE
GLUCOSE UR-MCNC: NEGATIVE MG/DL
KETONES P FAST UR STRIP-MCNC: NEGATIVE MG/DL
PH UR STRIP: 5.5 [PH] (ref 4.6–8)
PROT UR QL STRIP: NORMAL
SP GR UR STRIP: 1.03 (ref 1–1.03)
UA UROBILINOGEN AMB POC: NORMAL (ref 0.2–1)
URINALYSIS CLARITY POC: NORMAL
URINALYSIS COLOR POC: NORMAL
URINE BLOOD POC: NEGATIVE
URINE LEUKOCYTES POC: NEGATIVE
URINE NITRITES POC: NEGATIVE

## 2018-10-02 RX ORDER — PANTOPRAZOLE SODIUM 40 MG/1
40 TABLET, DELAYED RELEASE ORAL DAILY
Qty: 90 TAB | Refills: 3 | Status: SHIPPED | OUTPATIENT
Start: 2018-10-02 | End: 2020-06-25

## 2018-10-02 RX ORDER — ONDANSETRON 8 MG/1
8 TABLET, ORALLY DISINTEGRATING ORAL
Qty: 30 TAB | Refills: 2 | Status: SHIPPED | OUTPATIENT
Start: 2018-10-02 | End: 2018-11-30 | Stop reason: SDUPTHER

## 2018-10-02 NOTE — PROGRESS NOTES
PARIS  Jennifer Stovall is a 48 y.o. female who presents complaining of weakness, body aches, shaking. I saw her 2 weeks ago with lethargy, body ache. At that time she was given azithromycin by the emergency room for \"possible small airway disease\" on a CT chest.  Had some runny nose, congestion. Had a slight cough and appeared to have left-sided rib pain from the cough. Objectively she appeared to have a URI with some rib pain due to her coughing. Try Flexeril for her low back. Try diclofenac as well. Did not find either particularly effective. Went to the emergency room 1 week ago complaining of the same left-sided rib pain. Had a chest x-ray and blood work that was unremarkable. ER physician called to discuss this. Tried lidocaine topical which patient has been using with some relief. Since that emergency room visit she is now endorsing symptoms such as weak, shaky, sensation that her legs are getting give out, nausea, abdominal pain. Points to her lower abdomen when describing this discomfort. She expresses a concern that this could have something to do with the increase in metformin that she has been taking. Shortly before she started feeling sick in mid September her metformin was increased to 2000 mg twice daily. She also tells me that she has long-standing reflux but stopped taking her Protonix a few months ago    PMHx:  Past Medical History:   Diagnosis Date    [facility] ([facility]     Fibula fracture     right    Hip fracture ([facility]     Myocardial infarct ([facility]        Meds:   Current Outpatient Prescriptions   Medication Sig Dispense Refill    pantoprazole (PROTONIX) 40 mg tablet Take 1 Tab by mouth daily. 90 Tab 3    ondansetron (ZOFRAN ODT) 8 mg disintegrating tablet Take 1 Tab by mouth every eight (8) hours as needed for Nausea. 30 Tab 2    cyclobenzaprine (FLEXERIL) 10 mg tablet Take 1 Tab by mouth nightly as needed for Muscle Spasm(s).  30 Tab 1    fluticasone (FLONASE SENSIMIST) 27.5 mcg/actuation nasal spray 2 Sprays by Nasal route daily. 5.9 g 2    lidocaine (LIDODERM) 5 % 1 Patch by TransDERmal route every twelve (12) hours. 1 Each 2    rosuvastatin (CRESTOR) 20 mg tablet Take 1 Tab by mouth nightly. 90 Tab 3    clopidogrel (PLAVIX) 75 mg tab Take 1 Tab by mouth daily. 90 Tab 3    miscellaneous medical supply (BLOOD PRESSURE CUFF) misc Check blood pressure daily 1 Each 0    Blood Pressure Monitor (BLOOD PRESSURE KIT) kit Check blood pressure daily 1 Kit 0    meclizine (ANTIVERT) 25 mg tablet Take 1 Tab by mouth three (3) times daily as needed. 60 Tab 1    metFORMIN (GLUCOPHAGE) 1,000 mg tablet Take 1,000 mg by mouth two (2) times daily (with meals).  diclofenac EC (VOLTAREN) 75 mg EC tablet Take 1 Tab by mouth two (2) times daily as needed. 30 Tab 2    oxyCODONE-acetaminophen (PERCOCET 7.5) 7.5-325 mg per tablet Take 1 Tab by mouth every eight (8) hours as needed for Pain. Max Daily Amount: 3 Tabs. 20 Tab 0    fenofibrate (LOFIBRA) 160 mg tablet Take 160 mg by mouth.  gabapentin (NEURONTIN) 300 mg capsule Take 3 Tabs by mouth.  ONETOUCH ULTRA TEST strip       DULoxetine (CYMBALTA) 60 mg capsule Take 60 mg by mouth daily.  LANTUS 100 unit/mL injection       HUMALOG 100 unit/mL injection       Insulin Syringe-Needle U-100 1 mL 31 x 5/16\" syrg   3    diclofenac (VOLTAREN) 1 % gel Apply  to affected area four (4) times daily. 30 g 2    TENS Units scott Apply to right lower back for 30 min daily as needed for low back pain 1 Device 2    fluticasone (FLOVENT DISKUS) 50 mcg/actuation inhaler Take  by inhalation.  amitriptyline (ELAVIL) 10 mg tablet 1 p.o. nightly for 2 weeks then 2 p.o. nightly for 2 weeks then 3 p.o. nightly. Indications: NEUROPATHIC PAIN 90 Tab 5    methocarbamol (ROBAXIN) 500 mg tablet 1 p.o. every 6 hours as needed spasms  Indications: Muscle Spasm 120 Tab 3       Allergies:    Allergies   Allergen Reactions    Celexa [Citalopram] Other (comments)    Diflucan [Fluconazole] Nausea and Vomiting    Flagyl [Metronidazole] Hives    Lisinopril Other (comments)     Low BP    Metformin Other (comments)    Neosporin [Benzalkonium Chloride] Other (comments)    Pcn [Penicillins] Hives    Sulfa (Sulfonamide Antibiotics) Hives       Smoker:  History   Smoking Status    Former Smoker   Smokeless Tobacco    Never Used       ETOH:   History   Alcohol Use No       FH:   Family History   Problem Relation Age of Onset    Cancer Mother     Diabetes Mother     Heart Disease Mother     Heart Disease Father     Diabetes Father        ROS:   As listed in HPI. In addition:  Constitutional:   No headache, fever, fatigue, weight loss or weight gain      Cardiac:    No chest pain      Resp:   No cough or shortness of breath      Neuro   No loss of consciousness, dizziness, seizures      Physical Exam:  Blood pressure 96/66, pulse 67, temperature 98.6 °F (37 °C), resp. rate 18, height 5' 5\" (1.651 m), weight 248 lb (112.5 kg), SpO2 96 %. GEN: No apparent distress. Alert and oriented and responds to all questions appropriately. NEUROLOGIC:  No focal neurologic deficits. Strength and sensation grossly intact. Coordination and gait grossly intact. EXT: Well perfused. No edema. SKIN: No obvious rashes. Lungs clear to auscultation bilaterally  CV regular rate and rhythm no murmur  Abdomen normoactive bowel sounds, soft, tender in the epigastrium in the left upper quadrant, particularly tender in the left lower quadrant in the right lower quadrant. To a lesser extent tender in the midline       Assessment and Plan     Abdominal pain  This is a new symptom in the last week  Urinalysis unremarkable  Symptoms not when he noticed any particular cause of her abdominal pain, will workup with labs and imaging. Favor CT abdomen/pelvis    Not sure what to make of the body aches/1 week cyst times 2 weeks.   We are attributing this to respiratory infection but this is going on a little longer than expected. Has already tried and failed antibiotics. Has a history of C. difficile and should not get antibiotics arbitrarily    She has a history of lactic acidosis from metformin use. She is nervous about this today and asks about it specifically. We will go ahead and check a lactic acid. Nausea with a history of GERD  Restart her Protonix  Continue her Zofran which she has found helpful  Check lipase      ICD-10-CM ICD-9-CM    1. Nausea R11.0 787.02 ondansetron (ZOFRAN ODT) 8 mg disintegrating tablet   2. Type 2 diabetes mellitus with complication, with long-term current use of insulin ([facility]) E11.8 250.90 LACTIC ACID    Z79.4 V58.67    3. Gastroesophageal reflux disease without esophagitis K21.9 530.81 pantoprazole (PROTONIX) 40 mg tablet   4. Hypertriglyceridemia E78.1 272.1    5. Essential hypertension U45 737.1 METABOLIC PANEL, COMPREHENSIVE      CBC WITH AUTOMATED DIFF   6. Left lower quadrant pain R10.32 789.04    7. S/P CABG x 3 Z95.1 V45.81    8. Epigastric pain R10.13 789.06 LIPASE       AVS given.  Pt expressed understanding of instructions

## 2018-10-02 NOTE — PROGRESS NOTES
.  Chief Complaint   Patient presents with    Generalized Body Aches    Nausea    Fatigue     1. Have you been to the ER, urgent care clinic since your last visit? Hospitalized since your last visit? Yes    2. Have you seen or consulted any other health care providers outside of the [address] [place] [address] since your last visit? Include any pap smears or colon screening.  Yes [address] [place] [address] A

## 2018-10-02 NOTE — MR AVS SNAPSHOT
Rene Reyes 
 
 
 [address] [place] [address] Jonas Adia [facility] 
[phone] Patient: Melinda Figueredo MRN: [id number] :[id number] Visit Information Date & Time Provider Department Dept. Phone Encounter #  
 10/2/2018  1:00 PM Yady Fernandes MD [address] [facility] [phone] [id number] Upcoming Health Maintenance Date Due Hepatitis C Screening 1965 HEMOGLOBIN A1C Q6M 1965 FOOT EXAM Q1 1975 EYE EXAM RETINAL OR DILATED Q1 1975 Pneumococcal 19-64 Medium Risk (1 of 1 - PPSV23) 1984 DTaP/Tdap/Td series (1 - Tdap) 1986 PAP AKA CERVICAL CYTOLOGY 1986 Shingrix Vaccine Age 50> (1 of 2) 2015 BREAST CANCER SCRN MAMMOGRAM 2015 FOBT Q 1 YEAR AGE 50-75 2015 Influenza Age 5 to Adult 2019* MICROALBUMIN Q1 7/10/2019 LIPID PANEL Q1 2019 *Topic was postponed. The date shown is not the original due date. Allergies as of 10/2/2018  Review Complete On: 10/2/2018 By: Yady Fernandes MD  
  
 Severity Noted Reaction Type Reactions Celexa [Citalopram]  2014    Other (comments) Diflucan [Fluconazole]  2014    Nausea and Vomiting Flagyl [Metronidazole]  2014    Hives Lisinopril  2014    Other (comments) Low BP Metformin  2014    Other (comments) Neosporin [Benzalkonium Chloride]  2014    Other (comments) Pcn [Penicillins]  2014    Hives Sulfa (Sulfonamide Antibiotics)  2014    Hives Current Immunizations  Never Reviewed No immunizations on file. Not reviewed this visit You Were Diagnosed With   
  
 Codes Comments Nausea    -  Primary ICD-10-CM: R11.0 ICD-9-CM: 787.02 Type 2 diabetes mellitus with complication, with long-term current use of insulin (HCC)     ICD-10-CM: E11.8, Z79.4 ICD-9-CM: 250.90, V58.67   
 Gastroesophageal reflux disease without esophagitis     ICD-10-CM: K21.9 ICD-9-CM: 530.81 Hypertriglyceridemia     ICD-10-CM: E78.1 ICD-9-CM: 272.1 Essential hypertension     ICD-10-CM: I10 
ICD-9-CM: 401.9 Left lower quadrant pain     ICD-10-CM: R10.32 
ICD-9-CM: 789.04 S/P CABG x 3     ICD-10-CM: Z95.1 ICD-9-CM: V45.81 Epigastric pain     ICD-10-CM: R10.13 ICD-9-CM: 789.06 Vitals BP Pulse Temp Resp Height(growth percentile) Weight(growth percentile) 96/66 (BP 1 Location: Left arm, BP Patient Position: Sitting) 67 98.6 °F (37 °C) 18 5' 5\" (1.651 m) 248 lb (112.5 kg) SpO2 BMI OB Status Smoking Status 96% 41.27 kg/m2 Postmenopausal Former Smoker Vitals History BMI and BSA Data Body Mass Index Body Surface Area  
 41.27 kg/m 2 2.27 m 2 Preferred Pharmacy Pharmacy Name Phone [address] [place] [phone] Your Updated Medication List  
  
   
This list is accurate as of 10/2/18  1:51 PM.  Always use your most recent med list.  
  
  
  
  
 amitriptyline 10 mg tablet Commonly known as:  ELAVIL  
1 p.o. nightly for 2 weeks then 2 p.o. nightly for 2 weeks then 3 p.o. nightly. Indications: NEUROPATHIC PAIN Blood Pressure Monitor Kit Commonly known as:  BLOOD PRESSURE KIT Check blood pressure daily  
  
 clopidogrel 75 mg Tab Commonly known as:  PLAVIX Take 1 Tab by mouth daily. cyclobenzaprine 10 mg tablet Commonly known as:  FLEXERIL Take 1 Tab by mouth nightly as needed for Muscle Spasm(s). * diclofenac EC 75 mg EC tablet Commonly known as:  VOLTAREN Take 1 Tab by mouth two (2) times daily as needed. * diclofenac 1 % Gel Commonly known as:  VOLTAREN Apply  to affected area four (4) times daily. DULoxetine 60 mg capsule Commonly known as:  CYMBALTA Take 60 mg by mouth daily. fenofibrate 160 mg tablet Commonly known as:  LOFIBRA Take 160 mg by mouth. fluticasone 27.5 mcg/actuation nasal spray Commonly known as:  Fernando Sizer 2 Sprays by Nasal route daily. fluticasone 50 mcg/actuation inhaler Commonly known as:  FLOVENT DISKUS Take  by inhalation. HumaLOG U-100 Insulin 100 unit/mL injection Generic drug:  insulin lispro Insulin Syringe-Needle U-100 1 mL 31 gauge x 5/16 Syrg LANTUS U-100 INSULIN 100 unit/mL injection Generic drug:  insulin glargine  
  
 lidocaine 5 % Commonly known as:  LIDODERM  
1 Patch by TransDERmal route every twelve (12) hours. meclizine 25 mg tablet Commonly known as:  ANTIVERT Take 1 Tab by mouth three (3) times daily as needed. metFORMIN 1,000 mg tablet Commonly known as:  GLUCOPHAGE Take 1,000 mg by mouth two (2) times daily (with meals). methocarbamol 500 mg tablet Commonly known as:  ROBAXIN  
1 p.o. every 6 hours as needed spasms  Indications: Muscle Spasm  
  
 miscellaneous medical supply Misc Commonly known as:  BLOOD PRESSURE CUFF Check blood pressure daily NEURONTIN 300 mg capsule Generic drug:  gabapentin Take 3 Tabs by mouth. ondansetron 8 mg disintegrating tablet Commonly known as:  ZOFRAN ODT Take 1 Tab by mouth every eight (8) hours as needed for Nausea. ONETOUCH ULTRA TEST strip Generic drug:  glucose blood VI test strips  
  
 oxyCODONE-acetaminophen 7.5-325 mg per tablet Commonly known as:  PERCOCET 7.5 Take 1 Tab by mouth every eight (8) hours as needed for Pain. Max Daily Amount: 3 Tabs. pantoprazole 40 mg tablet Commonly known as:  PROTONIX Take 1 Tab by mouth daily. rosuvastatin 20 mg tablet Commonly known as:  CRESTOR Take 1 Tab by mouth nightly. TENS Units Anola Ao Apply to right lower back for 30 min daily as needed for low back pain * Notice:   This list has 2 medication(s) that are the same as other medications prescribed for you. Read the directions carefully, and ask your doctor or other care provider to review them with you. Prescriptions Sent to Pharmacy Refills  
 pantoprazole (PROTONIX) 40 mg tablet 3 Sig: Take 1 Tab by mouth daily. Class: Normal  
 Pharmacy: [facility] [address], [place] Ph #: [phone] Route: Oral  
 ondansetron (ZOFRAN ODT) 8 mg disintegrating tablet 2 Sig: Take 1 Tab by mouth every eight (8) hours as needed for Nausea. Class: Normal  
 Pharmacy: [facility] [address], [place] Ph #: [phone] Route: Oral  
  
We Performed the Following CBC WITH AUTOMATED DIFF [76788 CPT(R)] LACTIC ACID [id number] CPT(R)] LIPASE [id number] CPT(R)] METABOLIC PANEL, COMPREHENSIVE [04944 CPT(R)] To-Do List   
 10/11/2018 1:00 PM  
  Appointment with DIABETES CLASS #3 [facility] at [facility] ([phone]) Introducing [facility]! Leesa Blake introduces Badge patient portal. Now you can access parts of your medical record, email your doctor's office, and request medication refills online. 1. In your internet browser, go to [email or web address] 2. Click on the First Time User? Click Here link in the Sign In box. You will see the New Member Sign Up page. 3. Enter your Badge Access Code exactly as it appears below. You will not need to use this code after youve completed the sign-up process. If you do not sign up before the expiration date, you must request a new code. · Badge Access Code: [facility] Expires: 10/8/2018 12:42 PM 
 
4. Enter the last four digits of your Social Security Number (xxxx) and Date of Birth (mm/dd/yyyy) as indicated and click Submit. You will be taken to the next sign-up page. 5. Create a Badge ID.  This will be your Badge login ID and cannot be changed, so think of one that is secure and easy to remember. 6. Create a [employer] password. You can change your password at any time. 7. Enter your Password Reset Question and Answer. This can be used at a later time if you forget your password. 8. Enter your e-mail address. You will receive e-mail notification when new information is available in [address] 9. Click Sign Up. You can now view and download portions of your medical record. 10. Click the Download Summary menu link to download a portable copy of your medical information. If you have questions, please visit the Frequently Asked Questions section of the [employer] website. Remember, [employer] is NOT to be used for urgent needs. For medical emergencies, dial 911. Now available from your iPhone and Android! Please provide this summary of care documentation to your next provider. Your primary care clinician is listed as Delane Osler. If you have any questions after today's visit, please call [phone].

## 2018-10-03 ENCOUNTER — TELEPHONE (OUTPATIENT)
Dept: FAMILY MEDICINE CLINIC | Age: 53
End: 2018-10-03

## 2018-10-03 LAB
ALBUMIN SERPL-MCNC: 4.7 G/DL (ref 3.5–5.5)
ALBUMIN/GLOB SERPL: 1.6 {RATIO} (ref 1.2–2.2)
ALP SERPL-CCNC: 53 IU/L (ref 39–117)
ALT SERPL-CCNC: 47 IU/L (ref 0–32)
AST SERPL-CCNC: 43 IU/L (ref 0–40)
BASOPHILS # BLD AUTO: 0 X10E3/UL (ref 0–0.2)
BASOPHILS NFR BLD AUTO: 0 %
BILIRUB SERPL-MCNC: 0.3 MG/DL (ref 0–1.2)
BUN SERPL-MCNC: 18 MG/DL (ref 6–24)
BUN/CREAT SERPL: 19 (ref 9–23)
CALCIUM SERPL-MCNC: 10.5 MG/DL (ref 8.7–10.2)
CHLORIDE SERPL-SCNC: 97 MMOL/L (ref 96–106)
CO2 SERPL-SCNC: 21 MMOL/L (ref 20–29)
CREAT SERPL-MCNC: 0.93 MG/DL (ref 0.57–1)
EOSINOPHIL # BLD AUTO: 0.3 X10E3/UL (ref 0–0.4)
EOSINOPHIL NFR BLD AUTO: 4 %
ERYTHROCYTE [DISTWIDTH] IN BLOOD BY AUTOMATED COUNT: 14 % (ref 12.3–15.4)
GLOBULIN SER CALC-MCNC: 2.9 G/DL (ref 1.5–4.5)
GLUCOSE SERPL-MCNC: 81 MG/DL (ref 65–99)
HCT VFR BLD AUTO: 43.5 % (ref 34–46.6)
HGB BLD-MCNC: 14.5 G/DL (ref 11.1–15.9)
IMM GRANULOCYTES # BLD: 0 X10E3/UL (ref 0–0.1)
IMM GRANULOCYTES NFR BLD: 0 %
LACTATE SERPL-MCNC: 22 MG/DL (ref 4.8–25.7)
LIPASE SERPL-CCNC: 64 U/L (ref 14–72)
LYMPHOCYTES # BLD AUTO: 2.8 X10E3/UL (ref 0.7–3.1)
LYMPHOCYTES NFR BLD AUTO: 35 %
MCH RBC QN AUTO: 30.5 PG (ref 26.6–33)
MCHC RBC AUTO-ENTMCNC: 33.3 G/DL (ref 31.5–35.7)
MCV RBC AUTO: 91 FL (ref 79–97)
MONOCYTES # BLD AUTO: 0.6 X10E3/UL (ref 0.1–0.9)
MONOCYTES NFR BLD AUTO: 7 %
NEUTROPHILS # BLD AUTO: 4.2 X10E3/UL (ref 1.4–7)
NEUTROPHILS NFR BLD AUTO: 54 %
PLATELET # BLD AUTO: 312 X10E3/UL (ref 150–379)
POTASSIUM SERPL-SCNC: 5.1 MMOL/L (ref 3.5–5.2)
PROT SERPL-MCNC: 7.6 G/DL (ref 6–8.5)
RBC # BLD AUTO: 4.76 X10E6/UL (ref 3.77–5.28)
SODIUM SERPL-SCNC: 139 MMOL/L (ref 134–144)
WBC # BLD AUTO: 7.8 X10E3/UL (ref 3.4–10.8)

## 2018-10-03 NOTE — TELEPHONE ENCOUNTER
All labs but lactic acid are back and look fairly normal, nothing that explains why she is feeling weak/shaky. If her symptoms do not improve she should return to clinic. Also, has her CT abdomen been scheduled yet?

## 2018-10-03 NOTE — TELEPHONE ENCOUNTER
Patient verified her name and . Patient notified of Dr. Nina Vega result note. Patient advised if not better to call in the am for appointment. Patient verbalized understanding. Patient will call to schedule CT scan today.

## 2018-10-03 NOTE — TELEPHONE ENCOUNTER
Patient called. Verified name and . Patient states she is weak and shaky. Patient states her muscles are sore. Patient states her hands and feet are cold. Patient states she cannot stand by herself. Patient is wanting to know about her results. Patient states she is eating and drinking fluids. Patient calmer after talking for a few minutes. Patient advised to rest for now. Patient advised I would return call once I received response from Dr. Donavan Pacheco.

## 2018-10-04 ENCOUNTER — OFFICE VISIT (OUTPATIENT)
Dept: FAMILY MEDICINE CLINIC | Age: 53
End: 2018-10-04

## 2018-10-04 ENCOUNTER — TELEPHONE (OUTPATIENT)
Dept: FAMILY MEDICINE CLINIC | Age: 53
End: 2018-10-04

## 2018-10-04 VITALS
HEIGHT: 65 IN | BODY MASS INDEX: 41.69 KG/M2 | WEIGHT: 250.2 LBS | TEMPERATURE: 98 F | OXYGEN SATURATION: 95 % | RESPIRATION RATE: 12 BRPM | HEART RATE: 81 BPM | DIASTOLIC BLOOD PRESSURE: 62 MMHG | SYSTOLIC BLOOD PRESSURE: 110 MMHG

## 2018-10-04 DIAGNOSIS — R07.89 STERNUM PAIN: ICD-10-CM

## 2018-10-04 DIAGNOSIS — M96.89 NONUNION OF STERNUM AFTER STERNOTOMY: ICD-10-CM

## 2018-10-04 DIAGNOSIS — R52 BODY ACHES: Primary | ICD-10-CM

## 2018-10-04 PROBLEM — E11.21 TYPE 2 DIABETES WITH NEPHROPATHY (HCC): Status: ACTIVE | Noted: 2018-10-04

## 2018-10-04 LAB
FLUAV+FLUBV AG NOSE QL IA.RAPID: NEGATIVE POS/NEG
FLUAV+FLUBV AG NOSE QL IA.RAPID: NEGATIVE POS/NEG
VALID INTERNAL CONTROL?: YES

## 2018-10-04 RX ORDER — OXYCODONE AND ACETAMINOPHEN 7.5; 325 MG/1; MG/1
1 TABLET ORAL
Qty: 20 TAB | Refills: 0 | Status: SHIPPED | OUTPATIENT
Start: 2018-10-04 | End: 2018-10-26 | Stop reason: SDUPTHER

## 2018-10-04 NOTE — PROGRESS NOTES
. 
Chief Complaint Patient presents with  Nausea  Muscle Pain 1. Have you been to the ER, urgent care clinic since your last visit? Hospitalized since your last visit? No 
 
2. Have you seen or consulted any other health care providers outside of the [address] [place] [address] since your last visit? Include any pap smears or colon screening. Savanah Duenas

## 2018-10-04 NOTE — TELEPHONE ENCOUNTER
She said she is still having nausea with Zofran and wanted to know what she can do. Talked to her about mild diet and foods less likely to cause nausea. Also monitor blood sugars and taking meds with food. If not better tomorrow she will call back. Was just seen by Dr. Graham Thomas today.

## 2018-10-04 NOTE — TELEPHONE ENCOUNTER
Called patient. She verified her name and . Patient states she is going to hospital. Patient declined Ov. Patient declined to speak with Dr. Barbara Mock. Patient states she is not coming here, because we can not do anything to help here. Tried to offer appointment again. Declined. Patient states she is going to [facility] so they can run tests and give her answers.

## 2018-10-04 NOTE — PROGRESS NOTES
Subjective:  
 
Jeff Stovall is a 48 y.o. female who presents today with the following: Chief Complaint Patient presents with  Nausea  Muscle Pain Patient here today for follow up of abdominal pain, nausea, weakness, fatigue and malaise. Since her last visit on Tuesday of this week (10/2), patient has continued to feel weak and shaky with muscle soreness along with extremity coldness. Her labs were all normal from Tuesdays visit except for slightly abnormal liver enzymes. Her lactic acid was also normal.  No signs of anion gap on CMP. She has continued eating and drinking with no vomiting. Patient states that she initially thought her symptoms were from the \"spot\" in her lungs that was causing infection. This was found on CT scan of her chest on 9/19. She has been having muscle aches, severe nausea that is not helped much by insulin. She feels weak and unstable on her feet. She is having difficulty breathing-like she has to work hard to get normal breaths. Blood sugars are running 120-220s. She is taking 100 units in the AM and 80 units at night. She also takes 30 units Humalog with each meal.  Patient has been a little inconsistent with her medication administration, she endorses skipping meals and insulin due to nausea and falling asleep for long periods of time. Last A1c was in August in diabetes education class; pt thinks it was 9.8. Pt next sees endocrinology in January. Of note, patient notes that she started feeling poorly when Metformin dose was increased in September. Patient continues to take Metformin. Patient also states that her muscle soreness is \"all over\". She feels weak as well. She feels tired, sluggish, lethargic. Patient is drinking plenty of water. Urinating normally, at least 3-4x/day. Denies diarrhea. Denies fevers. Endorses cough. ROS: 
Per HPI Allergies Allergen Reactions  Celexa [Citalopram] Other (comments)  Diflucan [Fluconazole] Nausea and Vomiting  Flagyl [Metronidazole] Hives  Lisinopril Other (comments) Low BP  
 Metformin Other (comments)  Neosporin [Benzalkonium Chloride] Other (comments)  Pcn [Penicillins] Hives  Sulfa (Sulfonamide Antibiotics) Hives Current Outpatient Prescriptions:  
  oxyCODONE-acetaminophen (PERCOCET 7.5) 7.5-325 mg per tablet, Take 1 Tab by mouth every eight (8) hours as needed for Pain. Max Daily Amount: 3 Tabs., Disp: 20 Tab, Rfl: 0 
  pantoprazole (PROTONIX) 40 mg tablet, Take 1 Tab by mouth daily. , Disp: 90 Tab, Rfl: 3 
  ondansetron (ZOFRAN ODT) 8 mg disintegrating tablet, Take 1 Tab by mouth every eight (8) hours as needed for Nausea., Disp: 30 Tab, Rfl: 2   cyclobenzaprine (FLEXERIL) 10 mg tablet, Take 1 Tab by mouth nightly as needed for Muscle Spasm(s). , Disp: 30 Tab, Rfl: 1 
  fluticasone (FLONASE SENSIMIST) 27.5 mcg/actuation nasal spray, 2 Sprays by Nasal route daily. , Disp: 5.9 g, Rfl: 2 
  diclofenac (VOLTAREN) 1 % gel, Apply  to affected area four (4) times daily. , Disp: 30 g, Rfl: 2 
  lidocaine (LIDODERM) 5 %, 1 Patch by TransDERmal route every twelve (12) hours. , Disp: 1 Each, Rfl: 2 
  rosuvastatin (CRESTOR) 20 mg tablet, Take 1 Tab by mouth nightly., Disp: 90 Tab, Rfl: 3 
  clopidogrel (PLAVIX) 75 mg tab, Take 1 Tab by mouth daily. , Disp: 90 Tab, Rfl: 3 
  TENS Units scott, Apply to right lower back for 30 min daily as needed for low back pain, Disp: 1 Device, Rfl: 2 
  miscellaneous medical supply (BLOOD PRESSURE CUFF) misc, Check blood pressure daily, Disp: 1 Each, Rfl: 0 
  Blood Pressure Monitor (BLOOD PRESSURE KIT) kit, Check blood pressure daily, Disp: 1 Kit, Rfl: 0 
  fluticasone (FLOVENT DISKUS) 50 mcg/actuation inhaler, Take  by inhalation. , Disp: , Rfl:  
  amitriptyline (ELAVIL) 10 mg tablet, 1 p.o. nightly for 2 weeks then 2 p.o. nightly for 2 weeks then 3 p.o. nightly.   Indications: NEUROPATHIC PAIN, Disp: 90 Tab, Rfl: 5 
  methocarbamol (ROBAXIN) 500 mg tablet, 1 p.o. every 6 hours as needed spasms  Indications: Muscle Spasm, Disp: 120 Tab, Rfl: 3 
  meclizine (ANTIVERT) 25 mg tablet, Take 1 Tab by mouth three (3) times daily as needed. , Disp: 60 Tab, Rfl: 1 
  metFORMIN (GLUCOPHAGE) 1,000 mg tablet, Take 1,000 mg by mouth two (2) times daily (with meals). , Disp: , Rfl:  
  diclofenac EC (VOLTAREN) 75 mg EC tablet, Take 1 Tab by mouth two (2) times daily as needed. , Disp: 30 Tab, Rfl: 2 
  fenofibrate (LOFIBRA) 160 mg tablet, Take 160 mg by mouth., Disp: , Rfl:  
  gabapentin (NEURONTIN) 300 mg capsule, Take 3 Tabs by mouth., Disp: , Rfl:  
  ONETOUCH ULTRA TEST strip, , Disp: , Rfl:  
  DULoxetine (CYMBALTA) 60 mg capsule, Take 60 mg by mouth daily. , Disp: , Rfl:  
  LANTUS 100 unit/mL injection, , Disp: , Rfl:  
  HUMALOG 100 unit/mL injection, , Disp: , Rfl:  
  Insulin Syringe-Needle U-100 1 mL 31 x 5/16\" syrg, , Disp: , Rfl: 3 Past Medical History:  
Diagnosis Date  Diabetes ([address]  Fibula fracture   
 right  Hip fracture ([address]  Myocardial infarct ([address] Past Surgical History:  
Procedure Laterality Date  HX CERVICAL LAMINECTOMY  HX CORONARY ARTERY BYPASS GRAFT February 2017  HX GYN    
 HX ORTHOPAEDIC    
 cervical fusion History Smoking Status  Former Smoker Smokeless Tobacco  
 Never Used Social History Social History  Marital status:  Spouse name: N/A  
 Number of children: N/A  
 Years of education: N/A Social History Main Topics  Smoking status: Former Smoker  Smokeless tobacco: Never Used  Alcohol use No  
 Drug use: Yes Special: Cocaine  Sexual activity: No  
 
Other Topics Concern  None Social History Narrative Family History Problem Relation Age of Onset  Cancer Mother  Diabetes Mother  Heart Disease Mother  Heart Disease Father  Diabetes Father Objective:  
 
Visit Vitals  /62 (BP 1 Location: Left arm, BP Patient Position: Sitting)  Pulse 81  Temp 98 °F (36.7 °C)  Resp 12  Ht 5' 5\" (1.651 m)  Wt 250 lb 3.2 oz (113.5 kg)  SpO2 95%  BMI 41.64 kg/m2 Gen: alert, oriented, appears fatigued. Head: normocephalic, atraumatic Eyes:sclera clear, conjunctiva clear Oral: moist mucus membranes, no oral lesions, no pharyngeal exudate, no pharyngeal erythema Resp: Normal work of breathing, lungs CTAB, no w/r/r 
CV: S1, S2 normal.  No murmurs, rubs, or gallops. Abd:  Normal bowel sounds. Soft, slightly tender to palpation generally. Skin: no rash; slightly diaphoretic and clammy to touch Extremities: no edema Results for orders placed or performed in visit on 10/04/18 AMB POC JENARO INFLUENZA A/B TEST Result Value Ref Range VALID INTERNAL CONTROL POC Yes Influenza A Ag POC Negative Negative Pos/Neg Influenza B Ag POC Negative Negative Pos/Neg Labs reviewed from 10/2; lipase, lactic acid, wbc, hgb, cr, na, k, cl all WNL. AST 43, ALT 47, Calcium 10.5. CT chest 9/19:  
IMPRESSION 1. No evidence of acute pulmonary thromboembolism. 2. Heterogeneous groundglass appearance of the lung parenchyma which may be due 
to small airways disease. No major airspace disease. 3. Hepatic steatosis. Assessment/ Plan:  
Diagnoses and all orders for this visit: 1. Body aches -     AMB POC JENARO INFLUENZA A/B TEST 
-     CK 
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 
-     oxyCODONE-acetaminophen (PERCOCET 7.5) 7.5-325 mg per tablet; Take 1 Tab by mouth every eight (8) hours as needed for Pain. Max Daily Amount: 3 Tabs. 3. Sternum pain 
-     oxyCODONE-acetaminophen (PERCOCET 7.5) 7.5-325 mg per tablet; Take 1 Tab by mouth every eight (8) hours as needed for Pain. Max Daily Amount: 3 Tabs. Patient with ongoing symptoms of body aches, nausea, fatigue. Flu negative today. Check CK/TSH as add on to labs from 10/2/18. Consider gastroparesis in ddx (possible need for gastric emptying study?). Not sure how the body aches tie into this, but she is on Crestor so will check CK. Hold metformin and see if she feels better not taking this. She is already taking large amounts of Lantus. Advised her it is best if she can take her insulin and meals regularly to avoid bouncing up and down with her sugars. She will try to be more regular about this. Follow up Mondayt/Tuesday of next week for re-evaluation. Face-to-face time greater than 25 minutes.  Greater than 50% of today's visit devoted to counseling and coordination of care. Verbal and written instructions (see AVS) provided.  Patient expresses understanding of diagnosis and treatment plan. Steve Padilla.  Paulo Morel MD

## 2018-10-04 NOTE — TELEPHONE ENCOUNTER
Patient called back. Patient agreed to see Dr. Paulo Morel.  Patient scheduled to see Dr. Paulo Morel at 10:15 am.

## 2018-10-05 ENCOUNTER — TELEPHONE (OUTPATIENT)
Dept: FAMILY MEDICINE CLINIC | Age: 53
End: 2018-10-05

## 2018-10-05 ENCOUNTER — APPOINTMENT (OUTPATIENT)
Dept: GENERAL RADIOLOGY | Age: 53
End: 2018-10-05
Attending: EMERGENCY MEDICINE
Payer: MEDICAID

## 2018-10-05 ENCOUNTER — APPOINTMENT (OUTPATIENT)
Dept: CT IMAGING | Age: 53
End: 2018-10-05
Attending: EMERGENCY MEDICINE
Payer: MEDICAID

## 2018-10-05 ENCOUNTER — HOSPITAL ENCOUNTER (EMERGENCY)
Age: 53
Discharge: HOME | End: 2018-10-05
Attending: EMERGENCY MEDICINE
Payer: MEDICAID

## 2018-10-05 VITALS
DIASTOLIC BLOOD PRESSURE: 96 MMHG | OXYGEN SATURATION: 96 % | BODY MASS INDEX: 41.29 KG/M2 | RESPIRATION RATE: 18 BRPM | WEIGHT: 247.8 LBS | SYSTOLIC BLOOD PRESSURE: 158 MMHG | HEART RATE: 98 BPM | TEMPERATURE: 98.1 F | HEIGHT: 65 IN

## 2018-10-05 DIAGNOSIS — E11.65 TYPE 2 DIABETES MELLITUS WITH HYPERGLYCEMIA, UNSPECIFIED WHETHER LONG TERM INSULIN USE (HCC): ICD-10-CM

## 2018-10-05 DIAGNOSIS — E86.0 DEHYDRATION: Primary | ICD-10-CM

## 2018-10-05 DIAGNOSIS — K76.0 HEPATIC STEATOSIS: ICD-10-CM

## 2018-10-05 DIAGNOSIS — R81 GLUCOSURIA: ICD-10-CM

## 2018-10-05 LAB
ALBUMIN SERPL-MCNC: 4.1 G/DL (ref 3.5–5)
ALBUMIN/GLOB SERPL: 1.1 {RATIO} (ref 1.1–2.2)
ALP SERPL-CCNC: 55 U/L (ref 45–117)
ALT SERPL-CCNC: 56 U/L (ref 12–78)
ANION GAP SERPL CALC-SCNC: 5 MMOL/L (ref 5–15)
APPEARANCE UR: CLEAR
AST SERPL-CCNC: 40 U/L (ref 15–37)
BACTERIA URNS QL MICRO: NEGATIVE /HPF
BILIRUB SERPL-MCNC: 0.4 MG/DL (ref 0.2–1)
BILIRUB UR QL: NEGATIVE
BUN SERPL-MCNC: 17 MG/DL (ref 6–20)
BUN/CREAT SERPL: 16 (ref 12–20)
CALCIUM SERPL-MCNC: 8.9 MG/DL (ref 8.5–10.1)
CHLORIDE SERPL-SCNC: 98 MMOL/L (ref 97–108)
CK SERPL-CCNC: 236 U/L (ref 26–192)
CK SERPL-CCNC: 338 U/L (ref 24–173)
CO2 SERPL-SCNC: 32 MMOL/L (ref 21–32)
COLOR UR: ABNORMAL
CREAT SERPL-MCNC: 1.08 MG/DL (ref 0.55–1.02)
EPITH CASTS URNS QL MICRO: ABNORMAL /LPF
ERYTHROCYTE [DISTWIDTH] IN BLOOD BY AUTOMATED COUNT: 13 % (ref 11.5–14.5)
GLOBULIN SER CALC-MCNC: 3.7 G/DL (ref 2–4)
GLUCOSE SERPL-MCNC: 377 MG/DL (ref 65–100)
GLUCOSE UR STRIP.AUTO-MCNC: >1000 MG/DL
HCT VFR BLD AUTO: 43.3 % (ref 35–47)
HGB BLD-MCNC: 14.3 G/DL (ref 11.5–16)
HGB UR QL STRIP: NEGATIVE
HYALINE CASTS URNS QL MICRO: ABNORMAL /LPF (ref 0–5)
KETONES UR QL STRIP.AUTO: NEGATIVE MG/DL
LEUKOCYTE ESTERASE UR QL STRIP.AUTO: NEGATIVE
MAGNESIUM SERPL-MCNC: 2 MG/DL (ref 1.6–2.4)
MCH RBC QN AUTO: 30.2 PG (ref 26–34)
MCHC RBC AUTO-ENTMCNC: 33 G/DL (ref 30–36.5)
MCV RBC AUTO: 91.4 FL (ref 80–99)
NITRITE UR QL STRIP.AUTO: NEGATIVE
NRBC # BLD: 0 K/UL (ref 0–0.01)
NRBC BLD-RTO: 0 PER 100 WBC
PH UR STRIP: 7.5 [PH] (ref 5–8)
PLATELET # BLD AUTO: 326 K/UL (ref 150–400)
PMV BLD AUTO: 9.8 FL (ref 8.9–12.9)
POTASSIUM SERPL-SCNC: 5.1 MMOL/L (ref 3.5–5.1)
PROT SERPL-MCNC: 7.8 G/DL (ref 6.4–8.2)
PROT UR STRIP-MCNC: NEGATIVE MG/DL
RBC # BLD AUTO: 4.74 M/UL (ref 3.8–5.2)
RBC #/AREA URNS HPF: ABNORMAL /HPF (ref 0–5)
SODIUM SERPL-SCNC: 135 MMOL/L (ref 136–145)
SP GR UR REFRACTOMETRY: 1.03 (ref 1–1.03)
SPECIMEN STATUS REPORT, ROLRST: NORMAL
TSH SERPL DL<=0.005 MIU/L-ACNC: 1.51 UIU/ML (ref 0.45–4.5)
UA: UC IF INDICATED,UAUC: ABNORMAL
UROBILINOGEN UR QL STRIP.AUTO: 0.2 EU/DL (ref 0.2–1)
WBC # BLD AUTO: 7.2 K/UL (ref 3.6–11)
WBC URNS QL MICRO: ABNORMAL /HPF (ref 0–4)

## 2018-10-05 PROCEDURE — 36415 COLL VENOUS BLD VENIPUNCTURE: CPT | Performed by: EMERGENCY MEDICINE

## 2018-10-05 PROCEDURE — 74011636320 HC RX REV CODE- 636/320: Performed by: EMERGENCY MEDICINE

## 2018-10-05 PROCEDURE — 96375 TX/PRO/DX INJ NEW DRUG ADDON: CPT

## 2018-10-05 PROCEDURE — 74011250636 HC RX REV CODE- 250/636: Performed by: EMERGENCY MEDICINE

## 2018-10-05 PROCEDURE — 74177 CT ABD & PELVIS W/CONTRAST: CPT

## 2018-10-05 PROCEDURE — 71046 X-RAY EXAM CHEST 2 VIEWS: CPT

## 2018-10-05 PROCEDURE — 85027 COMPLETE CBC AUTOMATED: CPT | Performed by: EMERGENCY MEDICINE

## 2018-10-05 PROCEDURE — 80053 COMPREHEN METABOLIC PANEL: CPT | Performed by: EMERGENCY MEDICINE

## 2018-10-05 PROCEDURE — 96374 THER/PROPH/DIAG INJ IV PUSH: CPT

## 2018-10-05 PROCEDURE — 96361 HYDRATE IV INFUSION ADD-ON: CPT

## 2018-10-05 PROCEDURE — 81001 URINALYSIS AUTO W/SCOPE: CPT | Performed by: EMERGENCY MEDICINE

## 2018-10-05 PROCEDURE — 83735 ASSAY OF MAGNESIUM: CPT | Performed by: EMERGENCY MEDICINE

## 2018-10-05 PROCEDURE — 99284 EMERGENCY DEPT VISIT MOD MDM: CPT

## 2018-10-05 PROCEDURE — 82550 ASSAY OF CK (CPK): CPT | Performed by: EMERGENCY MEDICINE

## 2018-10-05 RX ORDER — SODIUM CHLORIDE 0.9 % (FLUSH) 0.9 %
10 SYRINGE (ML) INJECTION
Status: COMPLETED | OUTPATIENT
Start: 2018-10-05 | End: 2018-10-05

## 2018-10-05 RX ORDER — ONDANSETRON 2 MG/ML
4 INJECTION INTRAMUSCULAR; INTRAVENOUS
Status: COMPLETED | OUTPATIENT
Start: 2018-10-05 | End: 2018-10-05

## 2018-10-05 RX ORDER — KETOROLAC TROMETHAMINE 30 MG/ML
15 INJECTION, SOLUTION INTRAMUSCULAR; INTRAVENOUS
Status: COMPLETED | OUTPATIENT
Start: 2018-10-05 | End: 2018-10-05

## 2018-10-05 RX ORDER — SODIUM CHLORIDE 9 MG/ML
50 INJECTION, SOLUTION INTRAVENOUS
Status: COMPLETED | OUTPATIENT
Start: 2018-10-05 | End: 2018-10-05

## 2018-10-05 RX ORDER — GUAIFENESIN 100 MG/5ML
325 LIQUID (ML) ORAL DAILY
COMMUNITY
End: 2021-01-26 | Stop reason: SDUPTHER

## 2018-10-05 RX ORDER — DICYCLOMINE HYDROCHLORIDE 10 MG/1
10 CAPSULE ORAL 4 TIMES DAILY
Qty: 20 CAP | Refills: 0 | Status: SHIPPED | OUTPATIENT
Start: 2018-10-05 | End: 2018-10-10

## 2018-10-05 RX ADMIN — SODIUM CHLORIDE 50 ML/HR: 900 INJECTION, SOLUTION INTRAVENOUS at 16:36

## 2018-10-05 RX ADMIN — DIATRIZOATE MEGLUMINE AND DIATRIZOATE SODIUM 30 ML: 660; 100 LIQUID ORAL; RECTAL at 15:14

## 2018-10-05 RX ADMIN — Medication 10 ML: at 16:57

## 2018-10-05 RX ADMIN — KETOROLAC TROMETHAMINE 15 MG: 30 INJECTION, SOLUTION INTRAMUSCULAR at 15:13

## 2018-10-05 RX ADMIN — ONDANSETRON 4 MG: 2 INJECTION, SOLUTION INTRAMUSCULAR; INTRAVENOUS at 15:13

## 2018-10-05 RX ADMIN — IOPAMIDOL 100 ML: 755 INJECTION, SOLUTION INTRAVENOUS at 16:57

## 2018-10-05 RX ADMIN — SODIUM CHLORIDE 1000 ML: 900 INJECTION, SOLUTION INTRAVENOUS at 15:14

## 2018-10-05 RX ADMIN — SODIUM CHLORIDE 1000 ML: 900 INJECTION, SOLUTION INTRAVENOUS at 16:03

## 2018-10-05 NOTE — ED NOTES
Called xray at this time to have xray done. Tech states \"I will do it right now. \" 
Patient updated. Patient completed gastroview

## 2018-10-05 NOTE — ED PROVIDER NOTES
EMERGENCY DEPARTMENT HISTORY AND PHYSICAL EXAM 
 
 
Date: 10/5/2018 Patient Name: Bre Rebolledo History of Presenting Illness Chief Complaint Patient presents with  Fatigue History Provided By: Patient HPI: [facility], 48 y.o. female with PMHx significant for hip fracture, DM, fibula fracture, MI, cervical fusion, CABG, presents ambulatory to the ED with cc of gradual worsening generalized weakness and fatigue, onset ~3 weeks pta. Pt reports associated sxs of gradual worsening chest pain and nausea. Pt reports ~3 weeks ago, found 2 spots on bilateral lungs and was diagnosed with pneumonia. Pt reports having a follow up appointment after diagnoses of pneumonia, as was prescribed abx, cough medication, and Flexeril. Pt reports taking medication for ~7 days, when sxs began to worsen. Pt reports future sternum surgery this month, and believes this is the reason for current chest pain. Pt reports on 09/29/2018, fatigue began to worsen, and was exacerbated by walking up stairs. Pt reports drinking fluids constantly, but still c/o severe dry mucous membranes. Pt reports nausea exacerbated by laying down on back. Pt reports eating minimal food on 10/04/2018. Pt reports past cigarette smoker, and quit in 2012. Pt reports taking Pepto Bismol pta for nausea. Pt denies drinking caffeinated beverages pta. There are no other complaints, changes, or physical findings at this time. PCP: Jacob Burnham MD 
 
Current Outpatient Prescriptions Medication Sig Dispense Refill  aspirin 81 mg chewable tablet Take 81 mg by mouth daily.  dicyclomine (BENTYL) 10 mg capsule Take 1 Cap by mouth four (4) times daily for 5 days. 20 Cap 0  
 oxyCODONE-acetaminophen (PERCOCET 7.5) 7.5-325 mg per tablet Take 1 Tab by mouth every eight (8) hours as needed for Pain. Max Daily Amount: 3 Tabs. 20 Tab 0  
 pantoprazole (PROTONIX) 40 mg tablet Take 1 Tab by mouth daily.  90 Tab 3  
  ondansetron (ZOFRAN ODT) 8 mg disintegrating tablet Take 1 Tab by mouth every eight (8) hours as needed for Nausea. 30 Tab 2  cyclobenzaprine (FLEXERIL) 10 mg tablet Take 1 Tab by mouth nightly as needed for Muscle Spasm(s). 30 Tab 1  
 fluticasone (FLONASE SENSIMIST) 27.5 mcg/actuation nasal spray 2 Sprays by Nasal route daily. 5.9 g 2  
 diclofenac (VOLTAREN) 1 % gel Apply  to affected area four (4) times daily. 30 g 2  
 lidocaine (LIDODERM) 5 % 1 Patch by TransDERmal route every twelve (12) hours. 1 Each 2  
 clopidogrel (PLAVIX) 75 mg tab Take 1 Tab by mouth daily. 90 Tab 3  
 TENS Units scott Apply to right lower back for 30 min daily as needed for low back pain 1 Device 2  
 miscellaneous medical supply (BLOOD PRESSURE CUFF) misc Check blood pressure daily 1 Each 0  Blood Pressure Monitor (BLOOD PRESSURE KIT) kit Check blood pressure daily 1 Kit 0  
 fluticasone (FLOVENT DISKUS) 50 mcg/actuation inhaler Take  by inhalation.  methocarbamol (ROBAXIN) 500 mg tablet 1 p.o. every 6 hours as needed spasms  Indications: Muscle Spasm 120 Tab 3  
 diclofenac EC (VOLTAREN) 75 mg EC tablet Take 1 Tab by mouth two (2) times daily as needed. 30 Tab 2  
 fenofibrate (LOFIBRA) 160 mg tablet Take 160 mg by mouth.  gabapentin (NEURONTIN) 300 mg capsule Take 3 Tabs by mouth.  ONETOUCH ULTRA TEST strip  DULoxetine (CYMBALTA) 60 mg capsule Take 60 mg by mouth daily.  LANTUS 100 unit/mL injection  HUMALOG 100 unit/mL injection  Insulin Syringe-Needle U-100 1 mL 31 x 5/16\" syrg   3  
 rosuvastatin (CRESTOR) 20 mg tablet Take 1 Tab by mouth nightly. 90 Tab 3  
 amitriptyline (ELAVIL) 10 mg tablet 1 p.o. nightly for 2 weeks then 2 p.o. nightly for 2 weeks then 3 p.o. nightly. Indications: NEUROPATHIC PAIN 90 Tab 5  
 meclizine (ANTIVERT) 25 mg tablet Take 1 Tab by mouth three (3) times daily as needed.  60 Tab 1  
  metFORMIN (GLUCOPHAGE) 1,000 mg tablet Take 1,000 mg by mouth two (2) times daily (with meals). Past History Past Medical History: 
Past Medical History:  
Diagnosis Date  Diabetes ([facility] [address]  Fibula fracture   
 right  Hip fracture ([facility] [address]  Myocardial infarct ([facility] [address] Past Surgical History: 
Past Surgical History:  
Procedure Laterality Date  HX CERVICAL LAMINECTOMY  HX CORONARY ARTERY BYPASS GRAFT February 2017  HX GYN    
 HX ORTHOPAEDIC    
 cervical fusion Family History: 
Family History Problem Relation Age of Onset  Cancer Mother  Diabetes Mother  Heart Disease Mother  Heart Disease Father  Diabetes Father Social History: 
Social History Substance Use Topics  Smoking status: Former Smoker  Smokeless tobacco: Never Used  Alcohol use No  
 
 
Allergies: Allergies Allergen Reactions  Celexa [Citalopram] Other (comments)  Diflucan [Fluconazole] Nausea and Vomiting  Flagyl [Metronidazole] Hives  Lisinopril Other (comments) Low BP  
 Metformin Other (comments)  Neosporin [Benz[place] Chloride] Other (comments)  Pcn [Penicillins] Hives  Sulfa (Sulfonamide Antibiotics) Hives Review of Systems Review of Systems Constitutional: Positive for fatigue. Negative for chills and fever. Respiratory: Negative for cough and shortness of breath. Cardiovascular: Positive for chest pain. Gastrointestinal: Positive for nausea. Negative for constipation, diarrhea and vomiting. Neurological: Negative for weakness and numbness. All other systems reviewed and are negative. Physical Exam  
Physical Exam  
Constitutional: She is oriented to person, place, and time. She appears well-developed and well-nourished. Overweight female; able to communicate full paragraphs without pause; slightly anxious HENT:  
Head: Normocephalic and atraumatic. Very dry mucous membranes; cracks on tongue Eyes: Conjunctivae and EOM are normal.  
Neck: Normal range of motion. Neck supple. Cardiovascular: Normal rate and regular rhythm. Pulmonary/Chest: Effort normal and breath sounds normal. No respiratory distress. Lungs clear; tachypnea; no labored breathing Abdominal: Soft. She exhibits no distension. There is no tenderness. Diffuse tenderness over abdomen Musculoskeletal: Normal range of motion. Neurological: She is alert and oriented to person, place, and time. Skin: Skin is warm and dry. Psychiatric: She has a normal mood and affect. Nursing note and vitals reviewed. Diagnostic Study Results Labs - Recent Results (from the past 12 hour(s)) CK Collection Time: 10/05/18  2:52 PM  
Result Value Ref Range  (H) 26 - 192 U/L  
CBC W/O DIFF Collection Time: 10/05/18  2:52 PM  
Result Value Ref Range WBC 7.2 3.6 - 11.0 K/uL  
 RBC 4.74 3.80 - 5.20 M/uL  
 HGB 14.3 11.5 - 16.0 g/dL HCT 43.3 35.0 - 47.0 % MCV 91.4 80.0 - 99.0 FL  
 MCH 30.2 26.0 - 34.0 PG  
 MCHC 33.0 30.0 - 36.5 g/dL  
 RDW 13.0 11.5 - 14.5 % PLATELET 011 898 - 249 K/uL MPV 9.8 8.9 - 12.9 FL  
 NRBC 0.0 0  WBC ABSOLUTE NRBC 0.00 0.00 - 0.01 K/uL MAGNESIUM Collection Time: 10/05/18  2:52 PM  
Result Value Ref Range Magnesium 2.0 1.6 - 2.4 mg/dL METABOLIC PANEL, COMPREHENSIVE Collection Time: 10/05/18  2:52 PM  
Result Value Ref Range Sodium 135 (L) 136 - 145 mmol/L Potassium 5.1 3.5 - 5.1 mmol/L Chloride 98 97 - 108 mmol/L  
 CO2 32 21 - 32 mmol/L Anion gap 5 5 - 15 mmol/L Glucose 377 (H) 65 - 100 mg/dL BUN 17 6 - 20 MG/DL Creatinine 1.08 (H) 0.55 - 1.02 MG/DL  
 BUN/Creatinine ratio 16 12 - 20 GFR est AA >60 >60 ml/min/1.73m2 GFR est non-AA 53 (L) >60 ml/min/1.73m2 Calcium 8.9 8.5 - 10.1 MG/DL Bilirubin, total 0.4 0.2 - 1.0 MG/DL  
 ALT (SGPT) 56 12 - 78 U/L  
 AST (SGOT) 40 (H) 15 - 37 U/L Alk.  phosphatase 55 45 - 117 U/L  
 Protein, total 7.8 6.4 - 8.2 g/dL Albumin 4.1 3.5 - 5.0 g/dL Globulin 3.7 2.0 - 4.0 g/dL A-G Ratio 1.1 1.1 - 2.2 URINALYSIS W/ REFLEX CULTURE Collection Time: 10/05/18  3:00 PM  
Result Value Ref Range Color YELLOW/STRAW Appearance CLEAR CLEAR Specific gravity 1.030 1.003 - 1.030    
 pH (UA) 7.5 5.0 - 8.0 Protein NEGATIVE  NEG mg/dL Glucose >1000 (A) NEG mg/dL Ketone NEGATIVE  NEG mg/dL Bilirubin NEGATIVE  NEG Blood NEGATIVE  NEG Urobilinogen 0.2 0.2 - 1.0 EU/dL Nitrites NEGATIVE  NEG Leukocyte Esterase NEGATIVE  NEG    
 UA:UC IF INDICATED CULTURE NOT INDICATED BY UA RESULT CNI    
 WBC 0-4 0 - 4 /hpf  
 RBC 0-5 0 - 5 /hpf Epithelial cells MODERATE (A) FEW /lpf Bacteria NEGATIVE  NEG /hpf Hyaline cast 2-5 0 - 5 /lpf Radiologic Studies -  
CT ABD PELV W CONT Final Result XR CHEST PA LAT Final Result CT Results  (Last 48 hours) 10/05/18 1705  CT ABD PELV W CONT Final result Impression:  IMPRESSION:   
1. No acute findings in the abdomen or pelvis. 2. Hepatic steatosis. Nader Counter Narrative:  EXAMINATION:  CT ABD PELV W CONT INDICATION:  Abdominal pain nausea, three-week history of gradually worsening  
generalized weakness and fatigue, chest pain and nausea, diffuse abdominal  
tenderness on examination COMPARISON: CT chest of 9/19/2018, ultrasound abdomen of 7/20/2015. CONTRAST: 100 mL of Isovue-370. TECHNIQUE:   
Multislice helical CT was performed from the diaphragm to the symphysis pubis  
during uneventful rapid bolus intravenous contrast administration. Oral contrast  
was administered. Contiguous 5 mm axial images were reconstructed and lung and  
soft tissue windows were generated. Coronal and sagittal reformations were  
generated. CT dose reduction was achieved through use of a standardized protocol  
tailored for this examination and automatic exposure control for dose modulation. FINDINGS:  
LOWER CHEST: The visualized portions of the lung bases are clear. ABDOMEN:  
Liver: Diffuse hypodensity, consistent with hepatic steatosis. No focal lesions. Gallbladder and bile ducts: No gallstones. No biliary dilatation. Spleen: No abnormality. Pancreas: No abnormality. Adrenal glands: No abnormality. Kidneys: No abnormality. BOWEL AND MESENTERY: Stomach appears unremarkable. No small bowel  
abnormalities. No significant colonic abnormalities. No mesenteric mass or  
adenopathy. Appendix normal.  
   
PERITONEUM: No ascites or free intraperitoneal air. RETROPERITONEUM: Aorta  tapers without aneurysm. No retroperitoneal adenopathy  
or mass. No pelvic mass or adenopathy. PELVIS:  
Reproductive organs:  No significant abnormalities. Bladder: No abnormality. BONES AND SOFT TISSUES: No significant bony abnormalities. CXR Results  (Last 48 hours) 10/05/18 1620  XR CHEST PA LAT Final result Impression:  IMPRESSION:   
Clear lungs. Narrative:  PA AND LATERAL CHEST RADIOGRAPHS: 10/5/2018 4:20 PM  
   
INDICATION: Dyspnea, cough, chest pain. COMPARISON: 9/19/2018, 8/8/2014. TECHNIQUE: Frontal and lateral radiographs of the chest.  
   
FINDINGS:  
The lungs are clear. The central airways are patent. The heart size is normal.  
No pneumothorax or pleural effusion. Post CABG and lower cervical spinal fusion. Medical Decision Making I am the first provider for this patient. I reviewed the vital signs, available nursing notes, past medical history, past surgical history, family history and social history. Vital Signs-Reviewed the patient's vital signs. Patient Vitals for the past 12 hrs: 
 Temp Pulse Resp BP SpO2  
10/05/18 1756 98.1 °F (36.7 °C) 98 18 (!) 158/96 96 % 10/05/18 1600 - 84 - 142/83 95 % 10/05/18 1522 - - - 153/44 95 % 10/05/18 1255 97.9 °F (36.6 °C) (!) 102 18 148/77 100 % Pulse Oximetry Analysis - 100% on RA Cardiac Monitor:  
Rate: 102 bpm 
Rhythm: Sinus Tachycardia 1255 Records Reviewed: Nursing Notes and Old Medical Records Per Chart: CTA showed no PE on 9/2018. Provider Notes (Medical Decision Making):  
Patient presenting with generalized fatigue, SOB, myalgias. DDx: infection, anemia, electrolyte anomoly (hypo or hyperkalemia, hypomagnesemia), hypothyroid, dehydration, hyperglycemia, depression, CA, ACS. Will obtain EKG, UA, labwork for any urgent/emergent pathology. Will also get CT abdomen per PCP given her diffuse tenderness. ED Course:  
Initial assessment performed. The patients presenting problems have been discussed, and they are in agreement with the care plan formulated and outlined with them. I have encouraged them to ask questions as they arise throughout their visit. Progress Note: 
4:35 PM 
Spoke to pt about ANGELINA, glucosuria and dehydration. CK improved from prior. Given 2L NS. Will advise to follow up with PCP about DM management. Progress Note: 
5:30 PM 
Told pt to follow up with endocrinologist as well as PCP. Will send pt home on Bentyl for pain due to nml vitals. Critical Care Time:  
0 minutes Disposition: 
Discharge Note: 
5:55 PM 
The pt is ready for discharge. The pt's signs, symptoms, diagnosis, and discharge instructions have been discussed and pt has conveyed their understanding. The pt is to follow up as recommended or return to ER should their symptoms worsen. Plan has been discussed and pt is in agreement. PLAN: 
1. Discharge Medication List as of 10/5/2018  5:30 PM  
  
CONTINUE these medications which have NOT CHANGED Details  
aspirin 81 mg chewable tablet Take 81 mg by mouth daily. , Historical Med  
  
oxyCODONE-acetaminophen (PERCOCET 7.5) 7.5-325 mg per tablet Take 1 Tab by mouth every eight (8) hours as needed for Pain. Max Daily Amount: 3 Tabs., Print, Disp-20 Tab, R-0  
  
pantoprazole (PROTONIX) 40 mg tablet Take 1 Tab by mouth daily. , Normal, Disp-90 Tab, R-3  
  
ondansetron (ZOFRAN ODT) 8 mg disintegrating tablet Take 1 Tab by mouth every eight (8) hours as needed for Nausea., Normal, Disp-30 Tab, R-2  
  
cyclobenzaprine (FLEXERIL) 10 mg tablet Take 1 Tab by mouth nightly as needed for Muscle Spasm(s). , Normal, Disp-30 Tab, R-1  
  
fluticasone (FLONASE SENSIMIST) 27.5 mcg/actuation nasal spray 2 Sprays by Nasal route daily. , Normal, Disp-5.9 g, R-2  
  
diclofenac (VOLTAREN) 1 % gel Apply  to affected area four (4) times daily. , Normal, Disp-30 g, R-2  
  
lidocaine (LIDODERM) 5 % 1 Patch by TransDERmal route every twelve (12) hours. , Normal, Disp-1 Each, R-2  
  
clopidogrel (PLAVIX) 75 mg tab Take 1 Tab by mouth daily. , Normal, Disp-90 Tab, R-3  
  
TENS Units scott Apply to right lower back for 30 min daily as needed for low back pain, Print, Disp-1 Device, R-2  
  
miscellaneous medical supply (BLOOD PRESSURE CUFF) misc Check blood pressure daily, Print, Disp-1 Each, R-0 Blood Pressure Monitor (BLOOD PRESSURE KIT) kit Check blood pressure daily, Normal, Disp-1 Kit, R-0  
  
fluticasone (FLOVENT DISKUS) 50 mcg/actuation inhaler Take  by inhalation. , Historical Med  
  
methocarbamol (ROBAXIN) 500 mg tablet 1 p.o. every 6 hours as needed spasms  Indications: Muscle Spasm, Normal, Disp-120 Tab, R-3  
  
diclofenac EC (VOLTAREN) 75 mg EC tablet Take 1 Tab by mouth two (2) times daily as needed., Normal, Disp-30 Tab, R-2  
  
fenofibrate (LOFIBRA) 160 mg tablet Take 160 mg by mouth., Historical Med  
  
gabapentin (NEURONTIN) 300 mg capsule Take 3 Tabs by mouth., Historical Med ONETOUCH ULTRA TEST strip Historical Med DULoxetine (CYMBALTA) 60 mg capsule Take 60 mg by mouth daily. , Historical Med LANTUS 100 unit/mL injection Historical Med  
  
 HUMALOG 100 unit/mL injection Historical Med Insulin Syringe-Needle U-100 1 mL 31 x 5/16\" syrg Historical Med, R-3  
  
rosuvastatin (CRESTOR) 20 mg tablet Take 1 Tab by mouth nightly., Normal, Disp-90 Tab, R-3  
  
amitriptyline (ELAVIL) 10 mg tablet 1 p.o. nightly for 2 weeks then 2 p.o. nightly for 2 weeks then 3 p.o. nightly. Indications: NEUROPATHIC PAIN, Normal, Disp-90 Tab, R-5  
  
meclizine (ANTIVERT) 25 mg tablet Take 1 Tab by mouth three (3) times daily as needed., Normal, Disp-60 Tab, R-1  
  
metFORMIN (GLUCOPHAGE) 1,000 mg tablet Take 1,000 mg by mouth two (2) times daily (with meals). , Historical Med 2. Follow-up Information Follow up With Details Comments Contact Info Rashida Leiva MD Schedule an appointment as soon as possible for a visit  [address] [place] [address] [phone] Your Endocrinologist  About diabetes control Return to ED if worse Diagnosis Clinical Impression: 1. Dehydration 2. Glucosuria 3. Type 2 diabetes mellitus with hyperglycemia, unspecified whether long term insulin use ([address] 4. Hepatic steatosis Attestations: This note is prepared by Juliet Donald, acting as Scribe for APRIL Mackey M.D.: The scribe's documentation has been prepared under my direction and personally reviewed by me in its entirety. I confirm that the note above accurately reflects all work, treatment, procedures, and medical decision making performed by me. This note will not be viewable in [address]

## 2018-10-05 NOTE — TELEPHONE ENCOUNTER
Labs reviewed. They look okay. The only thing that seems that might be occlude to her muscle aches is an elevated CK. It is not very elevated so difficult to be sure. See how she feels for a few days off of metformin. If there is no improvement try stopping cholesterol medication for a few days to see if this helps in any way. If her muscle aches are due to her medications then she should improve with a few days of stopping each medication. It is important that she does not stop both her metformin and her Crestor the same time though.   If she were to stop both medications at the same time we will not know which one is the culprit

## 2018-10-05 NOTE — TELEPHONE ENCOUNTER
Ms. Riddhi Catalan notified Dr. Harvey Webb said Labs reviewed. They look okay. The only thing that seems that might be occlude to her muscle aches is an elevated CK. It is not very elevated so difficult to be sure.     See how she feels for a few days off of metformin. If there is no improvement try stopping cholesterol medication for a few days to see if this helps in any way. If her muscle aches are due to her medications then she should improve with a few days of stopping each medication. It is important that she does not stop both her metformin and her Crestor the same time though. If she were to stop both medications at the same time we will not know which one is the culprit. She said she is still nauseated so I talked to Dr. Harvey Webb and he said she needs to give it 4 days off the metformin to see if it is causing the nausea and muscle aches then if she is not better stop the crestor.  She voiced understanding and she will call PRN

## 2018-10-05 NOTE — TELEPHONE ENCOUNTER
Ms. Omayra Campos called back and said she is feeling weaker and weaker and is unable to eat. She said the last thing she had yesterday was a small bowl of beans around lunch and her blood sugar is in the 200's. Recommended she go to the ER for further testing.  She said she would

## 2018-10-05 NOTE — DISCHARGE INSTRUCTIONS
Dehydration: Care Instructions  Your Care Instructions  Dehydration happens when your body loses too much fluid. This might happen when you do not drink enough water or you lose large amounts of fluids from your body because of diarrhea, vomiting, or sweating. Severe dehydration can be life-threatening. Water and minerals called electrolytes help put your body fluids back in balance. Learn the early signs of fluid loss, and drink more fluids to prevent dehydration. Follow-up care is a key part of your treatment and safety. Be sure to make and go to all appointments, and call your doctor if you are having problems. It's also a good idea to know your test results and keep a list of the medicines you take. How can you care for yourself at home? · To prevent dehydration, drink plenty of fluids, enough so that your urine is light yellow or clear like water. Choose water and other caffeine-free clear liquids until you feel better. If you have kidney, heart, or liver disease and have to limit fluids, talk with your doctor before you increase the amount of fluids you drink. · If you do not feel like eating or drinking, try taking small sips of water, sports drinks, or other rehydration drinks. · Get plenty of rest.  To prevent dehydration  · Add more fluids to your diet and daily routine, unless your doctor has told you not to. · During hot weather, drink more fluids. Drink even more fluids if you exercise a lot. Stay away from drinks with alcohol or caffeine. · Watch for the symptoms of dehydration. These include:  ¨ A dry, sticky mouth. ¨ Dark yellow urine, and not much of it. ¨ Dry and sunken eyes. ¨ Feeling very tired. · Learn what problems can lead to dehydration. These include:  ¨ Diarrhea, fever, and vomiting. ¨ Any illness with a fever, such as pneumonia or the flu. ¨ Activities that cause heavy sweating, such as endurance races and heavy outdoor work in hot or humid weather.   ¨ Alcohol or drug abuse or withdrawal.  ¨ Certain medicines, such as cold and allergy pills (antihistamines), diet pills (diuretics), and laxatives. ¨ Certain diseases, such as diabetes, cancer, and heart or kidney disease. When should you call for help? Call 911 anytime you think you may need emergency care. For example, call if:    · You passed out (lost consciousness).    Call your doctor now or seek immediate medical care if:    · You are confused and cannot think clearly.     · You are dizzy or lightheaded, or you feel like you may faint.     · You have signs of needing more fluids. You have sunken eyes and a dry mouth, and you pass only a little dark urine.     · You cannot keep fluids down.    Watch closely for changes in your health, and be sure to contact your doctor if:    · You are not making tears.     · Your skin is very dry and sags slowly back into place after you pinch it.     · Your mouth and eyes are very dry. Where can you learn more? Go to http://cintia-venancio.info/. Enter L679 in the search box to learn more about \"Dehydration: Care Instructions. \"  Current as of: November 20, 2017  Content Version: 11.8  © 9787-3340 [employer]. Care instructions adapted under license by [employer] (which disclaims liability or warranty for this information). If you have questions about a medical condition or this instruction, always ask your healthcare professional. [place] [address] any warranty or liability for your use of this information.

## 2018-10-11 ENCOUNTER — HOSPITAL ENCOUNTER (OUTPATIENT)
Dept: DIABETES SERVICES | Age: 53
Discharge: HOME | End: 2018-10-11
Payer: MEDICAID

## 2018-10-11 DIAGNOSIS — E11.9 TYPE 2 DIABETES MELLITUS WITHOUT COMPLICATION, UNSPECIFIED WHETHER LONG TERM INSULIN USE (HCC): ICD-10-CM

## 2018-10-11 PROCEDURE — G0109 DIAB MANAGE TRN IND/GROUP: HCPCS

## 2018-10-12 NOTE — DIABETES MGMT
[facility] 10/11/18 Thank you for your kind referral. Your patient,  Jose Stovall has completed his/her personal initial comprehensive education plan. The education plan included the following topics: Disease Process, Nutrition, exercise, Blood Glucose Monitoring, Mediation, Acute and Chronic Complications, Behavioral and Lifestyle Change and Healthy Coping. Data from visit: 
Weight:   7/30/2018 250 lbs; 10/11/2018 251.6 lbs HgbA1c: 7/30/2018 10.1%,   10/11/2018 9.4 % Increased risk for diabetes: 5.7-6.4 %,  Diabetes: >6.4% Glycemic control for adults with diabetes: <7%   Elderly or multiple medical conditions: <8% Your patient continued the following goal(s) from their first class:  
Goal 1: check blood sugar 2 hours after eating Education Learning Outcomes for All Education Topics(see above): Demonstrated Competency Your patient has been invited to attend Class 4 six months from now to continue learning about their diabetes. This class will focus on heart healthy eating. Pt is going to attend individual education on November 6th to address her specific dietary needs associated with her gastroparesis and her DM control. If you have any questions,please do not hesitate to call the [facility] at [phone] Sincerely, Larisa Wise RD ,[address] [facility] Phone: [phone] Fax : [phone]

## 2018-10-15 RX ORDER — INSULIN LISPRO 100 [IU]/ML
INJECTION, SOLUTION INTRAVENOUS; SUBCUTANEOUS
Qty: 3 VIAL | Refills: 3 | Status: SHIPPED | OUTPATIENT
Start: 2018-10-15 | End: 2019-02-15 | Stop reason: SDUPTHER
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
## 2018-10-15 NOTE — TELEPHONE ENCOUNTER
Chief Complaint   Patient presents with    Medication Refill     Last refill:   2 years ago (12/14/2015)        HUMALOG 100 unit/mL injection       Sig: N/A       Dispense: Not specified     Start: 11/4/2015     By: Historical Provider       Patient states she is taking Humalog.  30 units per meal.

## 2018-10-17 ENCOUNTER — TELEPHONE (OUTPATIENT)
Dept: FAMILY MEDICINE CLINIC | Age: 53
End: 2018-10-17

## 2018-10-17 DIAGNOSIS — G89.29 CHRONIC LOW BACK PAIN, UNSPECIFIED BACK PAIN LATERALITY, WITH SCIATICA PRESENCE UNSPECIFIED: Primary | ICD-10-CM

## 2018-10-17 DIAGNOSIS — M54.5 CHRONIC LOW BACK PAIN, UNSPECIFIED BACK PAIN LATERALITY, WITH SCIATICA PRESENCE UNSPECIFIED: Primary | ICD-10-CM

## 2018-10-17 DIAGNOSIS — R26.9 GAIT ABNORMALITY: ICD-10-CM
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
## 2018-10-22 NOTE — TELEPHONE ENCOUNTER
Requested Prescriptions     Pending Prescriptions Disp Refills    gabapentin (NEURONTIN) 300 mg capsule       Sig: Take  by mouth.      Last Office Visit:  10.02.2018  Future Office Visit:  None scheduled  Last Filled:  Unknown  Changes Made to Medication on Last Visit:  Last received from [facility]

## 2018-10-23 RX ORDER — GABAPENTIN 300 MG/1
300 CAPSULE ORAL
Qty: 90 CAP | Refills: 1 | Status: SHIPPED | OUTPATIENT
Start: 2018-10-23 | End: 2019-02-05 | Stop reason: SDUPTHER

## 2018-10-24 ENCOUNTER — TELEPHONE (OUTPATIENT)
Dept: FAMILY MEDICINE CLINIC | Age: 53
End: 2018-10-24

## 2018-10-24 NOTE — TELEPHONE ENCOUNTER
Patient notified of Rx refill request approved. States that she had received notification from [facility] that Rx is available for .

## 2018-10-26 ENCOUNTER — OFFICE VISIT (OUTPATIENT)
Dept: FAMILY MEDICINE CLINIC | Age: 53
End: 2018-10-26

## 2018-10-26 VITALS
BODY MASS INDEX: 42.1 KG/M2 | SYSTOLIC BLOOD PRESSURE: 121 MMHG | WEIGHT: 253 LBS | TEMPERATURE: 98.6 F | DIASTOLIC BLOOD PRESSURE: 69 MMHG

## 2018-10-26 DIAGNOSIS — M20.60 DEFORMITY OF TOE, UNSPECIFIED LATERALITY: ICD-10-CM

## 2018-10-26 DIAGNOSIS — E11.21 TYPE 2 DIABETES WITH NEPHROPATHY (HCC): Primary | ICD-10-CM

## 2018-10-26 DIAGNOSIS — Z95.1 S/P CABG X 3: ICD-10-CM

## 2018-10-26 DIAGNOSIS — I10 ESSENTIAL HYPERTENSION: ICD-10-CM

## 2018-10-26 DIAGNOSIS — E78.1 HYPERTRIGLYCERIDEMIA: ICD-10-CM

## 2018-10-26 DIAGNOSIS — R07.89 STERNUM PAIN: ICD-10-CM

## 2018-10-26 DIAGNOSIS — M96.89 NONUNION OF STERNUM AFTER STERNOTOMY: ICD-10-CM

## 2018-10-26 RX ORDER — OXYCODONE AND ACETAMINOPHEN 7.5; 325 MG/1; MG/1
1 TABLET ORAL
Qty: 20 TAB | Refills: 0 | Status: SHIPPED | OUTPATIENT
Start: 2018-10-26 | End: 2018-11-30 | Stop reason: SDUPTHER

## 2018-10-26 NOTE — PROGRESS NOTES
[place]  Debora Stovall is a 48 y.o. female who presents with a concern about swelling. Has been happening for the last few days. Only seems to be happening in the evening after she has been on her feet all day. The ankles and feet swell and are painful. Has not tried any compression. Discomfort resolves with elevation. She has seen podiatry, has had her toenails examined and podiatrist commented that she may need to have her toenails removed. This is on the back burner while her A1c is high. She has been walking around and tennis shoes and the thickened toenails are rubbing on the top of her tennis shoes and causing her pain. She is taking her gabapentin differently. Recently started on amitriptyline 10 mg and is ramping up the dose as suggested by her neurologist.  She is currently taking 1200 mg gabapentin in the morning and 600 mg in the evening. She is not taking a midday dose. This is actually a dose reduction since starting the amitriptyline because she does not feel that she needs as much        PMHx:  Past Medical History:   Diagnosis Date    [facility] [address]     Fibula fracture     right    Hip fracture ([facility] [address]     Myocardial infarct ([facility] [address]        Meds:   Current Outpatient Medications   Medication Sig Dispense Refill    OTHER Please fit for diabetic foot wear. E11.21, M20.60 1 Each 0    oxyCODONE-acetaminophen (PERCOCET 7.5) 7.5-325 mg per tablet Take 1 Tab by mouth every eight (8) hours as needed for Pain. Max Daily Amount: 3 Tabs. 20 Tab 0    gabapentin (NEURONTIN) 300 mg capsule Take 1 Cap by mouth three (3) times daily as needed. 90 Cap 1    insulin lispro (HUMALOG U-100 INSULIN) 100 unit/mL injection 30 units with each meal. 3 Vial 3    aspirin 81 mg chewable tablet Take 81 mg by mouth daily.  pantoprazole (PROTONIX) 40 mg tablet Take 1 Tab by mouth daily. 90 Tab 3    ondansetron (ZOFRAN ODT) 8 mg disintegrating tablet Take 1 Tab by mouth every eight (8) hours as needed for Nausea. 30 Tab 2    cyclobenzaprine (FLEXERIL) 10 mg tablet Take 1 Tab by mouth nightly as needed for Muscle Spasm(s). 30 Tab 1    fluticasone (FLONASE SENSIMIST) 27.5 mcg/actuation nasal spray 2 Sprays by Nasal route daily. 5.9 g 2    diclofenac (VOLTAREN) 1 % gel Apply  to affected area four (4) times daily. 30 g 2    lidocaine (LIDODERM) 5 % 1 Patch by TransDERmal route every twelve (12) hours. 1 Each 2    clopidogrel (PLAVIX) 75 mg tab Take 1 Tab by mouth daily. 90 Tab 3    miscellaneous medical supply (BLOOD PRESSURE CUFF) misc Check blood pressure daily 1 Each 0    Blood Pressure Monitor (BLOOD PRESSURE KIT) kit Check blood pressure daily 1 Kit 0    amitriptyline (ELAVIL) 10 mg tablet 1 p.o. nightly for 2 weeks then 2 p.o. nightly for 2 weeks then 3 p.o. nightly. Indications: NEUROPATHIC PAIN 90 Tab 5    methocarbamol (ROBAXIN) 500 mg tablet 1 p.o. every 6 hours as needed spasms  Indications: Muscle Spasm 120 Tab 3    metFORMIN (GLUCOPHAGE) 1,000 mg tablet Take 1,000 mg by mouth two (2) times daily (with meals).  diclofenac EC (VOLTAREN) 75 mg EC tablet Take 1 Tab by mouth two (2) times daily as needed. 30 Tab 2    fenofibrate (LOFIBRA) 160 mg tablet Take 160 mg by mouth.  ONETOUCH ULTRA TEST strip       DULoxetine (CYMBALTA) 60 mg capsule Take 60 mg by mouth daily.  LANTUS 100 unit/mL injection       Insulin Syringe-Needle U-100 1 mL 31 x 5/16\" syrg   3    Walker misc Please dispense a rolling walkerIt M54.5, G89.29, R26.9 1 Each 0    rosuvastatin (CRESTOR) 20 mg tablet Take 1 Tab by mouth nightly. 90 Tab 3    TENS Units scott Apply to right lower back for 30 min daily as needed for low back pain 1 Device 2    meclizine (ANTIVERT) 25 mg tablet Take 1 Tab by mouth three (3) times daily as needed. 60 Tab 1       Allergies:    Allergies   Allergen Reactions    Celexa [Citalopram] Other (comments)    Diflucan [Fluconazole] Nausea and Vomiting    Flagyl [Metronidazole] Hives    Lisinopril Other (comments)     Low BP    Metformin Other (comments)    Neosporin [Benzalkonium Chloride] Other (comments)    Pcn [Penicillins] Hives    Sulfa (Sulfonamide Antibiotics) Hives       Smoker:  Social History     Tobacco Use   Smoking Status Former Smoker   Smokeless Tobacco Never Used       ETOH:   Social History     Substance and Sexual Activity   Alcohol Use No       FH:   Family History   Problem Relation Age of Onset    Cancer Mother     Diabetes Mother     Heart Disease Mother     Heart Disease Father     Diabetes Father        ROS:   As listed in HPI. In addition:  Constitutional:   No headache, fever, fatigue, weight loss or weight gain      Cardiac:    No chest pain      Resp:   No cough or shortness of breath      Neuro   No loss of consciousness, dizziness, seizures      Physical Exam:  Blood pressure 121/69, temperature 98.6 °F (37 °C), temperature source Oral, weight 253 lb (114.8 kg). GEN: No apparent distress. Alert and oriented and responds to all questions appropriately. NEUROLOGIC:  No focal neurologic deficits. Strength and sensation grossly intact. Coordination and gait grossly intact. EXT: Well perfused. No edema. SKIN: No obvious rashes. Assessment and Plan     Diabetes  A1c 9.4% diabetic education    Toenail deformity. particularly  the  left great toenail is the potential to create a foot wound and if it is allowed to rub on her shoe. She would benefit from diabetic foot wear with a more open toe box to prevent this injury. We will provide her with a prescription for this. She is getting muscle aches, she stopped taking Crestor and the muscle aches got better. Will hold off on resuming statin for now. Plans to have surgical evaluation for her sternotomy after Thanksgiving provided her A1c comes down    Mild lower extremity edema, not currently present. Present at the end of the day.   Mild compression stockings      ICD-10-CM ICD-9-CM 1. Type 2 diabetes with nephropathy (HCC) E11.21 250.40 OTHER     583.81    2. S/P CABG x 3 Z95.1 V45.81    3. Hypertriglyceridemia E78.1 272.1    4. Essential hypertension I10 401.9    5. Deformity of toe, unspecified laterality M20.60 735.9    6. 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 Sternum pain R07.89 786.50 oxyCODONE-acetaminophen (PERCOCET 7.5) 7.5-325 mg per tablet       AVS given.  Pt expressed understanding of instructions

## 2018-11-13 ENCOUNTER — TELEPHONE (OUTPATIENT)
Dept: FAMILY MEDICINE CLINIC | Age: 53
End: 2018-11-13

## 2018-11-13 RX ORDER — DICYCLOMINE HYDROCHLORIDE 10 MG/1
10 CAPSULE ORAL
Qty: 90 CAP | Refills: 3 | Status: SHIPPED | OUTPATIENT
Start: 2018-11-13 | End: 2019-12-18

## 2018-11-13 NOTE — TELEPHONE ENCOUNTER
Called Ms. Stovall and she said the the provider in the hospital gave it to her for issues with not digesting her food well, and it has helped

## 2018-11-14 ENCOUNTER — OFFICE VISIT (OUTPATIENT)
Dept: NEUROLOGY | Age: 53
End: 2018-11-14

## 2018-11-14 VITALS
OXYGEN SATURATION: 98 % | HEIGHT: 65 IN | DIASTOLIC BLOOD PRESSURE: 80 MMHG | BODY MASS INDEX: 42.15 KG/M2 | WEIGHT: 253 LBS | SYSTOLIC BLOOD PRESSURE: 120 MMHG | HEART RATE: 74 BPM

## 2018-11-14 DIAGNOSIS — E11.42 DIABETIC POLYNEUROPATHY ASSOCIATED WITH TYPE 2 DIABETES MELLITUS (HCC): ICD-10-CM

## 2018-11-14 DIAGNOSIS — G56.22 ULNAR NEUROPATHY AT ELBOW OF LEFT UPPER EXTREMITY: Primary | ICD-10-CM

## 2018-11-14 NOTE — PATIENT INSTRUCTIONS
Office Policies 
o Phone calls/patient messages: Please allow up to 24 hours for someone in the office to contact you about your call or message. Be mindful your provider may be out of the office or your message may require further review. We encourage you to use [employer] for your messages as this is a faster, more efficient way to communicate with our officeo Medication Refills: 
Prescription medications require up to 48 business hours to process. We encourage you to use [employer] for your refills. For controlled medications: Please allow up to 72 business hours to process. Certain medications may require you to  a written prescription at our office. NO narcotic/controlled medications will be prescribed after 4pm Monday through Friday or on weekendso Form/Paperwork Completion: 
Please note there is a $25 fee for all paperwork completed by our providers. We ask that you allow 7-14 business days. Pre-payment is due prior to picking up/faxing the completed form. You may also download your forms to [employer] to have your doctor print off. 
o Test Results: In order for a patient to obtain test results, an appointment must be made with the physician to review the results. Test results cannot be discussed over the phone. A Healthy Lifestyle: Care Instructions Your Care Instructions A healthy lifestyle can help you feel good, stay at a healthy weight, and have plenty of energy for both work and play. A healthy lifestyle is something you can share with your whole family. A healthy lifestyle also can lower your risk for serious health problems, such as high blood pressure, heart disease, and diabetes. You can follow a few steps listed below to improve your health and the health of your family. Follow-up care is a key part of your treatment and safety.  Be sure to make and go to all appointments, and call your doctor if you are having problems. It's also a good idea to know your test results and keep a list of the medicines you take. How can you care for yourself at home? · Do not eat too much sugar, fat, or fast foods. You can still have dessert and treats now and then. The goal is moderation. · Start small to improve your eating habits. Pay attention to portion sizes, drink less juice and soda pop, and eat more fruits and vegetables. ? Eat a healthy amount of food. A 3-ounce serving of meat, for example, is about the size of a deck of cards. Fill the rest of your plate with vegetables and whole grains. ? Limit the amount of soda and sports drinks you have every day. Drink more water when you are thirsty. ? Eat at least 5 servings of fruits and vegetables every day. It may seem like a lot, but it is not hard to reach this goal. A serving or helping is 1 piece of fruit, 1 cup of vegetables, or 2 cups of leafy, raw vegetables. Have an apple or some carrot sticks as an afternoon snack instead of a candy bar. Try to have fruits and/or vegetables at every meal. 
· Make exercise part of your daily routine. You may want to start with simple activities, such as walking, bicycling, or slow swimming. Try to be active 30 to 60 minutes every day. You do not need to do all 30 to 60 minutes all at once. For example, you can exercise 3 times a day for 10 or 20 minutes. Moderate exercise is safe for most people, but it is always a good idea to talk to your doctor before starting an exercise program. 
· Keep moving. [place] Putt the lawn, work in the garden, or [employer]. Take the stairs instead of the elevator at work. · If you smoke, quit. People who smoke have an increased risk for heart attack, stroke, cancer, and other lung illnesses. Quitting is hard, but there are ways to boost your chance of quitting tobacco for good. ? Use nicotine gum, patches, or lozenges. ? Ask your doctor about stop-smoking programs and medicines. ? Keep trying. In addition to reducing your risk of diseases in the future, you will notice some benefits soon after you stop using tobacco. If you have shortness of breath or asthma symptoms, they will likely get better within a few weeks after you quit. · Limit how much alcohol you drink. Moderate amounts of alcohol (up to 2 drinks a day for men, 1 drink a day for women) are okay. But drinking too much can lead to liver problems, high blood pressure, and other health problems. Family health If you have a family, there are many things you can do together to improve your health. · Eat meals together as a family as often as possible. · Eat healthy foods. This includes fruits, vegetables, lean meats and dairy, and whole grains. · Include your family in your fitness plan. Most people think of activities such as jogging or tennis as the way to fitness, but there are many ways you and your family can be more active. Anything that makes you breathe hard and gets your heart pumping is exercise. Here are some tips: 
? Walk to do errands or to take your child to school or the bus. 
? Go for a family bike ride after dinner instead of watching TV. Where can you learn more? Go to http://cintia-venancio.info/. Enter S492 in the search box to learn more about \"A Healthy Lifestyle: Care Instructions. \" Current as of: December 7, 2017 Content Version: 11.8 © 9128-6931 [employer]. Care instructions adapted under license by [employer] (which disclaims liability or warranty for this information). If you have questions about a medical condition or this instruction, always ask your healthcare professional. [place] [address] any warranty or liability for your use of this information.

## 2018-11-14 NOTE — PROGRESS NOTES
HISTORY OF PRESENT ILLNESS Jimbo Stovall is a 48 y.o. female. 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 She apparently developed spasticity number of years ago and was found to have cervical spinal stenosis. She underwent surgery for cervical spinal stenosis and walked better but now has problems with jerking tremors in her upper and lower extremities. She also has trouble with severe numbness in her upper extremities, she says she has had significant numbness in her feet and lower legs for a long time. She does have diabetes. She had an EMG by Dr. Elia Schafer generalized neuropathy consistent with diabetes, she also had bilateral carpal tunnel syndrome left worse than right and a left ulnar neuropathy as well. She had the ulnar neuropathy repaired and it helped and she has less pain but she now has an occasional electrical shooting pain that goes from the ulnar notch at the elbow up into the neck. She is also having some pain radiating from her right toe up into the lower shin area. It is shooting abut rapidly dissipates. She is now taking gabapentin 600 mg 3 times a day. New Patient The history is provided by the patient. This is a chronic problem. Follow-up Review of Systems Constitutional:  
     Review of systems is positive for anxiety, chest pain, falls, fatigue, muscle pain, muscle weakness, neuropathy, shortness of breath, snoring and difficulty swallowing. The review of systems done all others negative Current Outpatient Medications on File Prior to Visit Medication Sig Dispense Refill  dicyclomine (BENTYL) 10 mg capsule Take 1 Cap by mouth three (3) times daily as needed. (Patient taking differently: Take 10 mg by mouth four (4) times daily.) 90 Cap 3  cyclobenzaprine (FLEXERIL) 10 mg tablet Take 1 Tab by mouth nightly as needed for Muscle Spasm(s). 30 Tab 1  
 diclofenac (VOLTAREN) 1 % gel Apply  to affected area four (4) times daily.  30 g 2  
  clopidogrel (PLAVIX) 75 mg tab Take 1 Tab by mouth daily. 90 Tab 3  Blood Pressure Monitor (BLOOD PRESSURE KIT) kit Check blood pressure daily 1 Kit 0  
 amitriptyline (ELAVIL) 10 mg tablet 1 p.o. nightly for 2 weeks then 2 p.o. nightly for 2 weeks then 3 p.o. nightly. Indications: NEUROPATHIC PAIN 90 Tab 5  
 diclofenac EC (VOLTAREN) 75 mg EC tablet Take 1 Tab by mouth two (2) times daily as needed. 30 Tab 2  
 OTHER Please fit for diabetic foot wear. E11.21, M20.60 1 Each 0  
 oxyCODONE-acetaminophen (PERCOCET 7.5) 7.5-325 mg per tablet Take 1 Tab by mouth every eight (8) hours as needed for Pain. Max Daily Amount: 3 Tabs. 20 Tab 0  
 gabapentin (NEURONTIN) 300 mg capsule Take 1 Cap by mouth three (3) times daily as needed. [address] Please dispense a rolling walkerIt M54.5, G89.29, R26.9 1 Each 0  
 insulin lispro (HUMALOG U-100 INSULIN) 100 unit/mL injection 30 units with each meal. 3 Vial 3  
 aspirin 81 mg chewable tablet Take 81 mg by mouth daily.  pantoprazole (PROTONIX) 40 mg tablet Take 1 Tab by mouth daily. 90 Tab 3  
 ondansetron (ZOFRAN ODT) 8 mg disintegrating tablet Take 1 Tab by mouth every eight (8) hours as needed for Nausea. 30 Tab 2  
 fluticasone (FLONASE SENSIMIST) 27.5 mcg/actuation nasal spray 2 Sprays by Nasal route daily. 5.9 g 2  
 lidocaine (LIDODERM) 5 % 1 Patch by TransDERmal route every twelve (12) hours. 1 Each 2  
 rosuvastatin (CRESTOR) 20 mg tablet Take 1 Tab by mouth nightly. 90 Tab 3  
 TENS Units scott Apply to right lower back for 30 min daily as needed for low back pain 1 Device 2  
 miscellaneous medical supply (BLOOD PRESSURE CUFF) misc Check blood pressure daily 1 Each 0  
 methocarbamol (ROBAXIN) 500 mg tablet 1 p.o. every 6 hours as needed spasms  Indications: Muscle Spasm 120 Tab 3  
 meclizine (ANTIVERT) 25 mg tablet Take 1 Tab by mouth three (3) times daily as needed.  60 Tab 1  
  metFORMIN (GLUCOPHAGE) 1,000 mg tablet Take 1,000 mg by mouth two (2) times daily (with meals).  fenofibrate (LOFIBRA) 160 mg tablet Take 160 mg by mouth.  ONETOUCH ULTRA TEST strip  DULoxetine (CYMBALTA) 60 mg capsule Take 60 mg by mouth daily.  LANTUS 100 unit/mL injection  Insulin Syringe-Needle U-100 1 mL 31 x 5/16\" syrg   3 No current facility-administered medications on file prior to visit. Past Medical History:  
Diagnosis Date  Diabetes ([facility] [address]  Fibula fracture   
 right  Hip fracture ([facility] [address]  Myocardial infarct ([facility] [address] Family History Problem Relation Age of Onset  Cancer Mother  Diabetes Mother  Heart Disease Mother  Heart Disease Father  Diabetes Father Social History Tobacco Use  Smoking status: Former Smoker  Smokeless tobacco: Never Used Substance Use Topics  Alcohol use: No  
 Drug use: Yes Types: Cocaine /80   Pulse 74   Ht 5' 5\" (1.651 m)   Wt 114.8 kg (253 lb)   SpO2 98%   BMI 42.10 kg/m² Physical Exam  
Constitutional: She appears well-developed and well-nourished. HENT:  
Mouth/Throat: Oropharyngeal exudate present. Musculoskeletal: Normal range of motion. She exhibits edema and tenderness. She exhibits no deformity. She is markedly tender in her feet. Neurological:  
Speech, language and mentation are she now has an ulnar scar on the left as well at the elbow normal cranial nerves II through XII are normal carotid pulses are brisk and no bruits she has hypersensitive upper extremities especially the hands and fingers. She has undergone carpal tunnel repair on both hands. She does have weakness of her APBs bilaterally. Skin: Skin is warm and dry. Psychiatric: She has a normal mood and affect. Her behavior is normal. Judgment and thought content normal.  
 
 
ASSESSMENT and PLAN 
GAIT DISORDER  She has a mild spastic gait disorder undoubtedly due to the cervical myelopathy she suffered, there is nothing to be done for that. UE HAND NUMBNESS 
 her hand numbness is better since she had her carpal tunnel syndrome repaired bilaterally. She is still complaining of pain at the left cubital tunnel, I will repeat her left upper extremity EMG just focusing on that left ulnar nerve at the cubital tunnel. I will see her back on the day of that test to discuss results with her. LE NUMBNESS This lady has diabetic neuropathy in her feet to a significant degree. This combined with her cervical myelopathy is leading to considerable problems and use of her legs. All I can do is attempt to treat the pain. I will add an EMG of one lower extremity to the to upper extremities to delineate the degree of neuropathy involved SPASMS/TREMORS I suspect these spasms/tremors that she is having are a result of her myelopathy. This is a secondary problem and not a major one at this time so we will move it to the back burner. The methocarbamol was no assistance, DIABETES MELLITUS Stroke Risk, stable, managed by primary care HYPERLIPIDEMIA Stroke risk, stable, managed by primary care This note will not be viewable in [address]

## 2018-11-21 ENCOUNTER — TELEPHONE (OUTPATIENT)
Dept: FAMILY MEDICINE CLINIC | Age: 53
End: 2018-11-21

## 2018-11-29 ENCOUNTER — TELEPHONE (OUTPATIENT)
Dept: FAMILY MEDICINE CLINIC | Age: 53
End: 2018-11-29

## 2018-11-29 NOTE — TELEPHONE ENCOUNTER
Per Dr. Starlet Osgood, Neuro note: patient taking Gabapentin 600 mg three times daily. Pharmacist given info.

## 2018-11-30 ENCOUNTER — OFFICE VISIT (OUTPATIENT)
Dept: FAMILY MEDICINE CLINIC | Age: 53
End: 2018-11-30

## 2018-11-30 VITALS
WEIGHT: 250.4 LBS | HEART RATE: 79 BPM | DIASTOLIC BLOOD PRESSURE: 78 MMHG | RESPIRATION RATE: 16 BRPM | TEMPERATURE: 98.4 F | BODY MASS INDEX: 41.67 KG/M2 | SYSTOLIC BLOOD PRESSURE: 133 MMHG | OXYGEN SATURATION: 94 %

## 2018-11-30 DIAGNOSIS — R11.0 NAUSEA: ICD-10-CM

## 2018-11-30 DIAGNOSIS — E11.21 TYPE 2 DIABETES WITH NEPHROPATHY (HCC): ICD-10-CM

## 2018-11-30 DIAGNOSIS — M96.89 NONUNION OF STERNUM AFTER STERNOTOMY: ICD-10-CM

## 2018-11-30 DIAGNOSIS — R07.89 STERNUM PAIN: ICD-10-CM

## 2018-11-30 DIAGNOSIS — K52.9 GASTROENTERITIS: Primary | ICD-10-CM

## 2018-11-30 RX ORDER — EZETIMIBE 10 MG/1
TABLET ORAL
COMMUNITY
End: 2019-11-19

## 2018-11-30 RX ORDER — OXYCODONE AND ACETAMINOPHEN 7.5; 325 MG/1; MG/1
1 TABLET ORAL
Qty: 20 TAB | Refills: 0 | Status: SHIPPED | OUTPATIENT
Start: 2018-11-30 | End: 2019-02-08 | Stop reason: SDUPTHER

## 2018-11-30 RX ORDER — ONDANSETRON 8 MG/1
8 TABLET, ORALLY DISINTEGRATING ORAL
Qty: 30 TAB | Refills: 2 | Status: SHIPPED | OUTPATIENT
Start: 2018-11-30 | End: 2021-01-26 | Stop reason: SDUPTHER

## 2018-11-30 NOTE — PROGRESS NOTES
PARIS Stovall is a 48 y.o. female who presents with nausea vomiting would happen in the evening of Thanksgiving. Sister came down with similar illness an hour later. Bug lasted for about 24 hours involving nausea vomiting and diarrhea. Since then she has not been eating normally and she is drinking far less fluids than she typically does. Feels achy. Dark urine. Fatigued. Sister has recovered more or less fully PMHx: 
Past Medical History:  
Diagnosis Date  Diabetes ([facility] [address]  Fibula fracture   
 right  Hip fracture ([facility] [address]  Myocardial infarct ([facility] [address] Meds:  
Current Outpatient Medications Medication Sig Dispense Refill  ezetimibe (ZETIA) 10 mg tablet Take  by mouth.  oxyCODONE-acetaminophen (PERCOCET 7.5) 7.5-325 mg per tablet Take 1 Tab by mouth every eight (8) hours as needed for Pain. Max Daily Amount: 3 Tabs. 20 Tab 0  
 ondansetron (ZOFRAN ODT) 8 mg disintegrating tablet Take 1 Tab by mouth every eight (8) hours as needed for Nausea. 30 Tab 2  
 dicyclomine (BENTYL) 10 mg capsule Take 1 Cap by mouth three (3) times daily as needed. (Patient taking differently: Take 10 mg by mouth four (4) times daily.) 90 Cap 3  
 OTHER Please fit for diabetic foot wear. E11.21, M20.60 1 Each 0  
 gabapentin (NEURONTIN) 300 mg capsule Take 1 Cap by mouth three (3) times daily as needed. [address] Please dispense a rolling walkerIt M54.5, G89.29, R26.9 1 Each 0  
 insulin lispro (HUMALOG U-100 INSULIN) 100 unit/mL injection 30 units with each meal. 3 Vial 3  
 aspirin 81 mg chewable tablet Take 81 mg by mouth daily.  pantoprazole (PROTONIX) 40 mg tablet Take 1 Tab by mouth daily. 90 Tab 3  
 fluticasone (FLONASE SENSIMIST) 27.5 mcg/actuation nasal spray 2 Sprays by Nasal route daily. 5.9 g 2  
 diclofenac (VOLTAREN) 1 % gel Apply  to affected area four (4) times daily.  30 g 2  
 lidocaine (LIDODERM) 5 % 1 Patch by TransDERmal route every twelve (12) hours. 1 Each 2  
 rosuvastatin (CRESTOR) 20 mg tablet Take 1 Tab by mouth nightly. 90 Tab 3  clopidogrel (PLAVIX) 75 mg tab Take 1 Tab by mouth daily. 90 Tab 3  
 miscellaneous medical supply (BLOOD PRESSURE CUFF) misc Check blood pressure daily 1 Each 0  Blood Pressure Monitor (BLOOD PRESSURE KIT) kit Check blood pressure daily 1 Kit 0  
 amitriptyline (ELAVIL) 10 mg tablet 1 p.o. nightly for 2 weeks then 2 p.o. nightly for 2 weeks then 3 p.o. nightly. Indications: NEUROPATHIC PAIN 90 Tab 5  
 methocarbamol (ROBAXIN) 500 mg tablet 1 p.o. every 6 hours as needed spasms  Indications: Muscle Spasm 120 Tab 3  
 metFORMIN (GLUCOPHAGE) 1,000 mg tablet Take 1,000 mg by mouth two (2) times daily (with meals).  fenofibrate (LOFIBRA) 160 mg tablet Take 160 mg by mouth.  ONETOUCH ULTRA TEST strip  DULoxetine (CYMBALTA) 60 mg capsule Take 60 mg by mouth daily.  LANTUS 100 unit/mL injection  Insulin Syringe-Needle U-100 1 mL 31 x 5/16\" syrg   3  cyclobenzaprine (FLEXERIL) 10 mg tablet Take 1 Tab by mouth nightly as needed for Muscle Spasm(s). 30 Tab 1  TENS Units scott Apply to right lower back for 30 min daily as needed for low back pain 1 Device 2  
 meclizine (ANTIVERT) 25 mg tablet Take 1 Tab by mouth three (3) times daily as needed. 60 Tab 1  
 diclofenac EC (VOLTAREN) 75 mg EC tablet Take 1 Tab by mouth two (2) times daily as needed. 30 Tab 2 Allergies: Allergies Allergen Reactions  Celexa [Citalopram] Other (comments)  Diflucan [Fluconazole] Nausea and Vomiting  Flagyl [Metronidazole] Hives  Lisinopril Other (comments) Low BP  
 Metformin Other (comments)  Neosporin [Benzalkonium Chloride] Other (comments)  Pcn [Penicillins] Hives  Sulfa (Sulfonamide Antibiotics) Hives Smoker: 
Social History Tobacco Use Smoking Status Former Smoker Smokeless Tobacco Never Used ETOH:  
Social History Substance and Sexual Activity Alcohol Use No  
 
 
FH:  
Family History Problem Relation Age of Onset  Cancer Mother  Diabetes Mother  Heart Disease Mother  Heart Disease Father  Diabetes Father ROS:  
As listed in HPI. In addition: 
Constitutional:   No headache, fever, fatigue, weight loss or weight gain Cardiac:    No chest pain Resp:   No cough or shortness of breath Neuro   No loss of consciousness, dizziness, seizures Physical Exam: 
Blood pressure 133/78, pulse 79, temperature 98.4 °F (36.9 °C), temperature source Oral, resp. rate 16, weight 250 lb 6.4 oz (113.6 kg), SpO2 94 %. GEN: No apparent distress. Alert and oriented and responds to all questions appropriately. NEUROLOGIC:  No focal neurologic deficits. Strength and sensation grossly intact. Coordination and gait grossly intact. EXT: Well perfused. No edema. SKIN: No obvious rashes. Lungs clear to auscultation bilaterally CV regular rate and rhythm no HEENT mild nasal congestion with mild postnasal drip, dry mucous membranes Assessment and Plan  
 
 gastroenteritis, viral versus foodborne This is run its course and she has remained feeling under the weather because she is dehydrated. Needs to increase her fluid intake slowly. Do not jugular fluids. Use Zofran as a tool because nausea right now is primarily due to dehydration. resume her normal diet Plans to follow-up with her surgeon to consult about sternotomy sometime the next week or 2 Has required her pain medication a little more often because of her body aches, needs a refill. Previously taking this once or twice per week ICD-10-CM ICD-9-CM 1. Gastroenteritis K52.9 558.9 2. 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 Sternum pain R07.89 786.50 oxyCODONE-acetaminophen (PERCOCET 7.5) 7.5-325 mg per tablet 4. Nausea R11.0 787.02 ondansetron (ZOFRAN ODT) 8 mg disintegrating tablet 5. Type 2 diabetes with nephropathy (HCC) E11.21 250.40   
  583.81   
 
 
AVS given. Pt expressed understanding of instructions

## 2018-12-20 DIAGNOSIS — J06.9 URI WITH COUGH AND CONGESTION: ICD-10-CM

## 2019-01-04 ENCOUNTER — TELEPHONE (OUTPATIENT)
Dept: FAMILY MEDICINE CLINIC | Age: 54
End: 2019-01-04

## 2019-01-04 NOTE — TELEPHONE ENCOUNTER
Pt is calling wanting to speak with nurse in ref to med she has received and wants to makes surer it is ok

## 2019-01-04 NOTE — TELEPHONE ENCOUNTER
Spoke with Ms. Pritesh Jansen. She states she has been in the [facility] for heart issues. She has since been diagnosed with heart failure per patient. They've added lisinopril 5mg , lasix 20mg and coreg 3.125mg. She wants to know if Dr. Paolo Mello thinks she should take the medications that were prescribed to her during her hospital visit. Advised her to come in to be seen for a hospital follow up and to discuss questions with him them .

## 2019-01-07 ENCOUNTER — APPOINTMENT (OUTPATIENT)
Dept: CT IMAGING | Age: 54
End: 2019-01-07
Attending: PHYSICIAN ASSISTANT
Payer: MEDICAID

## 2019-01-07 ENCOUNTER — APPOINTMENT (OUTPATIENT)
Dept: GENERAL RADIOLOGY | Age: 54
End: 2019-01-07
Attending: PHYSICIAN ASSISTANT
Payer: MEDICAID

## 2019-01-07 ENCOUNTER — HOSPITAL ENCOUNTER (EMERGENCY)
Age: 54
Discharge: LEFT BEFORE DISCHARGE | End: 2019-01-07
Attending: EMERGENCY MEDICINE
Payer: MEDICAID

## 2019-01-07 VITALS
OXYGEN SATURATION: 100 % | BODY MASS INDEX: 41.65 KG/M2 | RESPIRATION RATE: 22 BRPM | TEMPERATURE: 97.8 F | HEART RATE: 92 BPM | WEIGHT: 250 LBS | HEIGHT: 65 IN | SYSTOLIC BLOOD PRESSURE: 138 MMHG | DIASTOLIC BLOOD PRESSURE: 71 MMHG

## 2019-01-07 DIAGNOSIS — R52 PAIN: ICD-10-CM

## 2019-01-07 PROCEDURE — 73030 X-RAY EXAM OF SHOULDER: CPT

## 2019-01-07 PROCEDURE — 72052 X-RAY EXAM NECK SPINE 6/>VWS: CPT

## 2019-01-07 PROCEDURE — 72100 X-RAY EXAM L-S SPINE 2/3 VWS: CPT

## 2019-01-07 PROCEDURE — 75810000275 HC EMERGENCY DEPT VISIT NO LEVEL OF CARE

## 2019-01-07 PROCEDURE — 72050 X-RAY EXAM NECK SPINE 4/5VWS: CPT

## 2019-01-07 PROCEDURE — 73502 X-RAY EXAM HIP UNI 2-3 VIEWS: CPT

## 2019-01-07 PROCEDURE — 73060 X-RAY EXAM OF HUMERUS: CPT

## 2019-01-07 PROCEDURE — 70450 CT HEAD/BRAIN W/O DYE: CPT

## 2019-01-07 RX ORDER — ACETAMINOPHEN 325 MG/1
650 TABLET ORAL
Status: DISCONTINUED | OUTPATIENT
Start: 2019-01-07 | End: 2019-01-07 | Stop reason: HOSPADM

## 2019-02-04 RX ORDER — GABAPENTIN 300 MG/1
300 CAPSULE ORAL
Qty: 90 CAP | Refills: 1 | Status: CANCELLED | OUTPATIENT
Start: 2019-02-04

## 2019-02-05 ENCOUNTER — TELEPHONE (OUTPATIENT)
Dept: FAMILY MEDICINE CLINIC | Age: 54
End: 2019-02-05

## 2019-02-05 RX ORDER — GABAPENTIN 300 MG/1
300 CAPSULE ORAL
Qty: 90 CAP | Refills: 1 | Status: SHIPPED | OUTPATIENT
Start: 2019-02-05 | End: 2019-02-08 | Stop reason: SDUPTHER

## 2019-02-05 NOTE — TELEPHONE ENCOUNTER
Set her up to see Terrance Hill NP on Friday 2/8/19.  But she said she needs a refill on gabapentin today   Last office visit  Last refill 11/30/18   10/23/18

## 2019-02-08 ENCOUNTER — OFFICE VISIT (OUTPATIENT)
Dept: FAMILY MEDICINE CLINIC | Age: 54
End: 2019-02-08

## 2019-02-08 VITALS
TEMPERATURE: 98.3 F | WEIGHT: 248 LBS | SYSTOLIC BLOOD PRESSURE: 146 MMHG | RESPIRATION RATE: 14 BRPM | DIASTOLIC BLOOD PRESSURE: 76 MMHG | OXYGEN SATURATION: 95 % | HEIGHT: 65 IN | BODY MASS INDEX: 41.32 KG/M2 | HEART RATE: 95 BPM

## 2019-02-08 DIAGNOSIS — M96.89 NONUNION OF STERNUM AFTER STERNOTOMY: ICD-10-CM

## 2019-02-08 DIAGNOSIS — R07.89 STERNUM PAIN: ICD-10-CM

## 2019-02-08 RX ORDER — GABAPENTIN 300 MG/1
300 CAPSULE ORAL
Qty: 270 CAP | Refills: 1 | Status: SHIPPED | OUTPATIENT
Start: 2019-02-08 | End: 2019-06-05

## 2019-02-08 RX ORDER — OXYCODONE AND ACETAMINOPHEN 7.5; 325 MG/1; MG/1
1 TABLET ORAL
Qty: 20 TAB | Refills: 0 | Status: SHIPPED | OUTPATIENT
Start: 2019-02-08 | End: 2019-02-19 | Stop reason: SDUPTHER

## 2019-02-08 NOTE — ACP (ADVANCE CARE PLANNING)
Discussed importance of advanced medical directives with patient. Patient is capable of making decisions.   Esau Rao NP-C

## 2019-02-08 NOTE — PROGRESS NOTES
Subjective:  
 
Riddhi Stovall is a 48 y.o. female who presents today with the following: Chief Complaint Patient presents with  ED Follow-up 1/3/19 [facility], 1/7/19 ED [facility]  Sleep Problem Patient Active Problem List  
Diagnosis Code  Obesity, morbid ([facility] [address] E66.01  
 S/P CABG x 3 Z95.1  Hypertriglyceridemia E78.1  Essential hypertension I10  Back pain M54.9  Coronary artery disease involving native heart without angina pectoris I25.10  Type 2 diabetes mellitus with complication, with long-term current use of insulin (HCC) E11.8, Z79.4  Type 2 diabetes with nephropathy (HCC) E11.21  
 
 
 
COMPLIANT WITH MEDICATION:  
HTN; Denies chest pain, dyspnea, palpitations, headache and blurred vision. Blood pressure elevated. Pneumonia: completing a course of  Levaquin . Denies fever, chills or chest congestion. Nonproductive cough on infrequent basis. Post op nonunion sternotomy repair . Requests Percocet , gabapentin to go to PT and something for her nerves. depression screening  at risk discussed concerns of loneliness sravan at night afraid of falling at home without assistance. abuse screening addressed denies 
 
learning assessment addressed reviewed nurses notes 
 
fall risk at risk. Discussed safety precautions. HM: addressed declines all interventions ROS: 
Gen: denies fever, chills, fatigue, weight loss, weight gain HEENT:denies blurry vision, nasal congestion, sore throat Resp: denies dypsnea, cough, wheezing CV: denies chest pain radiating to the jaws or arms, palpitations, lower extremity edema Abd: (+) intermittent  nausea, (-)vomiting, diarrhea, constipation Neuro: denies numbness/tingling whike taking gabapentin. Endo: denies polyuria, polydipsia, heat/cold intolerance Heme: no lymphadenopathy Skin: large healed vertical  incision Allergies Allergen Reactions  Celexa [Citalopram] Other (comments)  Diflucan [Fluconazole] Nausea and Vomiting  Flagyl [Metronidazole] Hives  Lisinopril Other (comments) Low BP  
 Metformin Other (comments)  Neosporin [Benzalkonium Chloride] Other (comments)  Pcn [Penicillins] Hives  Sulfa (Sulfonamide Antibiotics) Hives Current Outpatient Medications:  
  oxyCODONE-acetaminophen (PERCOCET 7.5) 7.5-325 mg per tablet, Take 1 Tab by mouth every eight (8) hours as needed for Pain. Max Daily Amount: 3 Tabs., Disp: 20 Tab, Rfl: 0 
  gabapentin (NEURONTIN) 300 mg capsule, Take 1 Cap by mouth three (3) times daily as needed. , Disp: 270 Cap, Rfl: 1 
  fluticasone (FLONASE SENSIMIST) 27.5 mcg/actuation nasal spray, 2 Sprays by Nasal route daily. , Disp: 5.9 g, Rfl: 2 
  ezetimibe (ZETIA) 10 mg tablet, Take  by mouth., Disp: , Rfl:  
  ondansetron (ZOFRAN ODT) 8 mg disintegrating tablet, Take 1 Tab by mouth every eight (8) hours as needed for Nausea., Disp: 30 Tab, Rfl: 2 
  dicyclomine (BENTYL) 10 mg capsule, Take 1 Cap by mouth three (3) times daily as needed. (Patient taking differently: Take 10 mg by mouth four (4) times daily.), Disp: 90 Cap, Rfl: 3 
  OTHER, Please fit for diabetic foot wear. E11.21, M20.60, Disp: 1 Each, Rfl: 0 
  Walker misc, Please dispense a rolling walkerIt M54.5, G89.29, R26.9, Disp: 1 Each, Rfl: 0 
  insulin lispro (HUMALOG U-100 INSULIN) 100 unit/mL injection, 30 units with each meal., Disp: 3 Vial, Rfl: 3 
  aspirin 81 mg chewable tablet, Take 81 mg by mouth daily. , Disp: , Rfl:  
  pantoprazole (PROTONIX) 40 mg tablet, Take 1 Tab by mouth daily. , Disp: 90 Tab, Rfl: 3   cyclobenzaprine (FLEXERIL) 10 mg tablet, Take 1 Tab by mouth nightly as needed for Muscle Spasm(s). , Disp: 30 Tab, Rfl: 1 
  diclofenac (VOLTAREN) 1 % gel, Apply  to affected area four (4) times daily. , Disp: 30 g, Rfl: 2 
  lidocaine (LIDODERM) 5 %, 1 Patch by TransDERmal route every twelve (12) hours. , Disp: 1 Each, Rfl: 2   rosuvastatin (CRESTOR) 20 mg tablet, Take 1 Tab by mouth nightly., Disp: 90 Tab, Rfl: 3 
  clopidogrel (PLAVIX) 75 mg tab, Take 1 Tab by mouth daily. , Disp: 90 Tab, Rfl: 3 
  TENS Units scott, Apply to right lower back for 30 min daily as needed for low back pain, Disp: 1 Device, Rfl: 2 
  miscellaneous medical supply (BLOOD PRESSURE CUFF) misc, Check blood pressure daily, Disp: 1 Each, Rfl: 0 
  Blood Pressure Monitor (BLOOD PRESSURE KIT) kit, Check blood pressure daily, Disp: 1 Kit, Rfl: 0 
  amitriptyline (ELAVIL) 10 mg tablet, 1 p.o. nightly for 2 weeks then 2 p.o. nightly for 2 weeks then 3 p.o. nightly. Indications: NEUROPATHIC PAIN, Disp: 90 Tab, Rfl: 5 
  methocarbamol (ROBAXIN) 500 mg tablet, 1 p.o. every 6 hours as needed spasms  Indications: Muscle Spasm, Disp: 120 Tab, Rfl: 3 
  meclizine (ANTIVERT) 25 mg tablet, Take 1 Tab by mouth three (3) times daily as needed. , Disp: 60 Tab, Rfl: 1 
  metFORMIN (GLUCOPHAGE) 1,000 mg tablet, Take 1,000 mg by mouth two (2) times daily (with meals). , Disp: , Rfl:  
  diclofenac EC (VOLTAREN) 75 mg EC tablet, Take 1 Tab by mouth two (2) times daily as needed. , Disp: 30 Tab, Rfl: 2 
  fenofibrate (LOFIBRA) 160 mg tablet, Take 160 mg by mouth., Disp: , Rfl:  
  ONETOUCH ULTRA TEST strip, , Disp: , Rfl:  
  DULoxetine (CYMBALTA) 60 mg capsule, Take 60 mg by mouth daily. , Disp: , Rfl:  
  LANTUS 100 unit/mL injection, , Disp: , Rfl:  
  Insulin Syringe-Needle U-100 1 mL 31 x 5/16\" syrg, , Disp: , Rfl: 3 Past Medical History:  
Diagnosis Date  Diabetes ([address]  Fibula fracture   
 right  Hip fracture ([address]  Myocardial infarct ([address] Past Surgical History:  
Procedure Laterality Date  HX CERVICAL LAMINECTOMY  HX CORONARY ARTERY BYPASS GRAFT February 2017  HX GYN    
 HX ORTHOPAEDIC    
 cervical fusion Social History Tobacco Use Smoking Status Former Smoker Smokeless Tobacco Never Used Social History Socioeconomic History  Marital status:  Spouse name: Not on file  Number of children: Not on file  Years of education: Not on file  Highest education level: Not on file Tobacco Use  Smoking status: Former Smoker  Smokeless tobacco: Never Used Substance and Sexual Activity  Alcohol use: No  
 Drug use: Yes Types: Cocaine  Sexual activity: No  
 
 
Family History Problem Relation Age of Onset  Cancer Mother  Diabetes Mother  Heart Disease Mother  Heart Disease Father  Diabetes Father Objective:  
 
Visit Vitals /76 (BP 1 Location: Left arm, BP Patient Position: Sitting) Pulse 95 Temp 98.3 °F (36.8 °C) Resp 14 Ht 5' 5\" (1.651 m) Wt 248 lb (112.5 kg) SpO2 95% BMI 41.27 kg/m² Body mass index is 41.27 kg/m². General: Alert and oriented. No acute distress. Well nourished, morbid obesity. HEENT : 
Ears:TMs are normal. Canals are clear. Eyes: pupils equal, round, react to light and accommodation. Extra ocular movements intact. Nose: patent. Mouth and throat is clear. Neck:supple full range of motion no thyromegaly. Trachea midline, No carotid bruits. No significant lymphadenopathy Lungs[de-identified] clear to auscultation without wheezes, rales, or rhonchi. Heart :RRR, S1 & S2 are normal intensity. No murmur; no gallop Abdomen: bowel sounds active. No tenderness, guarding, rebound, due to habitus unable to assess masses, hepatic or spleen enlargement Back: no CVA tenderness. Extremities: without clubbing, cyanosis, or edema Pulses: radial and femoral pulses are normal 
Neuro: HMF intact. Cranial nerves II through XII grossly normal. 
Motor: is 5 over 5 and symmetrical.  
Deep tendon reflexes: +2 equal 
 
Results for orders placed or performed during the hospital encounter of 10/05/18 URINALYSIS W/ REFLEX CULTURE Result Value Ref Range Color YELLOW/STRAW  Appearance CLEAR CLEAR    
 Specific gravity 1.030 1.003 - 1.030    
 pH (UA) 7.5 5.0 - 8.0 Protein NEGATIVE  NEG mg/dL Glucose >1,000 (A) NEG mg/dL Ketone NEGATIVE  NEG mg/dL Bilirubin NEGATIVE  NEG Blood NEGATIVE  NEG Urobilinogen 0.2 0.2 - 1.0 EU/dL Nitrites NEGATIVE  NEG Leukocyte Esterase NEGATIVE  NEG    
 UA:UC IF INDICATED CULTURE NOT INDICATED BY UA RESULT CNI    
 WBC 0-4 0 - 4 /hpf  
 RBC 0-5 0 - 5 /hpf Epithelial cells MODERATE (A) FEW /lpf Bacteria NEGATIVE  NEG /hpf Hyaline cast 2-5 0 - 5 /lpf  
CK Result Value Ref Range  (H) 26 - 192 U/L  
CBC W/O DIFF Result Value Ref Range WBC 7.2 3.6 - 11.0 K/uL  
 RBC 4.74 3.80 - 5.20 M/uL  
 HGB 14.3 11.5 - 16.0 g/dL HCT 43.3 35.0 - 47.0 % MCV 91.4 80.0 - 99.0 FL  
 MCH 30.2 26.0 - 34.0 PG  
 MCHC 33.0 30.0 - 36.5 g/dL  
 RDW 13.0 11.5 - 14.5 % PLATELET 679 149 - 301 K/uL MPV 9.8 8.9 - 12.9 FL  
 NRBC 0.0 0  WBC ABSOLUTE NRBC 0.00 0.00 - 0.01 K/uL MAGNESIUM Result Value Ref Range Magnesium 2.0 1.6 - 2.4 mg/dL METABOLIC PANEL, COMPREHENSIVE Result Value Ref Range Sodium 135 (L) 136 - 145 mmol/L Potassium 5.1 3.5 - 5.1 mmol/L Chloride 98 97 - 108 mmol/L  
 CO2 32 21 - 32 mmol/L Anion gap 5 5 - 15 mmol/L Glucose 377 (H) 65 - 100 mg/dL BUN 17 6 - 20 MG/DL Creatinine 1.08 (H) 0.55 - 1.02 MG/DL  
 BUN/Creatinine ratio 16 12 - 20 GFR est AA >60 >60 ml/min/1.73m2 GFR est non-AA 53 (L) >60 ml/min/1.73m2 Calcium 8.9 8.5 - 10.1 MG/DL Bilirubin, total 0.4 0.2 - 1.0 MG/DL  
 ALT (SGPT) 56 12 - 78 U/L  
 AST (SGOT) 40 (H) 15 - 37 U/L Alk. phosphatase 55 45 - 117 U/L Protein, total 7.8 6.4 - 8.2 g/dL Albumin 4.1 3.5 - 5.0 g/dL Globulin 3.7 2.0 - 4.0 g/dL A-G Ratio 1.1 1.1 - 2.2 No results found for this visit on 02/08/19.  
 
Assessment/ Plan:  
Discussed patient's history and medication requests with Dr. Garrison Flick her PCP . Following orders placed by Dr. Kenna Saravia to facillitate compliance with PT and healing.  reviewed with Dr. Kenna Saravia as part of this encounter. 1. 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 
 
- oxyCODONE-acetaminophen (PERCOCET 7.5) 7.5-325 mg per tablet; Take 1 Tab by mouth every eight (8) hours as needed for Pain. Max Daily Amount: 3 Tabs. Dispense: 20 Tab; Refill: 0 
 
2. Sternum pain 
- oxyCODONE-acetaminophen (PERCOCET 7.5) 7.5-325 mg per tablet; Take 1 Tab by mouth every eight (8) hours as needed for Pain. Max Daily Amount: 3 Tabs. Dispense: 20 Tab; Refill: 0 
 
3. Pneumonia resolving 4. Insomnia: resolved discussed fear of falling at night and measures to stay safe. Orders Placed This Encounter  oxyCODONE-acetaminophen (PERCOCET 7.5) 7.5-325 mg per tablet Sig: Take 1 Tab by mouth every eight (8) hours as needed for Pain. Max Daily Amount: 3 Tabs. Dispense:  20 Tab Refill:  0  
 gabapentin (NEURONTIN) 300 mg capsule Sig: Take 1 Cap by mouth three (3) times daily as needed. Dispense:  270 Cap Refill:  1 Verbal and written instructions (see AVS) provided.  Patient expresses understanding of diagnosis and treatment plan. Health Maintenance Due Topic Date Due  
 Hepatitis C Screening  1965  HEMOGLOBIN A1C Q6M  1965  
 FOOT EXAM Q1  08/12/1975  
 EYE EXAM RETINAL OR DILATED  08/12/1975  Pneumococcal 19-64 Medium Risk (1 of 1 - PPSV23) 08/12/1984  
 DTaP/Tdap/Td series (1 - Tdap) 08/12/1986  PAP AKA CERVICAL CYTOLOGY  08/12/1986  Shingrix Vaccine Age 50> (1 of 2) 08/12/2015  BREAST CANCER SCRN MAMMOGRAM  08/12/2015  FOBT Q 1 YEAR AGE 50-75  08/12/2015 Follow-up Disposition: 
Return in about 1 month (around 3/8/2019) for follow up with Dr. Kenna Saravia . for HM and follow up for chronic disease management BRITT Retana

## 2019-02-08 NOTE — PROGRESS NOTES
Patient is here for a a ED Follow up. 1. Have you been to the ER, urgent care clinic since your last visit? Hospitalized since your last visit? Yes, ED [facility] on 1/7/19.  
2. Have you seen or consulted any other health care providers outside of the [address] [place] [address] since your last visit? Include any pap smears or colon screening. No 
Health Maintenance Due Topic Date Due  
 Hepatitis C Screening  1965  HEMOGLOBIN A1C Q6M  1965  
 FOOT EXAM Q1  08/12/1975  
 EYE EXAM RETINAL OR DILATED  08/12/1975  Pneumococcal 19-64 Medium Risk (1 of 1 - PPSV23) 08/12/1984  
 DTaP/Tdap/Td series (1 - Tdap) 08/12/1986  PAP AKA CERVICAL CYTOLOGY  08/12/1986  Shingrix Vaccine Age 50> (1 of 2) 08/12/2015  BREAST CANCER SCRN MAMMOGRAM  08/12/2015  FOBT Q 1 YEAR AGE 50-75  08/12/2015 Health Maintenance reviewed.

## 2019-02-12 ENCOUNTER — TELEPHONE (OUTPATIENT)
Dept: FAMILY MEDICINE CLINIC | Age: 54
End: 2019-02-12

## 2019-02-12 RX ORDER — FENOFIBRATE 160 MG/1
160 TABLET ORAL DAILY
Qty: 90 TAB | Refills: 3 | Status: SHIPPED | OUTPATIENT
Start: 2019-02-12 | End: 2020-04-02 | Stop reason: SDUPTHER

## 2019-02-12 RX ORDER — GABAPENTIN 300 MG/1
900 CAPSULE ORAL 3 TIMES DAILY
Qty: 270 CAP | Refills: 3 | Status: SHIPPED | OUTPATIENT
Start: 2019-02-12 | End: 2019-04-05 | Stop reason: SDUPTHER

## 2019-02-12 NOTE — TELEPHONE ENCOUNTER
Pt is calling wanting to speak with Dr Peg Thomas or nurse in ref to the script he gave her she states it is wrong and another med that wasn't called in

## 2019-02-12 NOTE — TELEPHONE ENCOUNTER
Spoke with MsDmitri Stovall, She states that her gabapentin is supposed to be 900mg 3 times a day and that Dr. Malik carpenter originally wrote her for this but he has since moved out of [place]. She also wanted to let Dr. Cole Valenzuela know she is no longer taking her elavil. And also wanted refill of fenofibrate.      Last visit: 2/8/19    Last refill: 2/8/19    : 2/5/19

## 2019-02-19 DIAGNOSIS — R07.89 STERNUM PAIN: ICD-10-CM

## 2019-02-19 DIAGNOSIS — M96.89 NONUNION OF STERNUM AFTER STERNOTOMY: ICD-10-CM

## 2019-02-19 RX ORDER — OXYCODONE AND ACETAMINOPHEN 7.5; 325 MG/1; MG/1
1 TABLET ORAL
Qty: 20 TAB | Refills: 0 | Status: SHIPPED | OUTPATIENT
Start: 2019-02-19 | End: 2019-06-05

## 2019-02-19 NOTE — TELEPHONE ENCOUNTER
Spoke with MsDmitri Silvestre Arzola. She just had a chest xray done at [facility]. She says she's now feeling weak, sweaty, like she's going to faint, short of breath and it hurts when she takes a deep breath in. I advised her to not wait and to go to urgent care or the ER at [facility]. She stated the wait to be seen was crazy and she would not go to their ER. She asked to get the results from Dr. Julien Sevilla office from her chest x ray she just had today. I called their office and they faxed over xray results. Neg. For pneumonia. Will place results on Dr. Ronna Newman desk. Notified Ms. Day results were received. She also requested more pain medication.    Last visit: 2/8/19    Last refill: 2/8/19    : 2/9/19

## 2019-02-25 ENCOUNTER — TELEPHONE (OUTPATIENT)
Dept: FAMILY MEDICINE CLINIC | Age: 54
End: 2019-02-25

## 2019-02-25 NOTE — TELEPHONE ENCOUNTER
I am not going to give that advice over the phone. She should not take her medication any differently     A see the scans from the emergency room visit and everything looked okay. I unfortunately cannot see the emergency room doctor's note. I would have expected her pain to be better by now, not worse.   Unfortunately because I have not seen her since her surgery,  I do not have anything more to suggest.  Would need to evaluate her

## 2019-02-25 NOTE — TELEPHONE ENCOUNTER
Spoke with Ms. Racheal Michelle. She says her sternum pain level is through the roof and she can't get control of it. She states she is now having a herpes outbreak and that the DrDmtiri At the ER said that may be contributing factor to her pain level. She wants to know if Dr. Mis Rosas wants her to take 2 pills in the morning and afternoon of her oxycodone. I told her I would give her a call back with what he says. She voiced understanding.

## 2019-02-26 RX ORDER — MICONAZOLE NITRATE 2 %
1 CREAM WITH APPLICATOR VAGINAL
Qty: 45 G | Refills: 0 | Status: SHIPPED | OUTPATIENT
Start: 2019-02-26 | End: 2019-06-05

## 2019-02-26 NOTE — TELEPHONE ENCOUNTER
She scheduled for next week with Dr. Sharron Barnes wouldn't see anyone else.  She did say she has a yeast infection and would like a Rx for Monistat cream. She said noting else works and she is on a fixed income so she needs a RX

## 2019-03-28 RX ORDER — LIDOCAINE 50 MG/G
PATCH TOPICAL
Qty: 30 PATCH | Refills: 2 | Status: SHIPPED | OUTPATIENT
Start: 2019-03-28 | End: 2019-09-17 | Stop reason: SDUPTHER

## 2019-04-05 ENCOUNTER — OFFICE VISIT (OUTPATIENT)
Dept: NEUROLOGY | Age: 54
End: 2019-04-05

## 2019-04-05 VITALS
BODY MASS INDEX: 40.82 KG/M2 | HEIGHT: 65 IN | DIASTOLIC BLOOD PRESSURE: 70 MMHG | SYSTOLIC BLOOD PRESSURE: 130 MMHG | OXYGEN SATURATION: 98 % | HEART RATE: 82 BPM | WEIGHT: 245 LBS

## 2019-04-05 DIAGNOSIS — G89.4 CHRONIC PAIN SYNDROME: Primary | ICD-10-CM

## 2019-04-05 DIAGNOSIS — G25.81 RLS (RESTLESS LEGS SYNDROME): ICD-10-CM

## 2019-04-05 RX ORDER — GABAPENTIN 300 MG/1
900 CAPSULE ORAL 3 TIMES DAILY
Qty: 270 CAP | Refills: 3 | Status: SHIPPED | OUTPATIENT
Start: 2019-04-05 | End: 2019-09-16 | Stop reason: SDUPTHER

## 2019-04-05 RX ORDER — PRAMIPEXOLE DIHYDROCHLORIDE 0.25 MG/1
TABLET ORAL
Qty: 120 TAB | Refills: 1 | Status: SHIPPED | OUTPATIENT
Start: 2019-04-05 | End: 2019-06-05

## 2019-04-05 NOTE — PROGRESS NOTES
HISTORY OF PRESENT ILLNESS  Nohemi Stovall is a 48 y.o. female. 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 She apparently developed spasticity number of years ago and was found to have cervical spinal stenosis. She underwent surgery for cervical spinal stenosis and walked better but now has problems with jerking tremors in her upper and lower extremities. She also has trouble with severe numbness in her upper extremities, she says she has had significant numbness in her feet and lower legs for a long time. She does have diabetes. She had an EMG by Dr. Ruby Walton generalized neuropathy consistent with diabetes, she also had bilateral carpal tunnel syndrome left worse than right and a left ulnar neuropathy as well. She had the ulnar neuropathy repaired and it helped and she has less pain but she now has an occasional electrical shooting pain that goes from the ulnar notch at the elbow up into the neck. She is also having some pain radiating from her right toe up into the lower shin area. It is shooting abut rapidly dissipates. She is now taking gabapentin 600 mg 3 times a day. She returns today, she continues with her diffuse pain. It is significant and impairs her from doing a great many things. This all came on after she had a surgery for cervical spinal stenosis. It is some form of diffuse neuropathic pain. She is taking gabapentin 900 mg 3 times a day. She is complaining about pain at night it sounds an awful lot like restless leg syndrome. She denies any current bowel or bladder complaints. She has had diffuse workup in the past including scans of her complete spine and an EMG that simply was not revealing for any treatable problem after she had the cervical spine surgery.       Review of Systems   Constitutional:        Review of systems is positive for anxiety, chest pain, falls, fatigue, muscle pain, muscle weakness, neuropathy, shortness of breath, snoring and difficulty swallowing. The review of systems done all others negative     Current Outpatient Medications on File Prior to Visit   Medication Sig Dispense Refill    lidocaine (LIDODERM) 5 % APPLY 1 PATCH TOPICALLY ONCE DAILY EVERY 12 HOURS 30 Patch 2    miconazole (MONISTAT 7) 2 % vaginal cream Insert 1 Applicator into vagina nightly. 45 g 0    oxyCODONE-acetaminophen (PERCOCET 7.5) 7.5-325 mg per tablet Take 1 Tab by mouth every eight (8) hours as needed for Pain. Max Daily Amount: 3 Tabs. 20 Tab 0    insulin lispro (HUMALOG U-100 INSULIN) 100 unit/mL injection 30 Units by SubCUTAneous route Before breakfast, lunch, and dinner. 81 mL 3    fenofibrate (LOFIBRA) 160 mg tablet Take 1 Tab by mouth daily. 90 Tab 3    gabapentin (NEURONTIN) 300 mg capsule Take 3 Caps by mouth three (3) times daily. 270 Cap 3    gabapentin (NEURONTIN) 300 mg capsule Take 1 Cap by mouth three (3) times daily as needed. 270 Cap 1    fluticasone (FLONASE SENSIMIST) 27.5 mcg/actuation nasal spray 2 Sprays by Nasal route daily. 5.9 g 2    ezetimibe (ZETIA) 10 mg tablet Take  by mouth.  ondansetron (ZOFRAN ODT) 8 mg disintegrating tablet Take 1 Tab by mouth every eight (8) hours as needed for Nausea. 30 Tab 2    dicyclomine (BENTYL) 10 mg capsule Take 1 Cap by mouth three (3) times daily as needed. (Patient taking differently: Take 10 mg by mouth four (4) times daily.) 90 Cap 3    OTHER Please fit for diabetic foot wear. E11.21, M20.60 1 Each 0    Walker misc Please dispense a rolling walkerIt M54.5, G89.29, R26.9 1 Each 0    aspirin 81 mg chewable tablet Take 81 mg by mouth daily.  pantoprazole (PROTONIX) 40 mg tablet Take 1 Tab by mouth daily. 90 Tab 3    cyclobenzaprine (FLEXERIL) 10 mg tablet Take 1 Tab by mouth nightly as needed for Muscle Spasm(s). 30 Tab 1    diclofenac (VOLTAREN) 1 % gel Apply  to affected area four (4) times daily. 30 g 2    rosuvastatin (CRESTOR) 20 mg tablet Take 1 Tab by mouth nightly.  90 Tab 3    clopidogrel (PLAVIX) 75 mg tab Take 1 Tab by mouth daily. 90 Tab 3    TENS Units scott Apply to right lower back for 30 min daily as needed for low back pain 1 Device 2    miscellaneous medical supply (BLOOD PRESSURE CUFF) misc Check blood pressure daily 1 Each 0    Blood Pressure Monitor (BLOOD PRESSURE KIT) kit Check blood pressure daily 1 Kit 0    amitriptyline (ELAVIL) 10 mg tablet 1 p.o. nightly for 2 weeks then 2 p.o. nightly for 2 weeks then 3 p.o. nightly. Indications: NEUROPATHIC PAIN 90 Tab 5    methocarbamol (ROBAXIN) 500 mg tablet 1 p.o. every 6 hours as needed spasms  Indications: Muscle Spasm 120 Tab 3    meclizine (ANTIVERT) 25 mg tablet Take 1 Tab by mouth three (3) times daily as needed. 60 Tab 1    metFORMIN (GLUCOPHAGE) 1,000 mg tablet Take 1,000 mg by mouth two (2) times daily (with meals).  diclofenac EC (VOLTAREN) 75 mg EC tablet Take 1 Tab by mouth two (2) times daily as needed. 30 Tab 2    ONETOUCH ULTRA TEST strip       DULoxetine (CYMBALTA) 60 mg capsule Take 60 mg by mouth daily.  LANTUS 100 unit/mL injection       Insulin Syringe-Needle U-100 1 mL 31 x 5/16\" syrg   3     No current facility-administered medications on file prior to visit. Past Medical History:   Diagnosis Date    Diabetes ([facility]     Fibula fracture     right    Hip fracture ([facility])     Myocardial infarct ([facility] [address] [facility]      Family History   Problem Relation Age of Onset    Cancer Mother     Diabetes Mother     Heart Disease Mother     Heart Disease Father     Diabetes Father      Social History     Tobacco Use    Smoking status: Former Smoker    Smokeless tobacco: Never Used   Substance Use Topics    Alcohol use: No    Drug use: Yes     Types: Cocaine     There were no vitals taken for this visit. Physical Exam   Constitutional: She appears well-developed and well-nourished. HENT:   Mouth/Throat: Oropharyngeal exudate present. Musculoskeletal: Normal range of motion.  She exhibits edema and tenderness. She exhibits no deformity. She is markedly tender in her feet. Neurological:   Speech, language and mentation are she now has an ulnar scar on the left as well at the elbow normal cranial nerves II through XII are normal carotid pulses are brisk and no bruits she has hypersensitive upper extremities especially the hands and fingers. She has undergone carpal tunnel repair on both hands. She does have weakness of her APBs bilaterally. Skin: Skin is warm and dry. Psychiatric: She has a normal mood and affect. Her behavior is normal. Judgment and thought content normal.       ASSESSMENT and PLAN  GAIT DISORDER   She has a mild spastic gait disorder undoubtedly due to the cervical myelopathy she suffered, there is nothing to be done for that. UE HAND NUMBNESS   her hand numbness is better since she had her carpal tunnel syndrome repaired bilaterally. She is still complaining of pain at the left cubital tunnel, I see no reason to attempt to do anything as it is a long-term chronic problem. LE NUMBNESS      SPASMS/TREMORS  I suspect these spasms/tremors that she is having are a result of her myelopathy. I will continue her gabapentin 900 mg p.o. 3 times daily. It sounds like she may be having some restless leg syndrome complaints. I am going to try her on some ropinirole, I will start her on the 0.25 mg tablet, she will take 1 p.o. nightly for a week then 2 p.o. nightly for a week and then 3 p.o. nightly for a week and then finally 4 p.o. nightly. I have asked her to call us in about 6 weeks and let us know how she is doing and if this medication is helping anything for causing any problems. I will see her back in 4 months. DIABETES MELLITUS  Stroke Risk, stable, managed by primary care    HYPERLIPIDEMIA  Stroke risk, stable, managed by primary care        This note will not be viewable in [facility].

## 2019-04-05 NOTE — PATIENT INSTRUCTIONS

## 2019-04-22 DIAGNOSIS — R07.89 CHEST WALL PAIN: ICD-10-CM

## 2019-04-22 RX ORDER — CLOPIDOGREL BISULFATE 75 MG/1
75 TABLET ORAL DAILY
Qty: 90 TAB | Refills: 3 | Status: SHIPPED | OUTPATIENT
Start: 2019-04-22 | End: 2020-10-12 | Stop reason: SDUPTHER

## 2019-04-22 RX ORDER — DULOXETIN HYDROCHLORIDE 60 MG/1
60 CAPSULE, DELAYED RELEASE ORAL DAILY
Qty: 30 CAP | Refills: 0 | Status: SHIPPED | OUTPATIENT
Start: 2019-04-22 | End: 2019-08-27 | Stop reason: SDUPTHER

## 2019-04-22 RX ORDER — DICLOFENAC SODIUM 10 MG/G
GEL TOPICAL 4 TIMES DAILY
Qty: 30 G | Refills: 2 | Status: SHIPPED | OUTPATIENT
Start: 2019-04-22 | End: 2019-06-05 | Stop reason: SDUPTHER

## 2019-04-22 NOTE — TELEPHONE ENCOUNTER
From [place]:  Patient is currently at her mother's house without transportation and is requesting that a prescription for Cymbalta be sent to the [facility] located on [address] instead of the pharmacy she usually deals with. Pt does not have her medication with her and the current prescription ran out of refills. Patient is not able to come in for an appointment at this time due to transportation issues. Pt can be reached at [phone].     -----------  Not sure if this has been done yet or not. If it has, pamardDmitri Flores

## 2019-04-22 NOTE — TELEPHONE ENCOUNTER
Chief Complaint   Patient presents with    Medication Refill     Last refill: 12/14/2015  Last Ov: 2/8/19

## 2019-04-22 NOTE — TELEPHONE ENCOUNTER
Requested Prescriptions     Pending Prescriptions Disp Refills    diclofenac (VOLTAREN) 1 % gel 30 g 2     Sig: Apply  to affected area four (4) times daily.  clopidogrel (PLAVIX) 75 mg tab 90 Tab 3     Sig: Take 1 Tab by mouth daily. Requested by patient.

## 2019-04-22 NOTE — TELEPHONE ENCOUNTER
Chief Complaint   Patient presents with    Medication Refill     Last refill: 9/10/2018  Last Ov: 2/8/19

## 2019-04-23 ENCOUNTER — TELEPHONE (OUTPATIENT)
Dept: NEUROLOGY | Age: 54
End: 2019-04-23

## 2019-05-09 ENCOUNTER — TELEPHONE (OUTPATIENT)
Dept: NEUROLOGY | Age: 54
End: 2019-05-09

## 2019-05-10 DIAGNOSIS — J06.9 URI WITH COUGH AND CONGESTION: ICD-10-CM

## 2019-05-13 RX ORDER — FLUTICASONE FUROATE 27.5 MCG
SPRAY, SUSPENSION (ML) NASAL
Qty: 10 G | Refills: 11 | Status: SHIPPED | OUTPATIENT
Start: 2019-05-13 | End: 2020-10-12 | Stop reason: SDUPTHER

## 2019-05-14 ENCOUNTER — HOSPITAL ENCOUNTER (EMERGENCY)
Age: 54
Discharge: HOME | End: 2019-05-14
Attending: EMERGENCY MEDICINE
Payer: MEDICAID

## 2019-05-14 ENCOUNTER — APPOINTMENT (OUTPATIENT)
Dept: CT IMAGING | Age: 54
End: 2019-05-14
Attending: EMERGENCY MEDICINE
Payer: MEDICAID

## 2019-05-14 VITALS
HEART RATE: 91 BPM | DIASTOLIC BLOOD PRESSURE: 71 MMHG | BODY MASS INDEX: 38.32 KG/M2 | SYSTOLIC BLOOD PRESSURE: 136 MMHG | RESPIRATION RATE: 18 BRPM | WEIGHT: 230 LBS | TEMPERATURE: 98.4 F | OXYGEN SATURATION: 98 % | HEIGHT: 65 IN

## 2019-05-14 DIAGNOSIS — J02.9 ACUTE PHARYNGITIS, UNSPECIFIED ETIOLOGY: Primary | ICD-10-CM

## 2019-05-14 DIAGNOSIS — R73.9 HYPERGLYCEMIA: ICD-10-CM

## 2019-05-14 DIAGNOSIS — E04.1 THYROID NODULE: ICD-10-CM

## 2019-05-14 LAB
ALBUMIN SERPL-MCNC: 3.4 G/DL (ref 3.5–5)
ALBUMIN/GLOB SERPL: 0.9 {RATIO} (ref 1.1–2.2)
ALP SERPL-CCNC: 80 U/L (ref 45–117)
ALT SERPL-CCNC: 45 U/L (ref 12–78)
ANION GAP SERPL CALC-SCNC: 5 MMOL/L (ref 5–15)
AST SERPL-CCNC: 31 U/L (ref 15–37)
BASOPHILS # BLD: 0 K/UL (ref 0–0.1)
BASOPHILS NFR BLD: 0 % (ref 0–1)
BILIRUB SERPL-MCNC: 0.5 MG/DL (ref 0.2–1)
BUN SERPL-MCNC: 22 MG/DL (ref 6–20)
BUN/CREAT SERPL: 21 (ref 12–20)
CALCIUM SERPL-MCNC: 8.7 MG/DL (ref 8.5–10.1)
CHLORIDE SERPL-SCNC: 100 MMOL/L (ref 97–108)
CO2 SERPL-SCNC: 28 MMOL/L (ref 21–32)
CREAT SERPL-MCNC: 1.05 MG/DL (ref 0.55–1.02)
DEPRECATED S PYO AG THROAT QL EIA: NEGATIVE
DIFFERENTIAL METHOD BLD: NORMAL
EOSINOPHIL # BLD: 0.1 K/UL (ref 0–0.4)
EOSINOPHIL NFR BLD: 1 % (ref 0–7)
ERYTHROCYTE [DISTWIDTH] IN BLOOD BY AUTOMATED COUNT: 13.2 % (ref 11.5–14.5)
GLOBULIN SER CALC-MCNC: 3.6 G/DL (ref 2–4)
GLUCOSE SERPL-MCNC: 405 MG/DL (ref 65–100)
HCT VFR BLD AUTO: 43 % (ref 35–47)
HGB BLD-MCNC: 14.5 G/DL (ref 11.5–16)
IMM GRANULOCYTES # BLD AUTO: 0 K/UL (ref 0–0.04)
IMM GRANULOCYTES NFR BLD AUTO: 0 % (ref 0–0.5)
LYMPHOCYTES # BLD: 2 K/UL (ref 0.8–3.5)
LYMPHOCYTES NFR BLD: 26 % (ref 12–49)
MCH RBC QN AUTO: 30.2 PG (ref 26–34)
MCHC RBC AUTO-ENTMCNC: 33.7 G/DL (ref 30–36.5)
MCV RBC AUTO: 89.6 FL (ref 80–99)
MONOCYTES # BLD: 0.6 K/UL (ref 0–1)
MONOCYTES NFR BLD: 8 % (ref 5–13)
NEUTS SEG # BLD: 4.9 K/UL (ref 1.8–8)
NEUTS SEG NFR BLD: 65 % (ref 32–75)
NRBC # BLD: 0 K/UL (ref 0–0.01)
NRBC BLD-RTO: 0 PER 100 WBC
PLATELET # BLD AUTO: 281 K/UL (ref 150–400)
PMV BLD AUTO: 9.8 FL (ref 8.9–12.9)
POTASSIUM SERPL-SCNC: 4.5 MMOL/L (ref 3.5–5.1)
PROT SERPL-MCNC: 7 G/DL (ref 6.4–8.2)
RBC # BLD AUTO: 4.8 M/UL (ref 3.8–5.2)
SODIUM SERPL-SCNC: 133 MMOL/L (ref 136–145)
WBC # BLD AUTO: 7.7 K/UL (ref 3.6–11)

## 2019-05-14 PROCEDURE — 70491 CT SOFT TISSUE NECK W/DYE: CPT

## 2019-05-14 PROCEDURE — 99283 EMERGENCY DEPT VISIT LOW MDM: CPT

## 2019-05-14 PROCEDURE — 74011636320 HC RX REV CODE- 636/320: Performed by: EMERGENCY MEDICINE

## 2019-05-14 PROCEDURE — 87070 CULTURE OTHR SPECIMN AEROBIC: CPT

## 2019-05-14 PROCEDURE — 74011250637 HC RX REV CODE- 250/637: Performed by: EMERGENCY MEDICINE

## 2019-05-14 PROCEDURE — 36415 COLL VENOUS BLD VENIPUNCTURE: CPT

## 2019-05-14 PROCEDURE — 87880 STREP A ASSAY W/OPTIC: CPT

## 2019-05-14 PROCEDURE — 85025 COMPLETE CBC W/AUTO DIFF WBC: CPT

## 2019-05-14 PROCEDURE — 80053 COMPREHEN METABOLIC PANEL: CPT

## 2019-05-14 RX ADMIN — IOPAMIDOL 100 ML: 612 INJECTION, SOLUTION INTRAVENOUS at 19:07

## 2019-05-14 RX ADMIN — DIPHENHYDRAMINE HYDROCHLORIDE AND LIDOCAINE HYDROCHLORIDE AND ALUMINUM HYDROXIDE AND MAGNESIUM HYDRO 5 ML: KIT at 18:39

## 2019-05-14 NOTE — ED NOTES
Patient stated, \" Im not leaving there has to be something wrong with me my throat feels like glass\".

## 2019-05-14 NOTE — ED PROVIDER NOTES
48 y.o. female with past medical history significant for diabetes and MI who presents from home via private vehicle with chief complaint of sore throat. Patient began with sore throat and pain on swallowing 5 days ago and has had cough with \"white frothy\" phlegm, diaphoresis, rhinorrhea, and bilateral ear pain since. She presented to 4 medical services today but was not evaluated at any for multiple issues with payment and insurance. Patient notes she had surgery on 01/25/2019 for \"metal in my chest\" and notes baseline chest soreness. Specifically denies leg swelling and any other pain or symptoms. There are no other acute medical concerns at this time. Social hx: Former tobacco smoker; Denies EtOH use; Cocaine use PCP: Alexandra Morfin MD 
 
Note written by Rosemarie Cole, as dictated by Delisa Zarco MD 5:01 PM 
 
The history is provided by the patient. No  was used. Past Medical History:  
Diagnosis Date  Diabetes ([address]  Fibula fracture   
 right  Hip fracture ([address]  Myocardial infarct ([address] Past Surgical History:  
Procedure Laterality Date  HX CERVICAL LAMINECTOMY  HX CORONARY ARTERY BYPASS GRAFT February 2017  HX GYN    
 HX ORTHOPAEDIC    
 cervical fusion Family History:  
Problem Relation Age of Onset  Cancer Mother  Diabetes Mother  Heart Disease Mother  Heart Disease Father  Diabetes Father Social History Socioeconomic History  Marital status:  Spouse name: Not on file  Number of children: Not on file  Years of education: Not on file  Highest education level: Not on file Occupational History  Not on file Social Needs  Financial resource strain: Not on file  Food insecurity:  
  Worry: Not on file Inability: Not on file  Transportation needs:  
  Medical: Not on file Non-medical: Not on file Tobacco Use  
  Smoking status: Former Smoker  Smokeless tobacco: Never Used Substance and Sexual Activity  Alcohol use: No  
 Drug use: Yes Types: Cocaine  Sexual activity: Never Lifestyle  Physical activity:  
  Days per week: Not on file Minutes per session: Not on file  Stress: Not on file Relationships  Social connections:  
  Talks on phone: Not on file Gets together: Not on file Attends [employer] service: Not on file Active member of club or organization: Not on file Attends meetings of clubs or organizations: Not on file Relationship status: Not on file  Intimate partner violence:  
  Fear of current or ex partner: Not on file Emotionally abused: Not on file Physically abused: Not on file Forced sexual activity: Not on file Other Topics Concern  Not on file Social History Narrative  Not on file ALLERGIES: Celexa [citalopram]; Diflucan [fluconazole]; Flagyl [metronidazole]; Lisinopril; Metformin; Neosporin [benzalkonium chloride]; Pcn [penicillins]; and Sulfa (sulfonamide antibiotics) Review of Systems Constitutional: Positive for diaphoresis. HENT: Positive for ear pain, rhinorrhea, sore throat and trouble swallowing. Respiratory: Positive for cough. Cardiovascular: Positive for chest pain (\"chest soreness\"). Negative for leg swelling. All other systems reviewed and are negative. There were no vitals filed for this visit. Physical Exam  
Physical Examination: General appearance - alert, well appearing, and in no distress, oriented to person, place, and time and normal appearing weight Eyes - pupils equal and reactive, extraocular eye movements intact HEENT-b/l TMs clear, pharynx with mild erythema, no tonsillar hypertrophy or exudate, no trismus or uvula shift, no sublingual induration or tongue elevation Neck - supple, no significant adenopathy, full rom, mild fullness to anterior neck ? Chronic, no crepitus or subcutaneous air Chest - clear to auscultation, no wheezes, rales or rhonchi, symmetric air entry, well healed surgical scar to mid chest 
Heart - normal rate, regular rhythm, normal S1, S2, no murmurs, rubs, clicks or gallops Abdomen - soft, nontender, nondistended, no masses or organomegaly Back exam - full range of motion, no tenderness, palpable spasm or pain on motion Neurological - alert, oriented, normal speech, no focal findings or movement disorder noted Musculoskeletal - no joint tenderness, deformity or swelling Extremities - peripheral pulses normal, no pedal edema, no clubbing or cyanosis Skin - normal coloration and turgor, no rashes, no suspicious skin lesions noted MDM Number of Diagnoses or Management Options Amount and/or Complexity of Data Reviewed Clinical lab tests: ordered and reviewed Tests in the radiology section of CPT®: ordered and reviewed Decide to obtain previous medical records or to obtain history from someone other than the patient: yes Review and summarize past medical records: yes Independent visualization of images, tracings, or specimens: yes Patient Progress Patient progress: improved Procedures No acute process on labs or CT. Elevated glucose without DKA. Needs f/u with pcp. Pt tolerating PO. Return to ED for worsening symptoms. VSS.

## 2019-05-14 NOTE — ED TRIAGE NOTES
\"I've been thinking my sore throat would go away, but it feels like I'm swallowing razor blades. It keeps getting worse. It started on Friday. \"

## 2019-05-14 NOTE — DISCHARGE INSTRUCTIONS
Learning About High Blood Sugar  What is high blood sugar? Your body turns the food you eat into glucose (sugar), which it uses for energy. But if your body isn't able to use the sugar right away, it can build up in your blood and lead to high blood sugar. When the amount of sugar in your blood stays too high for too much of the time, you may have diabetes. Diabetes is a disease that can cause serious health problems. The good news is that lifestyle changes may help you get your blood sugar back to normal and avoid or delay diabetes. What causes high blood sugar? Sugar (glucose) can build up in your blood if you:  · Are overweight. · Have a family history of diabetes. · Take certain medicines, such as steroids. What are the symptoms? Having high blood sugar may not cause any symptoms at all. Or it may make you feel very thirsty or very hungry. You may also urinate more often than usual, have blurry vision, or lose weight without trying. How is high blood sugar treated? You can take steps to lower your blood sugar level if you understand what makes it get higher. Your doctor may want you to learn how to test your blood sugar level at home. Then you can see how illness, stress, or different kinds of food or medicine raise or lower your blood sugar level. Other tests may be needed to see if you have diabetes. How can you prevent high blood sugar? · Watch your weight. If you're overweight, losing just a small amount of weight may help. Reducing fat around your waist is most important. · Limit the amount of calories, sweets, and unhealthy fat you eat. Ask your doctor if a dietitian can help you. A registered dietitian can help you create meal plans that fit your lifestyle. · Get at least 30 minutes of exercise on most days of the week. Exercise helps control your blood sugar. It also helps you maintain a healthy weight. Walking is a good choice.  You also may want to do other activities, such as running, swimming, cycling, or playing tennis or team sports. · If your doctor prescribed medicines, take them exactly as prescribed. Call your doctor if you think you are having a problem with your medicine. You will get more details on the specific medicines your doctor prescribes. Follow-up care is a key part of your treatment and safety. Be sure to make and go to all appointments, and call your doctor if you are having problems. It's also a good idea to know your test results and keep a list of the medicines you take. Where can you learn more? Go to http://cintia-venancio.info/. Enter O108 in the search box to learn more about \"Learning About High Blood Sugar. \"  Current as of: July 25, 2018  Content Version: 11.9  © 2006-2018 [employer]. Care instructions adapted under license by [employer] (which disclaims liability or warranty for this information). If you have questions about a medical condition or this instruction, always ask your healthcare professional. [place] [address] any warranty or liability for your use of this information. Patient Education        Sore Throat: Care Instructions  Your Care Instructions    Infection by bacteria or a virus causes most sore throats. Cigarette smoke, dry air, air pollution, allergies, and yelling can also cause a sore throat. Sore throats can be painful and annoying. Fortunately, most sore throats go away on their own. If you have a bacterial infection, your doctor may prescribe antibiotics. Follow-up care is a key part of your treatment and safety. Be sure to make and go to all appointments, and call your doctor if you are having problems. It's also a good idea to know your test results and keep a list of the medicines you take. How can you care for yourself at home? · If your doctor prescribed antibiotics, take them as directed. Do not stop taking them just because you feel better.  You need to take the full course of antibiotics. · Gargle with warm salt water once an hour to help reduce swelling and relieve discomfort. Use 1 teaspoon of salt mixed in 1 cup of warm water. · Take an over-the-counter pain medicine, such as acetaminophen (Tylenol), ibuprofen (Advil, Motrin), or naproxen (Aleve). Read and follow all instructions on the label. · Be careful when taking over-the-counter cold or flu medicines and Tylenol at the same time. Many of these medicines have acetaminophen, which is Tylenol. Read the labels to make sure that you are not taking more than the recommended dose. Too much acetaminophen (Tylenol) can be harmful. · Drink plenty of fluids. Fluids may help soothe an irritated throat. Hot fluids, such as tea or soup, may help decrease throat pain. · Use over-the-counter throat lozenges to soothe pain. Regular cough drops or hard candy may also help. These should not be given to young children because of the risk of choking. · Do not smoke or allow others to smoke around you. If you need help quitting, talk to your doctor about stop-smoking programs and medicines. These can increase your chances of quitting for good. · Use a vaporizer or humidifier to add moisture to your bedroom. Follow the directions for cleaning the machine. When should you call for help? Call your doctor now or seek immediate medical care if:    · You have new or worse trouble swallowing.     · Your sore throat gets much worse on one side.    Watch closely for changes in your health, and be sure to contact your doctor if you do not get better as expected. Where can you learn more? Go to http://cintia-venancio.info/. Enter 062 [id number] in the search box to learn more about \"Sore Throat: Care Instructions. \"  Current as of: March 27, 2018  Content Version: 11.9  © 3879-4834 [employer].  Care instructions adapted under license by [employer] (which disclaims liability or warranty for this information). If you have questions about a medical condition or this instruction, always ask your healthcare professional. [address] any warranty or liability for your use of this information. We hope that we have addressed all of your medical concerns. The examination and treatment you received in the Emergency Department were for an emergent problem and were not intended as complete care. It is important that you follow up with your healthcare provider(s) for ongoing care. If your symptoms worsen or do not improve as expected, and you are unable to reach your usual health care provider(s), you should return to the Emergency Department. Today's healthcare is undergoing tremendous change, and patient satisfaction surveys are one of the many tools to assess the quality of medical care. You may receive a survey from the "[employer] regarding your experience in the Emergency Department. I hope that your experience has been completely positive, particularly the medical care that I provided. As such, please participate in the survey; anything less than excellent does not meet my expectations or intentions. [facility] hospital[facility] and 8 [facility] participate in nationally recognized quality of care measures. If your blood pressure is greater than 120/80, as reported below, we urge that you seek medical care to address the potential of high blood pressure, commonly known as hypertension. Hypertension can be hereditary or can be caused by certain medical conditions, pain, stress, or \"white coat syndrome. \"       Please make an appointment with your health care provider(s) for follow up of your Emergency Department visit. VITALS:   Patient Vitals for the past 8 hrs:   Temp Pulse Resp BP SpO2   05/14/19 1702 98.4 °F (36.9 °C) 91 18 136/71 98 %          Thank you for allowing us to provide you with medical care today.   We realize that you have many choices for your emergency care needs. Please choose us in the future for any continued health care needs. Gee Upsultana Chavarria, [address] [place] [address] [address]   Office: [phone]            Recent Results (from the past 24 hour(s))   CBC WITH AUTOMATED DIFF    Collection Time: 05/14/19  5:15 PM   Result Value Ref Range    WBC 7.7 3.6 - 11.0 K/uL    RBC 4.80 3.80 - 5.20 M/uL    HGB 14.5 11.5 - 16.0 g/dL    HCT 43.0 35.0 - 47.0 %    MCV 89.6 80.0 - 99.0 FL    MCH 30.2 26.0 - 34.0 PG    MCHC 33.7 30.0 - 36.5 g/dL    RDW 13.2 11.5 - 14.5 %    PLATELET 308 139 - 000 K/uL    MPV 9.8 8.9 - 12.9 FL    NRBC 0.0 0  WBC    ABSOLUTE NRBC 0.00 0.00 - 0.01 K/uL    NEUTROPHILS 65 32 - 75 %    LYMPHOCYTES 26 12 - 49 %    MONOCYTES 8 5 - 13 %    EOSINOPHILS 1 0 - 7 %    BASOPHILS 0 0 - 1 %    IMMATURE GRANULOCYTES 0 0.0 - 0.5 %    ABS. NEUTROPHILS 4.9 1.8 - 8.0 K/UL    ABS. LYMPHOCYTES 2.0 0.8 - 3.5 K/UL    ABS. MONOCYTES 0.6 0.0 - 1.0 K/UL    ABS. EOSINOPHILS 0.1 0.0 - 0.4 K/UL    ABS. BASOPHILS 0.0 0.0 - 0.1 K/UL    ABS. IMM. GRANS. 0.0 0.00 - 0.04 K/UL    DF AUTOMATED     METABOLIC PANEL, COMPREHENSIVE    Collection Time: 05/14/19  5:15 PM   Result Value Ref Range    Sodium 133 (L) 136 - 145 mmol/L    Potassium 4.5 3.5 - 5.1 mmol/L    Chloride 100 97 - 108 mmol/L    CO2 28 21 - 32 mmol/L    Anion gap 5 5 - 15 mmol/L    Glucose 405 (H) 65 - 100 mg/dL    BUN 22 (H) 6 - 20 MG/DL    Creatinine 1.05 (H) 0.55 - 1.02 MG/DL    BUN/Creatinine ratio 21 (H) 12 - 20      GFR est AA >60 >60 ml/min/1.73m2    GFR est non-AA 55 (L) >60 ml/min/1.73m2    Calcium 8.7 8.5 - 10.1 MG/DL    Bilirubin, total 0.5 0.2 - 1.0 MG/DL    ALT (SGPT) 45 12 - 78 U/L    AST (SGOT) 31 15 - 37 U/L    Alk.  phosphatase 80 45 - 117 U/L    Protein, total 7.0 6.4 - 8.2 g/dL    Albumin 3.4 (L) 3.5 - 5.0 g/dL    Globulin 3.6 2.0 - 4.0 g/dL    A-G Ratio 0.9 (L) 1.1 - 2.2     STREP AG SCREEN, GROUP A Collection Time: 05/14/19  5:15 PM   Result Value Ref Range    Group A Strep Ag ID NEGATIVE  NEG         Ct Neck Soft Tissue W Cont    Result Date: 5/14/2019  EXAM: CT NECK SOFT TISSUE W CONT INDICATION: sore throat, neck pain, neck swelling COMPARISON: None. CONTRAST: 100 mL of Isovue-300. TECHNIQUE: Multislice helical CT was performed from the mid calvarium to the aortic arch during uneventful rapid bolus intravenous contrast administration. Contiguous 2.5 mm axial images were reconstructed and lung and soft tissue windows were generated. Coronal and sagittal reformations were generated. CT dose reduction was achieved through use of a standardized protocol tailored for this examination and automatic exposure control for dose modulation. FINDINGS: No mass or adenopathy is identified within the neck. The carotid and jugular vessels enhance normally. There is a 1 cm left thyroid nodule. The parotid and submandibular glands are unremarkable. No nasopharyngeal, pharyngeal or laryngeal mass is identified. No abnormalities are identified in the visualized portions of the brain or orbits. The visualized mastoid air cells and paranasal sinuses are clear. The visualized portions of the lung apices are clear. Anterior fusion changes are noted C4-C6 as well as laminectomy changes.      IMPRESSION: No acute abnormality identified

## 2019-05-16 LAB
BACTERIA SPEC CULT: NORMAL
SERVICE CMNT-IMP: NORMAL

## 2019-05-25 NOTE — TELEPHONE ENCOUNTER
Pt is calling again stating she needs her insulin to get in her due to bs high and she is getting confused cant this be done sooner than later    Requested Prescriptions     Pending Prescriptions Disp Refills    insulin lispro (HUMALOG U-100 INSULIN) 100 unit/mL injection 3 Vial 3     Si units with each meal. 25-May-2019 00:35

## 2019-05-28 ENCOUNTER — DOCUMENTATION ONLY (OUTPATIENT)
Dept: FAMILY MEDICINE CLINIC | Age: 54
End: 2019-05-28

## 2019-05-28 NOTE — PROGRESS NOTES
Chief Complaint   Patient presents with    Documentation     CME URINARY INCONTINENCE SUPPLY     Signed CME form faxed to [address] [place] [address] [phone] and confirmation page received.

## 2019-06-05 ENCOUNTER — OFFICE VISIT (OUTPATIENT)
Dept: FAMILY MEDICINE CLINIC | Age: 54
End: 2019-06-05

## 2019-06-05 VITALS
DIASTOLIC BLOOD PRESSURE: 83 MMHG | SYSTOLIC BLOOD PRESSURE: 132 MMHG | HEART RATE: 86 BPM | BODY MASS INDEX: 39.99 KG/M2 | HEIGHT: 65 IN | TEMPERATURE: 98 F | WEIGHT: 240 LBS | RESPIRATION RATE: 20 BRPM | OXYGEN SATURATION: 98 %

## 2019-06-05 DIAGNOSIS — M79.18 MYOFASCIAL PAIN: ICD-10-CM

## 2019-06-05 DIAGNOSIS — G44.219 EPISODIC TENSION-TYPE HEADACHE, NOT INTRACTABLE: ICD-10-CM

## 2019-06-05 DIAGNOSIS — R07.89 CHEST WALL PAIN: ICD-10-CM

## 2019-06-05 DIAGNOSIS — E11.21 TYPE 2 DIABETES WITH NEPHROPATHY (HCC): ICD-10-CM

## 2019-06-05 DIAGNOSIS — L20.9 ATOPIC DERMATITIS, UNSPECIFIED TYPE: ICD-10-CM

## 2019-06-05 DIAGNOSIS — J30.9 ALLERGIC RHINITIS, UNSPECIFIED SEASONALITY, UNSPECIFIED TRIGGER: ICD-10-CM

## 2019-06-05 DIAGNOSIS — H54.7 VISION PROBLEM: Primary | ICD-10-CM

## 2019-06-05 RX ORDER — FLUTICASONE PROPIONATE 50 MCG
2 SPRAY, SUSPENSION (ML) NASAL DAILY
Qty: 1 BOTTLE | Refills: 4 | Status: SHIPPED | OUTPATIENT
Start: 2019-06-05 | End: 2019-11-19 | Stop reason: SDUPTHER

## 2019-06-05 RX ORDER — DICLOFENAC SODIUM 10 MG/G
GEL TOPICAL 4 TIMES DAILY
Qty: 30 G | Refills: 2 | Status: SHIPPED | OUTPATIENT
Start: 2019-06-05 | End: 2021-12-01

## 2019-06-05 RX ORDER — NITROGLYCERIN 0.4 MG/1
0.4 TABLET SUBLINGUAL
Qty: 30 TAB | Refills: 1 | Status: SHIPPED | OUTPATIENT
Start: 2019-06-05 | End: 2020-05-01 | Stop reason: SDUPTHER

## 2019-06-05 RX ORDER — NAPROXEN 500 MG/1
500 TABLET ORAL 2 TIMES DAILY WITH MEALS
COMMUNITY
End: 2019-11-19

## 2019-06-05 RX ORDER — CETIRIZINE HCL 10 MG
10 TABLET ORAL DAILY
Qty: 90 TAB | Refills: 3 | Status: SHIPPED | OUTPATIENT
Start: 2019-06-05 | End: 2019-08-28 | Stop reason: SDUPTHER

## 2019-06-05 RX ORDER — BACLOFEN 10 MG/1
TABLET ORAL
Refills: 1 | COMMUNITY
Start: 2019-05-27 | End: 2019-11-19

## 2019-06-05 RX ORDER — TRIAMCINOLONE ACETONIDE 1 MG/G
CREAM TOPICAL 2 TIMES DAILY
Qty: 15 G | Refills: 0 | Status: SHIPPED | OUTPATIENT
Start: 2019-06-05 | End: 2019-08-28 | Stop reason: SDUPTHER

## 2019-06-05 NOTE — PROGRESS NOTES
.  Chief Complaint   Patient presents with    Transitions Of Care     . 1. Have you been to the ER, urgent care clinic since your last visit? Hospitalized since your last visit? Yes lexi fajardo    2. Have you seen or consulted any other health care providers outside of the [address] [place] [address] since your last visit? Include any pap smears or colon screening. No    .  Health Maintenance Due   Topic Date Due    Hepatitis C Screening  1965    Pneumococcal 0-64 years (1 of 1 - PPSV23) 08/12/1971    FOOT EXAM Q1  08/12/1975    EYE EXAM RETINAL OR DILATED  08/12/1975    DTaP/Tdap/Td series (1 - Tdap) 08/12/1986    PAP AKA CERVICAL CYTOLOGY  08/12/1986    Shingrix Vaccine Age 50> (1 of 2) 08/12/2015    BREAST CANCER SCRN MAMMOGRAM  08/12/2015    FOBT Q 1 YEAR AGE 50-75  08/12/2015     . Rubia Johnson

## 2019-06-05 NOTE — PROGRESS NOTES
PARIS Stovall is a 48 y.o. female who presents to follow-up on a [facility] visit. She tells me that 3 weeks ago she experienced a sore throat and left-sided ear ache. The sore throat was described as \"razor blades\". About a week later she developed a left temporal headache and a vision change which she described as a \"film\" over her left eye and shadows in her field of vision. She went to see an ENT about this and her nose and ears checked out okay although there was a slight effusion in the left ear. She went to [address] [place] [address] and was suspected of having temporal arteritis. She was placed on high-dose steroids and a temporal biopsy was done 5/24. This was apparently normal and she was discharged on 5/27 with no continuation of steroid. About 4 days after discharge she developed a red intensely itchy rash around the biopsy site which she has been scratching at ever since. This is reminiscent of a rash that she had from the adhesive following her sternal re-apposition    She does get allergies and is only treating them with Flonase at the moment. She is using Flonase about 5 times per day, significant down her throat    She has stopped her metformin because of several hospital states that she has had, she was worried about contrast-induced nephropathy. Apparently there was an incidental thyroid nodule noted on 1 of her CT scan so she stopped taking Trulicity. She has follow-up with Dr. Willian Bellamy her endocrinologist in 2-3 weeks.   She is only managing with insulin at the moment, her blood sugars are 80-1 60 fasting but of course are much higher when she was receiving steroids 2 weeks ago    PMHx:  Past Medical History:   Diagnosis Date    [facility] [address]     Fibula fracture     right    Hip fracture (HCC)     Myocardial infarct ([facility] [address]        Meds:   Current Outpatient Medications   Medication Sig Dispense Refill    baclofen (LIORESAL) 10 mg tablet TAKE 1 TABLET BY MOUTH THREE TIMES DAILY  1    naproxen (NAPROSYN) 500 mg tablet Take 500 mg by mouth two (2) times daily (with meals).  triamcinolone acetonide (KENALOG) 0.1 % topical cream Apply  to affected area two (2) times a day. use thin layer 15 g 0    diphenhydrAMINE (BENADRYL) 2 % topical cream Apply  to affected area three (3) times daily as needed for Skin Irritation. 30 g 0    cetirizine (ZYRTEC) 10 mg tablet Take 1 Tab by mouth daily. 90 Tab 3    diclofenac (VOLTAREN) 1 % gel Apply  to affected area four (4) times daily. 30 g 2    nitroglycerin (NITROSTAT) 0.4 mg SL tablet 1 Tab by SubLINGual route every five (5) minutes as needed for Chest Pain. Up to 3 doses. 30 Tab 1    fluticasone propionate (FLONASE) 50 mcg/actuation nasal spray 2 Sprays by Both Nostrils route daily. 1 Bottle 4    CHILDREN'S FLONASE SENSIMIST 27.5 mcg/actuation nasal spray USE 2 SPRAYS BY NASAL   ONCE DAILY 10 g 11    DULoxetine (CYMBALTA) 60 mg capsule Take 1 Cap by mouth daily. 30 Cap 0    clopidogrel (PLAVIX) 75 mg tab Take 1 Tab by mouth daily. 90 Tab 3    gabapentin (NEURONTIN) 300 mg capsule Take 3 Caps by mouth three (3) times daily. 270 Cap 3    lidocaine (LIDODERM) 5 % APPLY 1 PATCH TOPICALLY ONCE DAILY EVERY 12 HOURS 30 Patch 2    insulin lispro (HUMALOG U-100 INSULIN) 100 unit/mL injection 30 Units by SubCUTAneous route Before breakfast, lunch, and dinner. 81 mL 3    fenofibrate (LOFIBRA) 160 mg tablet Take 1 Tab by mouth daily. 90 Tab 3    ezetimibe (ZETIA) 10 mg tablet Take  by mouth.  ondansetron (ZOFRAN ODT) 8 mg disintegrating tablet Take 1 Tab by mouth every eight (8) hours as needed for Nausea. 30 Tab 2    dicyclomine (BENTYL) 10 mg capsule Take 1 Cap by mouth three (3) times daily as needed. (Patient taking differently: Take 10 mg by mouth four (4) times daily.) 90 Cap 3    Walker misc Please dispense a rolling walkerIt M54.5, G89.29, R26.9 1 Each 0    aspirin 81 mg chewable tablet Take 81 mg by mouth daily.       pantoprazole (PROTONIX) 40 mg tablet Take 1 Tab by mouth daily. 90 Tab 3    rosuvastatin (CRESTOR) 20 mg tablet Take 1 Tab by mouth nightly. 90 Tab 3    Blood Pressure Monitor (BLOOD PRESSURE KIT) kit Check blood pressure daily 1 Kit 0    ONETOUCH ULTRA TEST strip       LANTUS 100 unit/mL injection       Insulin Syringe-Needle U-100 1 mL 31 x 5/16\" syrg   3       Allergies: Allergies   Allergen Reactions    Celexa [Citalopram] Other (comments)    Diflucan [Fluconazole] Nausea and Vomiting    Flagyl [Metronidazole] Hives    Lisinopril Other (comments)     Low BP    Metformin Other (comments)    Neosporin [Benzalkonium Chloride] Other (comments)    Pcn [Penicillins] Hives    Sulfa (Sulfonamide Antibiotics) Hives       Smoker:  Social History     Tobacco Use   Smoking Status Former Smoker   Smokeless Tobacco Never Used       ETOH:   Social History     Substance and Sexual Activity   Alcohol Use No       FH:   Family History   Problem Relation Age of Onset    Cancer Mother     Diabetes Mother     Heart Disease Mother     Heart Disease Father     Diabetes Father        ROS:   As listed in HPI. In addition:  Constitutional:   No headache, fever, fatigue, weight loss or weight gain      Cardiac:    No chest pain      Resp:   No cough or shortness of breath      Neuro   No loss of consciousness, dizziness, seizures      Physical Exam:  Blood pressure 132/83, pulse 86, temperature 98 °F (36.7 °C), temperature source Oral, resp. rate 20, height 5' 5\" (1.651 m), weight 240 lb (108.9 kg), SpO2 98 %. GEN: No apparent distress. Alert and oriented and responds to all questions appropriately. NEUROLOGIC:  No focal neurologic deficits. Strength and sensation grossly intact. Coordination and gait grossly intact. EXT: Well perfused. No edema. SKIN:Atopic dermatitis rash around the left temporal biopsy site.   There is no apparent cellulitis, the eczema involves the left ear and the outer part of the external canal.  There is a myofascial sternocleidomastoid which reproduces her left temporal headache       Assessment and Plan     Vision change  Concerning and no clear cause. Describes a film or shadows. May be contributing to the left sided headache because of some eye strain. Last eye exam was 1 year ago and was told everything looked surprisingly good given her long history of diabetes. She needs to get back in with her eye doctor for this complaint. It is no better and no worse over the last 3 weeks    Rash  Atopic dermatitis likely due to the adhesive used on the bandage or possibly something that she was using to cleanse the area after hospital stay. I explained the possible mechanism so that she can think about any possible exposures and stop them  Kenalog  Benadryl gel  Sensitive skin lotion  Zyrtec    Allergic rhinitis  She is describing allergies, the timeline fits for the summertime allergy season. I suspect Zyrtec will help out with the throat. Is also possible that her overusing Flonase and significant that her throat was contributing to throat irritation. Adjusted her technique on the Flonase    Diabetes  Not taking metformin or Trulicity  Insulin only at the moment, acceptable fasting blood sugars. Will let Dr. Tiffanie Ghotra manage since she has an appointment coming up very soon. Temporal biopsy was apparently normal although I do not have this record at the moment. She was told that her problem was probably muscular and referred to physical therapy for her neck and possible dry needling. She found a physical therapy office at [facility] do this. a lot of health maintenance needs to be addressed we could get to today. Please return when feeling better          ICD-10-CM ICD-9-CM    1. Vision problem H54.7 V41.0    2. Chest wall pain R07.89 786.52 diclofenac (VOLTAREN) 1 % gel   3.  Allergic rhinitis, unspecified seasonality, unspecified trigger J30.9 477.9 cetirizine (ZYRTEC) 10 mg tablet      fluticasone propionate (FLONASE) 50 mcg/actuation nasal spray   4. Atopic dermatitis, unspecified type L20.9 691.8 triamcinolone acetonide (KENALOG) 0.1 % topical cream      diphenhydrAMINE (BENADRYL) 2 % topical cream   5. Type 2 diabetes with nephropathy (HCC) E11.21 250.40      583.81    6. Episodic tension-type headache, not intractable G44.219 339.11    7. Myofascial pain M79.18 729.1        AVS given. Pt expressed understanding of instructions  This was a vision change, rash 40-minute visit. Greater than 50% of the time was spent counseling the patient on, neck pain.

## 2019-08-07 ENCOUNTER — TELEPHONE (OUTPATIENT)
Dept: FAMILY MEDICINE CLINIC | Age: 54
End: 2019-08-07

## 2019-08-27 RX ORDER — DULOXETIN HYDROCHLORIDE 60 MG/1
CAPSULE, DELAYED RELEASE ORAL
Qty: 90 CAP | Refills: 3 | Status: SHIPPED | OUTPATIENT
Start: 2019-08-27 | End: 2020-01-14

## 2019-08-28 DIAGNOSIS — J30.9 ALLERGIC RHINITIS, UNSPECIFIED SEASONALITY, UNSPECIFIED TRIGGER: ICD-10-CM

## 2019-08-28 DIAGNOSIS — L20.9 ATOPIC DERMATITIS, UNSPECIFIED TYPE: ICD-10-CM

## 2019-08-28 RX ORDER — TRIAMCINOLONE ACETONIDE 1 MG/G
CREAM TOPICAL 2 TIMES DAILY
Qty: 15 G | Refills: 0 | Status: SHIPPED | OUTPATIENT
Start: 2019-08-28 | End: 2020-10-12 | Stop reason: SDUPTHER

## 2019-08-28 RX ORDER — CETIRIZINE HCL 10 MG
10 TABLET ORAL DAILY
Qty: 90 TAB | Refills: 3 | Status: SHIPPED | OUTPATIENT
Start: 2019-08-28 | End: 2020-10-12 | Stop reason: SDUPTHER

## 2019-08-28 NOTE — TELEPHONE ENCOUNTER
Requested Prescriptions     Pending Prescriptions Disp Refills    cetirizine (ZYRTEC) 10 mg tablet 90 Tab 3     Sig: Take 1 Tab by mouth daily. Patient stated that she also would like a refill on her itch cream. She stated that she did not know the name of it but stated it was 1%.

## 2019-09-08 DIAGNOSIS — R07.89 CHEST WALL PAIN: ICD-10-CM

## 2019-09-09 RX ORDER — DICLOFENAC SODIUM 10 MG/G
GEL TOPICAL
Qty: 100 G | Refills: 2 | Status: SHIPPED | OUTPATIENT
Start: 2019-09-09 | End: 2019-11-19 | Stop reason: SDUPTHER

## 2019-09-16 ENCOUNTER — TELEPHONE (OUTPATIENT)
Dept: FAMILY MEDICINE CLINIC | Age: 54
End: 2019-09-16

## 2019-09-16 DIAGNOSIS — M79.2 NEUROPATHIC PAIN: Primary | ICD-10-CM

## 2019-09-16 NOTE — TELEPHONE ENCOUNTER
Pt is calling requesting a refill on her med and she also states she is constipated and would like miralex called in as well    Requested Prescriptions     Pending Prescriptions Disp Refills    gabapentin (NEURONTIN) 300 mg capsule 270 Cap 3     Sig: Take 3 Caps by mouth three (3) times daily.

## 2019-09-16 NOTE — TELEPHONE ENCOUNTER
[address] faxed request for glycolax [phone] oscar    Dissolve 1 packet of powder in 8 oz water and drink by mouth once daily as directed for 30 days

## 2019-09-16 NOTE — TELEPHONE ENCOUNTER
Spoke to pt, verified name and . She stated that she no longer go to Dr. Lore Chávez, so she needs Dr. Sheila Winslow to fill her gabapentin. She stated that she is unable to come in for a visit. I informed pt I would ask provider. She verbalized understanding. Last office visit: 2019  Last refill: 2019   pulled, made a copy and put on Dr. [facility] desk.

## 2019-09-16 NOTE — TELEPHONE ENCOUNTER
[address] from [facility] calling to get orders that she will fax over signed for patient's Bariatric shower chair. She asks that Dr Jose Mckinney please sign this and fax back as soon as possible so patient can get a more heavy duty chair because of weight fluctuation. This was put in [facility] box at  by Sheyla Andree.

## 2019-09-17 RX ORDER — LIDOCAINE 50 MG/G
PATCH TOPICAL
Qty: 30 PATCH | Refills: 2 | Status: SHIPPED | OUTPATIENT
Start: 2019-09-17 | End: 2020-02-19 | Stop reason: SDUPTHER

## 2019-09-18 RX ORDER — GABAPENTIN 300 MG/1
900 CAPSULE ORAL 3 TIMES DAILY
Qty: 270 CAP | Refills: 3 | Status: SHIPPED | OUTPATIENT
Start: 2019-09-18 | End: 2019-12-26 | Stop reason: SDUPTHER

## 2019-09-19 ENCOUNTER — TELEPHONE (OUTPATIENT)
Dept: FAMILY MEDICINE CLINIC | Age: 54
End: 2019-09-19

## 2019-09-19 NOTE — TELEPHONE ENCOUNTER
Called gabapentin in to [address] [place] [address], paper copy destroyed. Called pt, verified name and . Notified her that gabapentin has been called in. She stated that she needs her miralax prescription refilled and she would like for Dr. Shari Baxter to forward the prescription for the shower chair to [facility].

## 2019-09-19 NOTE — TELEPHONE ENCOUNTER
Gabapentin and miralex called in to [facility], pharmacy says they do not have med requests. She also is seeing about chair for shower.  Please call patient at  [phone]

## 2019-09-20 NOTE — TELEPHONE ENCOUNTER
Pharmacy stated that pt picked up her gabapentin yesterday. Prescription for miralax and chair have been forwarded to Dr. Clare Belcher and will be taken care of on Monday.

## 2019-09-25 ENCOUNTER — TELEPHONE (OUTPATIENT)
Dept: FAMILY MEDICINE CLINIC | Age: 54
End: 2019-09-25

## 2019-09-25 NOTE — TELEPHONE ENCOUNTER
Pt is also wants to know why she hasn't heard back about miralax states she needs she is stopped up she is in [address] [facility] if you can't reach on cell

## 2019-09-27 RX ORDER — POLYETHYLENE GLYCOL 3350 17 G/17G
17 POWDER, FOR SOLUTION ORAL
Qty: 72 EACH | Refills: 2 | Status: SHIPPED | OUTPATIENT
Start: 2019-09-27 | End: 2021-12-01

## 2019-09-27 NOTE — TELEPHONE ENCOUNTER
MiraLAX prescription has been called in. We did receive a form for shower chair a week ago, this has been faxed.   If they have not received the paperwork please have them resend it

## 2019-09-27 NOTE — TELEPHONE ENCOUNTER
Unable to reach pt, left voicemail.   But wanted to make you aware that pt is calling for shower chair and Miralax RX

## 2019-10-09 ENCOUNTER — TELEPHONE (OUTPATIENT)
Dept: FAMILY MEDICINE CLINIC | Age: 54
End: 2019-10-09

## 2019-10-09 NOTE — TELEPHONE ENCOUNTER
If Keflex was going to be an effective antibiotic it would have worked by now. Unfortunately would need to know what urine culture results were to decide what the next antibiotic would be. The emergency room probably sent for culture and they would usually contact patient if antibiotic needed to be switched    So: Repeating Keflex is not going to be useful. She can either leave new urine sample for us to culture or ask emergency room for results    Also need to consider the possibility that her symptoms are not caused by UTI.   Would need to be seen and evaluated if that were the case

## 2019-10-09 NOTE — TELEPHONE ENCOUNTER
From envantonio:  Pt requesting a return call regarding the pt having a continued UTI Infection.  Pt was seen in the ER at [facility] and given an antibiotic (Keflex 500 mg) to treat. Pt is still having all the symptoms and would like another round to be sent to [address] at phone number [phone].

## 2019-11-19 ENCOUNTER — OFFICE VISIT (OUTPATIENT)
Dept: FAMILY MEDICINE CLINIC | Age: 54
End: 2019-11-19

## 2019-11-19 VITALS
HEIGHT: 65 IN | BODY MASS INDEX: 40.12 KG/M2 | SYSTOLIC BLOOD PRESSURE: 118 MMHG | WEIGHT: 240.8 LBS | OXYGEN SATURATION: 95 % | RESPIRATION RATE: 16 BRPM | HEART RATE: 85 BPM | DIASTOLIC BLOOD PRESSURE: 75 MMHG | TEMPERATURE: 97.9 F

## 2019-11-19 DIAGNOSIS — I10 ESSENTIAL HYPERTENSION: ICD-10-CM

## 2019-11-19 DIAGNOSIS — R26.9 GAIT ABNORMALITY: ICD-10-CM

## 2019-11-19 DIAGNOSIS — E11.21 TYPE 2 DIABETES WITH NEPHROPATHY (HCC): ICD-10-CM

## 2019-11-19 DIAGNOSIS — E11.40 TYPE 2 DIABETES MELLITUS WITH DIABETIC NEUROPATHY, WITH LONG-TERM CURRENT USE OF INSULIN (HCC): ICD-10-CM

## 2019-11-19 DIAGNOSIS — M79.18 MYOFASCIAL PAIN: ICD-10-CM

## 2019-11-19 DIAGNOSIS — R26.89 BALANCE PROBLEM: ICD-10-CM

## 2019-11-19 DIAGNOSIS — E78.1 HYPERTRIGLYCERIDEMIA: ICD-10-CM

## 2019-11-19 DIAGNOSIS — Z95.1 S/P CABG X 3: ICD-10-CM

## 2019-11-19 DIAGNOSIS — M62.89 PELVIC FLOOR DYSFUNCTION: ICD-10-CM

## 2019-11-19 DIAGNOSIS — R52 BODY ACHES: ICD-10-CM

## 2019-11-19 DIAGNOSIS — R30.9 URINARY PAIN: Primary | ICD-10-CM

## 2019-11-19 DIAGNOSIS — Z79.4 TYPE 2 DIABETES MELLITUS WITH DIABETIC NEUROPATHY, WITH LONG-TERM CURRENT USE OF INSULIN (HCC): ICD-10-CM

## 2019-11-19 DIAGNOSIS — M54.9 BACK PAIN, UNSPECIFIED BACK LOCATION, UNSPECIFIED BACK PAIN LATERALITY, UNSPECIFIED CHRONICITY: ICD-10-CM

## 2019-11-19 DIAGNOSIS — M21.371 RIGHT FOOT DROP: ICD-10-CM

## 2019-11-19 DIAGNOSIS — M79.2 NEUROPATHIC PAIN: ICD-10-CM

## 2019-11-19 RX ORDER — METFORMIN HYDROCHLORIDE 500 MG/1
500 TABLET, EXTENDED RELEASE ORAL DAILY
Refills: 11 | COMMUNITY
Start: 2019-08-20 | End: 2020-11-05 | Stop reason: SINTOL

## 2019-11-19 RX ORDER — DULAGLUTIDE 1.5 MG/.5ML
1.5 INJECTION, SOLUTION SUBCUTANEOUS
Refills: 11 | COMMUNITY
Start: 2019-11-10 | End: 2021-12-01

## 2019-11-19 RX ORDER — LATANOPROST 50 UG/ML
1 SOLUTION/ DROPS OPHTHALMIC DAILY
Refills: 11 | COMMUNITY
Start: 2019-10-31

## 2019-11-19 NOTE — PROGRESS NOTES
1. Have you been to the ER, urgent care clinic since your last visit? Hospitalized since your last visit? Yes, [facility] and [facility]    2. Have you seen or consulted any other health care providers outside of the [address] [place] [address] since your last visit? Include any pap smears or colon screening.  Yes, Neurology (Dr. Michael Guerrero)     Health Maintenance Due   Topic Date Due    Hepatitis C Screening  1965    Pneumococcal 0-64 years (1 of 1 - PPSV23) 08/12/1971    FOOT EXAM Q1  08/12/1975    EYE EXAM RETINAL OR DILATED  08/12/1975    DTaP/Tdap/Td series (1 - Tdap) 08/12/1986    PAP AKA CERVICAL CYTOLOGY  08/12/1986    Shingrix Vaccine Age 50> (1 of 2) 08/12/2015    BREAST CANCER SCRN MAMMOGRAM  08/12/2015    FOBT Q 1 YEAR AGE 50-75  08/12/2015    MICROALBUMIN Q1  07/10/2019    Influenza Age 9 to Adult  08/01/2019    LIPID PANEL Q1  08/22/2019    HEMOGLOBIN A1C Q6M  10/26/2019

## 2019-11-19 NOTE — PROGRESS NOTES
HPI Sharlynn Angelucci Day is a 47 y.o. female who presents to follow-up on recent medical issues. Has been in the hospital a few times in the recent months. These seem to stem from left-sided face pain, left throat and left ear pain. After extensive work-up it seems that treating it as a migraine has been most effective. Medicines did not work very well but Botox that she is been receiving for the last few weeks have resolved most of her symptoms. She did get a left temporal biopsy which was negative. Had an incidental thyroid nodule that has worked up and is now being followed by her endocrinologist.    Still having occasional left-sided throat and ear discomfort may be 2 or 3 days/month lasting for a day or 2 each time. She has glaucoma in her left eye and will be getting evaluated for cataract surgery in her left eye in January. She still experiencing full body pain. Cymbalta feels like a background medication and she is not sure how well it is working. Definitely notices when she is not taking the gabapentin though. Is taking 900 mg 3 times daily. Baclofen does not seem to help her very much. Describes stiffening up when she does not move around. She will have 2 or 3 days/week where she feels so bad she does not want to move and then it is a vicious cycle. This is happening every week. She has low back pain that occasionally radiates down her right leg. She has right foot drop. Her walker is broken, needs new wheels which limits her mobility. Her AFO brace for her right foot no longer fits. She has broken several shower chairs and needs a bariatric shower chair because of her weight    She is a more acute issue for the last month of urinary burning. Started in early October. Has been evaluated and treated for UTI 3 or 4 times. Antibiotics do not seem to be effective. Does appear to have bacteriuria. [facility] so do not have the record.   She also says that for many months she has had trouble getting a stream started and has to rock back and forth in order to get her urinary stream started      PMHx:  Past Medical History:   Diagnosis Date    Diabetes ([facility]     Fibula fracture     right    Hip fracture ([facility]     Myocardial infarct ([facility]        Meds:   Current Outpatient Medications   Medication Sig Dispense Refill    latanoprost (XALATAN) 0.005 % ophthalmic solution Apply 1 Drop to eye nightly. [address] Bariatric shower chair 1 Each 0    OTHER Right foot AFO 1 Each 0    OTHER Replacement wheels for Drive walker 1 Each 0    polyethylene glycol (MIRALAX) 17 gram packet Take 1 Packet by mouth two (2) times daily as needed (constipation). 72 Each 2    gabapentin (NEURONTIN) 300 mg capsule Take 3 Caps by mouth three (3) times daily. 270 Cap 3    lidocaine (LIDODERM) 5 % APPLY 1 PATCH TOPICALLY ONCE DAILY EVERY 12 HOURS 30 Patch 2    cetirizine (ZYRTEC) 10 mg tablet Take 1 Tab by mouth daily. 90 Tab 3    triamcinolone acetonide (KENALOG) 0.1 % topical cream Apply  to affected area two (2) times a day. use thin layer 15 g 0    DULoxetine (CYMBALTA) 60 mg capsule TAKE 1 CAPSULE BY MOUTH ONCE DAILY 90 Cap 3    ketoconazole 1 % sham Apply to wet hair, lather, and rinse thoroughly. Use every 3 to 4 days for up to 8 weeks 200 mL 1    diphenhydrAMINE (BENADRYL) 2 % topical cream Apply  to affected area three (3) times daily as needed for Skin Irritation. 30 g 0    diclofenac (VOLTAREN) 1 % gel Apply  to affected area four (4) times daily. 30 g 2    nitroglycerin (NITROSTAT) 0.4 mg SL tablet 1 Tab by SubLINGual route every five (5) minutes as needed for Chest Pain. Up to 3 doses. 30 Tab 1    CHILDREN'S FLONASE SENSIMIST 27.5 mcg/actuation nasal spray USE 2 SPRAYS BY NASAL   ONCE DAILY 10 g 11    clopidogrel (PLAVIX) 75 mg tab Take 1 Tab by mouth daily.  90 Tab 3    insulin lispro (HUMALOG U-100 INSULIN) 100 unit/mL injection 30 Units by SubCUTAneous route Before breakfast, lunch, and dinner. (Patient taking differently: 45 Units by SubCUTAneous route Before breakfast, lunch, and dinner.) 81 mL 3    fenofibrate (LOFIBRA) 160 mg tablet Take 1 Tab by mouth daily. 90 Tab 3    ondansetron (ZOFRAN ODT) 8 mg disintegrating tablet Take 1 Tab by mouth every eight (8) hours as needed for Nausea. 30 Tab 2    dicyclomine (BENTYL) 10 mg capsule Take 1 Cap by mouth three (3) times daily as needed. (Patient taking differently: Take 10 mg by mouth four (4) times daily.) 90 Cap 3    Walker misc Please dispense a rolling walkerIt M54.5, G89.29, R26.9 1 Each 0    aspirin 81 mg chewable tablet Take 325 mg by mouth daily.  pantoprazole (PROTONIX) 40 mg tablet Take 1 Tab by mouth daily. 90 Tab 3    Blood Pressure Monitor (BLOOD PRESSURE KIT) kit Check blood pressure daily 1 Kit 0    ONETOUCH ULTRA TEST strip       LANTUS 100 unit/mL injection 90 Units.  Insulin Syringe-Needle U-100 1 mL 31 x 5/16\" syrg   3    TRULICITY 1.5 EW/5.9 mL sub-q pen 1.5 mg by SubCUTAneous route every seven (7) days. 11    metFORMIN ER (GLUCOPHAGE XR) 500 mg tablet Take 500 mg by mouth daily. 11    rosuvastatin (CRESTOR) 20 mg tablet Take 1 Tab by mouth nightly. 90 Tab 3       Allergies:    Allergies   Allergen Reactions    Atorvastatin Other (comments)     Violent vomiting      Hydrocortisone Other (comments) and Unknown (comments)     Made wound worse when applied  Made wound worse when applied      Methocarbamol Other (comments) and Nausea and Vomiting    Bacitracin Other (comments) and Unknown (comments)    Celexa [Citalopram] Other (comments)    Diflucan [Fluconazole] Nausea and Vomiting    Flagyl [Metronidazole] Hives    Lisinopril Other (comments)     Low BP    Metformin Other (comments)    Metoclopramide Other (comments) and Unknown (comments)    Neosporin [Benzalkonium Chloride] Other (comments)    Pcn [Penicillins] Hives    Silver Rash     Silver tape    Sulfa (Sulfonamide Antibiotics) Hives       Smoker:  Social History     Tobacco Use   Smoking Status Former Smoker   Smokeless Tobacco Never Used       ETOH:   Social History     Substance and Sexual Activity   Alcohol Use No       FH:   Family History   Problem Relation Age of Onset    Cancer Mother     Diabetes Mother     Heart Disease Mother     Heart Disease Father     Diabetes Father        ROS:   As listed in HPI. In addition:  Constitutional:   No headache, fever, fatigue, weight loss or weight gain      Cardiac:    No chest pain      Resp:   No cough or shortness of breath      Neuro   No loss of consciousness, dizziness, seizures      Physical Exam:  Blood pressure 118/75, pulse 85, temperature 97.9 °F (36.6 °C), temperature source Oral, resp. rate 16, height 5' 5\" (1.651 m), weight 240 lb 12.8 oz (109.2 kg), last menstrual period 02/19/2011, SpO2 95 %. GEN: No apparent distress. Alert and oriented and responds to all questions appropriately. NEUROLOGIC:  No focal neurologic deficits. Strength and sensation grossly intact. Coordination and gait grossly intact. EXT: Well perfused. No edema. SKIN: No obvious rashes. Lungs clear to auscultation bilaterally  CV regular rate rhythm no murmur  HEENT clear TM clear nasal mucosa clear oromucosa       Assessment and Plan     Chronic pain, fibromyalgia, lumbar radiculopathy  Cymbalta 60 mg  Gabapentin 900 mg 3 times daily helpful  Baclofen not helpful, stop this medicine  Need to limit NSAID use given cardiac history  Cautioned strongly against opiate use  Refer for physical therapy  Get her mobility aids working again  Requires AFO for right foot drop. When she does not have her AFO she stumbles when her right toe catches. Requires a rolling walker to help with balance problem secondary to right foot drop. Requires shower chair because of balance problem to help with bathing. This needs to be a bariatric model because of her weight.   She has broken not bariatric chairs    Urinary pain, urinary hesitancy  Describing pelvic floor dysfunction. The actual urinary pain does not appear to correlate with bacteriuria and treatment for presumed urinary tract infection  No change in symptoms today. Will opt to not repeat futile antibiotic in this patient with a history of C. difficile  Refer to urogynecology    Diabetes  Managed by Dr. Karina Benson. Will reach out for records    This was a 40-minute visit. Greater than 50% of the time was spent counseling the patient on body pain, urinary pain. ICD-10-CM ICD-9-CM    1. Urinary pain R30.9 788.1 AMB POC URINALYSIS DIP STICK AUTO W/O MICRO      REFERRAL TO UROLOGY   2. Neuropathic pain M79.2 729.2    3. Type 2 diabetes mellitus with diabetic neuropathy, with long-term current use of insulin ([facility]) E11.40 250.60     Z79.4 357.2      V58.67    4. Type 2 diabetes with nephropathy ([facility]) E11.21 250.40      583.81    5. Myofascial pain M79.18 729.1    6. Back pain, unspecified back location, unspecified back pain laterality, unspecified chronicity M54.9 724.5 OTHER      REFERRAL TO PHYSICAL THERAPY   7. Body aches R52 780.96    8. Hypertriglyceridemia E78.1 272.1    9. Essential hypertension I10 401.9    10. S/P CABG x 3 Z95.1 V45.81    11. Gait abnormality R26.9 781.2 OTHER      OTHER      REFERRAL TO PHYSICAL THERAPY   12. Balance problem R26.89 781.99 OTHER      OTHER      REFERRAL TO PHYSICAL THERAPY   13. Right foot drop M21.371 736.79 OTHER      OTHER   14. Pelvic floor dysfunction M62.89 618.83 REFERRAL TO UROLOGY       AVS given.  Pt expressed understanding of instructions

## 2019-12-18 RX ORDER — DICYCLOMINE HYDROCHLORIDE 10 MG/1
CAPSULE ORAL
Qty: 90 CAP | Refills: 3 | Status: SHIPPED | OUTPATIENT
Start: 2019-12-18 | End: 2020-11-05

## 2019-12-26 ENCOUNTER — TELEPHONE (OUTPATIENT)
Dept: FAMILY MEDICINE CLINIC | Age: 54
End: 2019-12-26

## 2019-12-26 DIAGNOSIS — M79.2 NEUROPATHIC PAIN: ICD-10-CM

## 2019-12-26 RX ORDER — GABAPENTIN 300 MG/1
900 CAPSULE ORAL 3 TIMES DAILY
Qty: 270 CAP | Refills: 0 | Status: SHIPPED | OUTPATIENT
Start: 2019-12-26 | End: 2020-03-09

## 2019-12-26 NOTE — TELEPHONE ENCOUNTER
Patient needs a refill on her because of surgery coming up gabapentin (NEURONTIN) 300 mg capsule    Call back at [phone]     Pharmacy: [facility]

## 2020-01-14 RX ORDER — DULOXETIN HYDROCHLORIDE 60 MG/1
CAPSULE, DELAYED RELEASE ORAL
Qty: 90 CAP | Refills: 3 | Status: SHIPPED | OUTPATIENT
Start: 2020-01-14 | End: 2020-04-02 | Stop reason: SDUPTHER

## 2020-02-17 ENCOUNTER — TELEPHONE (OUTPATIENT)
Dept: FAMILY MEDICINE CLINIC | Age: 55
End: 2020-02-17

## 2020-02-17 DIAGNOSIS — J45.20 MILD INTERMITTENT ASTHMA WITHOUT COMPLICATION: Primary | ICD-10-CM

## 2020-02-17 RX ORDER — ALBUTEROL SULFATE 0.83 MG/ML
2.5 SOLUTION RESPIRATORY (INHALATION)
Qty: 40 EACH | Refills: 2 | Status: SHIPPED | OUTPATIENT
Start: 2020-02-17 | End: 2020-02-27 | Stop reason: SDUPTHER

## 2020-02-17 RX ORDER — NEBULIZER AND COMPRESSOR
1 EACH MISCELLANEOUS
Qty: 1 EACH | Refills: 0 | Status: SHIPPED | OUTPATIENT
Start: 2020-02-17

## 2020-02-17 NOTE — TELEPHONE ENCOUNTER
Pt is calling stating nebulizer went to pharmacy and they don't carry them needs to do a medical company would like a call back

## 2020-02-17 NOTE — TELEPHONE ENCOUNTER
Left message for pt that Albuterol and nebulizer machine were sent to [facility].  Informed her that pharmacy may not have the nebulizer machine, so she may need to go to a medical device supplier or [employer]. If she had any further questions, she could call us back.

## 2020-02-17 NOTE — TELEPHONE ENCOUNTER
Patient was diagnosed with type B flu. She saw Alvarado Min [phone]. He prescribed albuterol, benzonatate, macrobiz and tamiflu. She is having trouble with the albuterol and is requesting a nebulizer instead.   Please assist.    [facility] in [place]

## 2020-02-17 NOTE — TELEPHONE ENCOUNTER
Albuterol and nebulizer machine to [facility].  Pharmacy may not have the nebulizer machine.   May need to go to a medical device supplier or [employer]

## 2020-02-17 NOTE — TELEPHONE ENCOUNTER
Called pt, verified name and . Informed pt that per Dr. Enedina Kirkland she may have to find a med supply store that has this. I called [facility] and they have a nebulizer machine and requested we fax them notes and rx. Faxed information and notified pt. She stated understanding.

## 2020-02-18 DIAGNOSIS — M79.2 NEUROPATHIC PAIN: ICD-10-CM

## 2020-02-18 RX ORDER — GABAPENTIN 300 MG/1
CAPSULE ORAL
Qty: 270 CAP | Refills: 0 | OUTPATIENT
Start: 2020-02-18

## 2020-02-19 RX ORDER — LIDOCAINE 50 MG/G
PATCH TOPICAL
Qty: 30 PATCH | Refills: 2 | Status: SHIPPED | OUTPATIENT
Start: 2020-02-19 | End: 2020-10-15 | Stop reason: SDUPTHER

## 2020-02-19 NOTE — TELEPHONE ENCOUNTER
Requested Prescriptions     Pending Prescriptions Disp Refills    lidocaine (LIDODERM) 5 % 30 Patch 2     Sig: Apply patch to the affected area for 12 hours a day and remove for 12 hours a day.      Last Office Visit:  11.19.2019

## 2020-02-27 DIAGNOSIS — J45.20 MILD INTERMITTENT ASTHMA WITHOUT COMPLICATION: ICD-10-CM

## 2020-02-27 RX ORDER — ALBUTEROL SULFATE 0.83 MG/ML
2.5 SOLUTION RESPIRATORY (INHALATION)
Qty: 40 EACH | Refills: 2 | Status: SHIPPED | OUTPATIENT
Start: 2020-02-27 | End: 2020-10-15 | Stop reason: SDUPTHER

## 2020-02-27 NOTE — TELEPHONE ENCOUNTER
Requested Prescriptions     Pending Prescriptions Disp Refills    albuterol (PROVENTIL VENTOLIN) 2.5 mg /3 mL (0.083 %) nebu 40 Each 2     Sig: 3 mL by Nebulization route every four (4) hours as needed for Wheezing.      [facility] in [place]

## 2020-03-08 DIAGNOSIS — M79.2 NEUROPATHIC PAIN: ICD-10-CM

## 2020-03-09 RX ORDER — GABAPENTIN 300 MG/1
CAPSULE ORAL
Qty: 270 CAP | Refills: 0 | Status: SHIPPED | OUTPATIENT
Start: 2020-03-09 | End: 2020-04-02 | Stop reason: SDUPTHER

## 2020-03-09 NOTE — TELEPHONE ENCOUNTER
Gabapentin prescription would not escribe.  Got a \"controlled substance escribe turned off at this facility\" error message    Please phone in

## 2020-03-23 ENCOUNTER — TELEPHONE (OUTPATIENT)
Dept: FAMILY MEDICINE CLINIC | Age: 55
End: 2020-03-23

## 2020-03-23 NOTE — TELEPHONE ENCOUNTER
Pt is calling wanting to set up for virtual apt I gave her the info to set my chart up she states she is going through depression and she is stating she has been sick for over a month and was told that she has flu B and she is having a low fever now

## 2020-03-24 ENCOUNTER — VIRTUAL VISIT (OUTPATIENT)
Dept: FAMILY MEDICINE CLINIC | Age: 55
End: 2020-03-24

## 2020-03-24 DIAGNOSIS — Z95.1 S/P CABG X 3: ICD-10-CM

## 2020-03-24 DIAGNOSIS — E66.01 OBESITY, MORBID (HCC): ICD-10-CM

## 2020-03-24 DIAGNOSIS — F41.9 ANXIETY: Primary | ICD-10-CM

## 2020-03-24 DIAGNOSIS — R11.0 NAUSEA: ICD-10-CM

## 2020-03-24 DIAGNOSIS — I25.10 CORONARY ARTERY DISEASE INVOLVING NATIVE HEART WITHOUT ANGINA PECTORIS, UNSPECIFIED VESSEL OR LESION TYPE: ICD-10-CM

## 2020-03-24 DIAGNOSIS — K21.9 GASTROESOPHAGEAL REFLUX DISEASE WITHOUT ESOPHAGITIS: ICD-10-CM

## 2020-03-24 RX ORDER — DIPHENHYDRAMINE HCL 25 MG
25 CAPSULE ORAL
COMMUNITY
Start: 2020-02-17

## 2020-03-24 NOTE — PROGRESS NOTES
THIS VISIT WAS COMPLETED VIRTUALLY USING Doxy. me and telephone    HPI  Franca Stovall is a 47 y.o. female who complains of anxiety, chest pain, nausea. We started visit on doxy. me, a video chat platform but due to audio issues converted to telephone call after the initial visual assessment. Received lying in bed, look to be in good spirits, good color. Speaking in clear full sentences. Primary complaint today is anxiety surrounding the Covid 19 pandemic. She has worked out from news reports that her various health conditions put her in the high risk group. She is worried about mortality. She feels that she cannot escape the thought and that there is nowhere to turn to be cheered. She is having moments where she is crying for no reason. She has had 30-minute episodes where she feels that she is choking or has a bubble in her lungs that makes breathing difficult. She is depressed with fatigue but is not sure that is a new problem. She is taking Cymbalta 60 mg twice daily chronically and gabapentin 900 mg 3 times daily chronically. She thinks she is on enough medicine for anxiety. She is taking steps to keep her self safe, she is staying at home. She is social distancing. She is making minimal trips out for necessities like going to the grocery store. She has not tried counseling yet. She is experiencing some chest pain. It does not appear to be a cardiac type pain. It happens primarily at night when she is lying down so it is more likely to be her rib pain from her sternotomy or reflux. She also has nausea since her bout with influenza in February. The nausea gets a little worse just before eating when she is hungry. Gets marginally better when she eats and then gets worse 10-15 minutes later suggesting reflux is the more likely culprit. She has Tums on hand but has not tried any. She has been warned off PPIs because of Plavix.         I see through care everywhere that she was seen in the emergency room 2/20. At that time she was complaining of myalgias that seem to be due to influenza diagnosed at urgent care 4 days prior. Testing positive for influenza B at that time. She then went to the emergency room complaining of generalized myalgias and cramping. Some dizziness on standing. It was the impression of the emergency physician that she was dehydrated from diarrhea for several days. She had some testing done which showed a decrease in her GFR to 58 from a baseline of above 60. Normal WBC. Hemoglobin 13.4. She was discharged to manage symptoms at home    PMHx:  Past Medical History:   Diagnosis Date    [facility]     Fibula fracture     right    Hip fracture ([facility]     Myocardial infarct ([facility] [address]        Meds:   Current Outpatient Medications   Medication Sig Dispense Refill    gabapentin (NEURONTIN) 300 mg capsule TAKE 3 CAPSULES BY MOUTH THREE TIMES DAILY 270 Cap 0    albuterol (PROVENTIL VENTOLIN) 2.5 mg /3 mL (0.083 %) nebu 3 mL by Nebulization route every four (4) hours as needed for Wheezing. 40 Each 2    lidocaine (LIDODERM) 5 % APPLY 1 PATCH TOPICALLY ONCE DAILY EVERY 12 HOURS (Patient taking differently: 1 Patch by TransDERmal route every twelve (12) hours as needed. APPLY 1 PATCH TOPICALLY ONCE DAILY EVERY 12 HOURS) 30 Patch 2    Nebulizer & Compressor machine 1 Each by Does Not Apply route every four (4) hours as needed for Wheezing. 1 Each 0    DULoxetine (CYMBALTA) 60 mg capsule TAKE 1 CAPSULE BY MOUTH ONCE DAILY (Patient taking differently: 60 mg two (2) times a day.) 90 Cap 3    dicyclomine (BENTYL) 10 mg capsule TAKE 1 CAPSULE BY MOUTH THREE TIMES DAILY AS NEEDED 90 Cap 3    latanoprost (XALATAN) 0.005 % ophthalmic solution Apply 1 Drop to eye daily.   [address] Bariatric shower chair 1 Each 0    OTHER Right foot AFO 1 Each 0    OTHER Replacement wheels for Drive walker 1 Each 0    TRULICITY 1.5 HD/2.8 mL sub-q pen 1.5 mg by SubCUTAneous route every seven (7) days. 11    metFORMIN ER (GLUCOPHAGE XR) 500 mg tablet Take 500 mg by mouth daily. 11    polyethylene glycol (MIRALAX) 17 gram packet Take 1 Packet by mouth two (2) times daily as needed (constipation). 72 Each 2    cetirizine (ZYRTEC) 10 mg tablet Take 1 Tab by mouth daily. (Patient taking differently: Take 10 mg by mouth daily as needed.) 90 Tab 3    triamcinolone acetonide (KENALOG) 0.1 % topical cream Apply  to affected area two (2) times a day. use thin layer 15 g 0    diclofenac (VOLTAREN) 1 % gel Apply  to affected area four (4) times daily. 30 g 2    nitroglycerin (NITROSTAT) 0.4 mg SL tablet 1 Tab by SubLINGual route every five (5) minutes as needed for Chest Pain. Up to 3 doses. 30 Tab 1    CHILDREN'S FLONASE SENSIMIST 27.5 mcg/actuation nasal spray USE 2 SPRAYS BY NASAL   ONCE DAILY (Patient taking differently: daily as needed.) 10 g 11    clopidogrel (PLAVIX) 75 mg tab Take 1 Tab by mouth daily. 90 Tab 3    insulin lispro (HUMALOG U-100 INSULIN) 100 unit/mL injection 30 Units by SubCUTAneous route Before breakfast, lunch, and dinner. (Patient taking differently: 45 Units by SubCUTAneous route Before breakfast, lunch, and dinner.) 81 mL 3    ondansetron (ZOFRAN ODT) 8 mg disintegrating tablet Take 1 Tab by mouth every eight (8) hours as needed for Nausea. 27 Tab 2    Walker misc Please dispense a rolling walkerIt M54.5, G89.29, R26.9 1 Each 0    aspirin 81 mg chewable tablet Take 325 mg by mouth daily.  pantoprazole (PROTONIX) 40 mg tablet Take 1 Tab by mouth daily. 90 Tab 3    Blood Pressure Monitor (BLOOD PRESSURE KIT) kit Check blood pressure daily 1 Kit 0    ONETOUCH ULTRA TEST strip       LANTUS 100 unit/mL injection 90 Units.  Insulin Syringe-Needle U-100 1 mL 31 x 5/16\" syrg   3    diphenhydrAMINE (BENADRYL) 25 mg capsule Take 25 mg by mouth every six (6) hours as needed.  ketoconazole 1 % sham Apply to wet hair, lather, and rinse thoroughly.  Use every 3 to 4 days for up to 8 weeks 200 mL 1    diphenhydrAMINE (BENADRYL) 2 % topical cream Apply  to affected area three (3) times daily as needed for Skin Irritation. 30 g 0    fenofibrate (LOFIBRA) 160 mg tablet Take 1 Tab by mouth daily. 90 Tab 3    rosuvastatin (CRESTOR) 20 mg tablet Take 1 Tab by mouth nightly. 90 Tab 3       Allergies: Allergies   Allergen Reactions    Atorvastatin Other (comments)     Violent vomiting      Hydrocortisone Other (comments) and Unknown (comments)     Made wound worse when applied  Made wound worse when applied      Methocarbamol Other (comments) and Nausea and Vomiting    Bacitracin Other (comments) and Unknown (comments)    Celexa [Citalopram] Other (comments)    Diflucan [Fluconazole] Nausea and Vomiting    Flagyl [Metronidazole] Hives    Lisinopril Other (comments)     Low BP    Metformin Other (comments)    Metoclopramide Other (comments) and Unknown (comments)    Neomycin-Bacitracnzn-Polymyxnb Unknown (comments)    Neosporin [Benzalkonium Chloride] Other (comments)    Pcn [Penicillins] Hives    Silver Rash     Silver tape    Sulfa (Sulfonamide Antibiotics) Hives    Sulfamethoxazole-Trimethoprim Unknown (comments)       Smoker:  Social History     Tobacco Use   Smoking Status Former Smoker   Smokeless Tobacco Never Used       ETOH:   Social History     Substance and Sexual Activity   Alcohol Use No       FH:   Family History   Problem Relation Age of Onset    Cancer Mother     Diabetes Mother     Heart Disease Mother     Heart Disease Father     Diabetes Father        ROS:   As listed in HPI. In addition:  Constitutional:   No headache, fever, fatigue, weight loss or weight gain      Cardiac:    No chest pain      Resp:   No cough or shortness of breath      Neuro   No loss of consciousness, dizziness, seizures      Physical Exam:  Last menstrual period 02/19/2011. GEN: No apparent distress. Alert and oriented and responds to all questions appropriately. NEUROLOGIC:  No focal neurologic deficits. Coordination and gait grossly intact. EXT: Well perfused. No edema. SKIN: No obvious rashes. Due to this being a TeleHealth evaluation, many elements of the physical examination are unable to be assessed. Assessment and Plan     Anxiety  She is taking enough anxiety medication  Cymbalta 60 mg twice daily  Gabapentin 900 mg 3 times daily  What she is anxious about it is reasonable and the kind of thing that anybody would be anxious about. I suggested counseling. I pointed her in the direction of virtual counseling platform [employer]. She sounded very interested and will give that a try. Chest pain  Does not sound cardiac  Several recent emergency room visits have ruled out cardiac cause of similar type chest pain  Modified by food suggesting reflux. I suggest she try Tums to see if that helps and to upgrade to famotidine if she gets some relief  May be rib type pain. Heating pad for that    Nausea   pattern most suspicious for reflux versus gastroparesis versus constipation type pattern  See if famotidine helps    This was a 40-minute visit. Greater than 50% of the time was spent counseling the patient on anxiety chest pain and nausea. ICD-10-CM ICD-9-CM    1. Coronary artery disease involving native heart without angina pectoris, unspecified vessel or lesion type I25.10 414.01    2. Obesity, morbid ([facility] [address] E66.01 278.01    3. S/P CABG x 3 Z95.1 V45.81    4. Anxiety F41.9 300.00    5. Nausea R11.0 787.02    6.  Gastroesophageal reflux disease without esophagitis K21.9 530.81          Pursuant to the emergency declaration under the [facility], [facility] waiver authority and the [employer] and [employer] Act, this Virtual  Visit was conducted, with patient's consent, to reduce the patient's risk of exposure to COVID-19 and provide continuity of care for an established patient. Services were provided through a video synchronous discussion virtually to substitute for in-person clinic visit.

## 2020-04-02 DIAGNOSIS — M79.2 NEUROPATHIC PAIN: ICD-10-CM

## 2020-04-03 RX ORDER — DULOXETIN HYDROCHLORIDE 60 MG/1
CAPSULE, DELAYED RELEASE ORAL
Qty: 90 CAP | Refills: 3 | Status: SHIPPED | OUTPATIENT
Start: 2020-04-03 | End: 2021-05-10

## 2020-04-03 RX ORDER — GABAPENTIN 300 MG/1
CAPSULE ORAL
Qty: 270 CAP | Refills: 0 | Status: SHIPPED | OUTPATIENT
Start: 2020-04-03 | End: 2020-05-21

## 2020-04-03 RX ORDER — FENOFIBRATE 160 MG/1
160 TABLET ORAL DAILY
Qty: 90 TAB | Refills: 3 | Status: SHIPPED | OUTPATIENT
Start: 2020-04-03 | End: 2022-04-13

## 2020-04-16 ENCOUNTER — VIRTUAL VISIT (OUTPATIENT)
Dept: FAMILY MEDICINE CLINIC | Age: 55
End: 2020-04-16

## 2020-04-16 VITALS — WEIGHT: 240 LBS | HEIGHT: 65 IN | BODY MASS INDEX: 39.99 KG/M2

## 2020-04-16 DIAGNOSIS — S76.311A STRAIN OF RIGHT PIRIFORMIS MUSCLE, INITIAL ENCOUNTER: Primary | ICD-10-CM

## 2020-04-16 DIAGNOSIS — M79.2 NEUROPATHIC PAIN: ICD-10-CM

## 2020-04-16 DIAGNOSIS — S76.311A STRAIN OF RIGHT HAMSTRING, INITIAL ENCOUNTER: ICD-10-CM

## 2020-04-16 NOTE — PROGRESS NOTES
Chief Complaint   Patient presents with    Leg Pain     due to slip in bathroom      1. Have you been to the ER, urgent care clinic since your last visit? Hospitalized since your last visit? No    2. Have you seen or consulted any other health care providers outside of the [address] [place] [address] since your last visit? Include any pap smears or colon screening.  No    Health Maintenance Due   Topic Date Due    Hepatitis C Screening  1965    Pneumococcal 0-64 years (1 of 1 - PPSV23) 08/12/1971    Foot Exam Q1  08/12/1975    A1C test (Diabetic or Prediabetic)  08/12/1975    Eye Exam Retinal or Dilated  08/12/1975    DTaP/Tdap/Td series (1 - Tdap) 08/12/1986    PAP AKA CERVICAL CYTOLOGY  08/12/1986    Shingrix Vaccine Age 50> (1 of 2) 08/12/2015    FOBT Q1Y Age 50-75  08/12/2015    MICROALBUMIN Q1  07/10/2019

## 2020-04-16 NOTE — PROGRESS NOTES
THIS VISIT WAS COMPLETED VIRTUALLY USING DOXY. ANDRES TOLEDO  Gus Stovall is a 47 y.o. female who presents with leg pain that started 4 days ago on Sunday. She was sitting on her shower chair and slipped a little bit and felt a popping sensation in the meat of her right buttock. Was able to walk to bed and woke up the next morning stiff from the buttock to the hamstring to the midcalf. The motion of standing and sitting are particularly problematic. Walking might make it worse or it might stay about the same. She made an effort to walk back and forth in the house to try to loosen it up and did notice a little bit of improvement but stiffened up again shortly after sitting down so has not been getting too much exercise for it. She is try to stretch out her hamstring but it is hard for her to tolerate the stretch. Tylenol helps a little bit. Heating pad helps a little bit    PMHx:  Past Medical History:   Diagnosis Date    Diabetes ([facility] [address]     Fibula fracture     right    [facility])     Myocardial infarct ([facility])        Meds:   Current Outpatient Medications   Medication Sig Dispense Refill    fenofibrate (LOFIBRA) 160 mg tablet Take 1 Tab by mouth daily. 90 Tab 3    gabapentin (NEURONTIN) 300 mg capsule TAKE 3 CAPSULES BY MOUTH THREE TIMES DAILY 270 Cap 0    DULoxetine (CYMBALTA) 60 mg capsule TAKE 1 CAPSULE BY MOUTH ONCE DAILY 90 Cap 3    diphenhydrAMINE (BENADRYL) 25 mg capsule Take 25 mg by mouth every six (6) hours as needed.  albuterol (PROVENTIL VENTOLIN) 2.5 mg /3 mL (0.083 %) nebu 3 mL by Nebulization route every four (4) hours as needed for Wheezing. 40 Each 2    lidocaine (LIDODERM) 5 % APPLY 1 PATCH TOPICALLY ONCE DAILY EVERY 12 HOURS (Patient taking differently: 1 Patch by TransDERmal route every twelve (12) hours as needed.  APPLY 1 PATCH TOPICALLY ONCE DAILY EVERY 12 HOURS) 30 Patch 2    Nebulizer & Compressor machine 1 Each by Does Not Apply route every four (4) hours as needed for Wheezing. 1 Each 0    dicyclomine (BENTYL) 10 mg capsule TAKE 1 CAPSULE BY MOUTH THREE TIMES DAILY AS NEEDED 90 Cap 3    latanoprost (XALATAN) 0.005 % ophthalmic solution Apply 1 Drop to eye daily. [address] Bariatric shower chair 1 Each 0    OTHER Right foot AFO 1 Each 0    OTHER Replacement wheels for Drive walker 1 Each 0    TRULICITY 1.5 RO/8.5 mL sub-q pen 1.5 mg by SubCUTAneous route every seven (7) days. 11    metFORMIN ER (GLUCOPHAGE XR) 500 mg tablet Take 500 mg by mouth daily. 11    polyethylene glycol (MIRALAX) 17 gram packet Take 1 Packet by mouth two (2) times daily as needed (constipation). 72 Each 2    cetirizine (ZYRTEC) 10 mg tablet Take 1 Tab by mouth daily. (Patient taking differently: Take 10 mg by mouth daily as needed.) 90 Tab 3    triamcinolone acetonide (KENALOG) 0.1 % topical cream Apply  to affected area two (2) times a day. use thin layer 15 g 0    ketoconazole 1 % sham Apply to wet hair, lather, and rinse thoroughly. Use every 3 to 4 days for up to 8 weeks 200 mL 1    diphenhydrAMINE (BENADRYL) 2 % topical cream Apply  to affected area three (3) times daily as needed for Skin Irritation. 30 g 0    diclofenac (VOLTAREN) 1 % gel Apply  to affected area four (4) times daily. 30 g 2    nitroglycerin (NITROSTAT) 0.4 mg SL tablet 1 Tab by SubLINGual route every five (5) minutes as needed for Chest Pain. Up to 3 doses. 30 Tab 1    CHILDREN'S FLONASE SENSIMIST 27.5 mcg/actuation nasal spray USE 2 SPRAYS BY NASAL   ONCE DAILY (Patient taking differently: daily as needed.) 10 g 11    clopidogrel (PLAVIX) 75 mg tab Take 1 Tab by mouth daily. 90 Tab 3    insulin lispro (HUMALOG U-100 INSULIN) 100 unit/mL injection 30 Units by SubCUTAneous route Before breakfast, lunch, and dinner.  (Patient taking differently: 45 Units by SubCUTAneous route Before breakfast, lunch, and dinner.) 81 mL 3    ondansetron (ZOFRAN ODT) 8 mg disintegrating tablet Take 1 Tab by mouth every eight (8) hours as needed for Nausea. 27 Tab 2    Walker misc Please dispense a rolling walkerIt M54.5, G89.29, R26.9 1 Each 0    aspirin 81 mg chewable tablet Take 325 mg by mouth daily.  pantoprazole (PROTONIX) 40 mg tablet Take 1 Tab by mouth daily. 90 Tab 3    rosuvastatin (CRESTOR) 20 mg tablet Take 1 Tab by mouth nightly. 90 Tab 3    Blood Pressure Monitor (BLOOD PRESSURE KIT) kit Check blood pressure daily 1 Kit 0    ONETOUCH ULTRA TEST strip       LANTUS 100 unit/mL injection 90 Units.  Insulin Syringe-Needle U-100 1 mL 31 x 5/16\" syrg   3       Allergies: Allergies   Allergen Reactions    Atorvastatin Other (comments)     Violent vomiting      Hydrocortisone Other (comments) and Unknown (comments)     Made wound worse when applied  Made wound worse when applied      Methocarbamol Other (comments) and Nausea and Vomiting    Bacitracin Other (comments) and Unknown (comments)    Celexa [Citalopram] Other (comments)    Diflucan [Fluconazole] Nausea and Vomiting    Flagyl [Metronidazole] Hives    Lisinopril Other (comments)     Low BP    Metformin Other (comments)    Metoclopramide Other (comments) and Unknown (comments)    Neomycin-Bacitracnzn-Polymyxnb Unknown (comments)    Neosporin [Benzalkonium Chloride] Other (comments)    Pcn [Penicillins] Hives    Silver Rash     Silver tape    Sulfa (Sulfonamide Antibiotics) Hives    Sulfamethoxazole-Trimethoprim Unknown (comments)       Smoker:  Social History     Tobacco Use   Smoking Status Former Smoker   Smokeless Tobacco Never Used       ETOH:   Social History     Substance and Sexual Activity   Alcohol Use No       FH:   Family History   Problem Relation Age of Onset    Cancer Mother     Diabetes Mother     Heart Disease Mother     Heart Disease Father     Diabetes Father        ROS:   As listed in HPI.  In addition:  Constitutional:   No headache, fever, fatigue, weight loss or weight gain      Cardiac:    No chest pain      Resp: No cough or shortness of breath      Neuro   No loss of consciousness, dizziness, seizures      Physical Exam:  Height 5' 5\" (1.651 m), weight 240 lb (108.9 kg), last menstrual period 02/19/2011. GEN: No apparent distress. Alert and oriented and responds to all questions appropriately. NEUROLOGIC:  No focal neurologic deficits. Coordination and gait grossly intact. EXT: Well perfused. No edema. SKIN: No obvious rashes. Pain with figure 4 stretch. Slight pain in the hamstring with leg extension and dorsiflexion of the foot    Due to this being a TeleHealth evaluation, many elements of the physical examination are unable to be assessed. Assessment and Plan     Piriformis strain, hamstring strain  Felt a popping sensation. The fact that she was able to walk without difficulty immediately after the injury and stiffened up overnight suggests muscle strain. Based on the stretches that caused pain that I guided her through during this visit I suspect the primary issue is piriformis. Try to describe stretches that she can do for this but she might find it most useful to walk to loosen this up. Would benefit from Tylenol, heating pad, muscle cream.    She has Flexeril 10 mg handy but has not tried taking it. She might find this useful because rolling over at night wakes her up. Would not be surprised if this takes a week to improve      ICD-10-CM ICD-9-CM    1. Strain of right piriformis muscle, initial encounter S76.311A 843.8    2. Strain of right hamstring, initial encounter S76.311A 843.8    3. Neuropathic pain M79.2 729.2          Pursuant to the emergency declaration under the [facility] waiver authority and the [employer] Act, this Virtual  Visit was conducted, with patient's consent, to reduce the patient's risk of exposure to COVID-19 and provide continuity of care for an established patient. Services were provided through a video synchronous discussion virtually to substitute for in-person clinic visit.

## 2020-04-22 ENCOUNTER — VIRTUAL VISIT (OUTPATIENT)
Dept: FAMILY MEDICINE CLINIC | Age: 55
End: 2020-04-22

## 2020-04-22 VITALS — WEIGHT: 240 LBS | HEIGHT: 65 IN | BODY MASS INDEX: 39.99 KG/M2

## 2020-04-22 DIAGNOSIS — T78.40XA ALLERGIC REACTION, INITIAL ENCOUNTER: Primary | ICD-10-CM

## 2020-04-22 RX ORDER — NITROFURANTOIN 25; 75 MG/1; MG/1
100 CAPSULE ORAL 2 TIMES DAILY
COMMUNITY
End: 2020-05-01

## 2020-04-22 RX ORDER — HYDROCHLOROTHIAZIDE 25 MG/1
25 TABLET ORAL DAILY
COMMUNITY
End: 2020-11-05

## 2020-04-22 RX ORDER — PREDNISONE 20 MG/1
40 TABLET ORAL
Qty: 14 TAB | Refills: 0 | Status: SHIPPED | OUTPATIENT
Start: 2020-04-22 | End: 2020-04-29

## 2020-04-22 NOTE — PROGRESS NOTES
[facility] Day is a 47 y.o. female evaluated via telephone on 4/22/2020. Consent:  She and/or health care decision maker is aware that that she may receive a bill for this telephone service, depending on her insurance coverage, and has provided verbal consent to proceed: Yes      Documentation:    Patient started experiencing what she tract infection symptoms this morning and took some Macrobid that she had leftover from an illness about a month ago. She thinks that she only took 1 or 2 of these pills but stopped taking them because she felt that most of her symptoms were actually due to flu rather than a infection. He does not believe she has ever taken Macrobid before in her life . About 2 hours after she took medicine this morning she started developing itching and sense of skin itching and heat. She decided to take a shower to see if that gives her some relief . when she stepped out of the shower and saw herself in the mirror she noticed that she was red all over and had splotchy hives developing on her chest and arms. There was a little excoriation on her arms from where she has been scratching but otherwise no skin peeling       She has tried some Benadryl 30 minutes ago has not seen any relief yet. She has Zyrtec on hand but has not tried it. Sounds like a histamine based allergic reaction. Does not sound like Maya-Edgar syndrome but explained symptoms that she should be look out for like blistering and skin sloughing should bring her to the emergency room    Has experienced a similar reaction to Flagyl and thought that she might be able to wait out this reaction. We agreed that I would call in prednisone which she may or may not take as she does get side effects from prednisone       I affirm this is a Patient Initiated Episode with an Established Patient who has not had a related appointment within my department in the past 7 days or scheduled within the next 24 hours.     Total Time: minutes: 11-20 minutes    Note: not billable if this call serves to triage the patient into an appointment for the relevant concern      Kareen Rojas MD

## 2020-04-22 NOTE — PROGRESS NOTES
Chief Complaint   Patient presents with    Allergic Reaction     itchy, hives, arms, chest & face        1. Have you been to the ER, urgent care clinic since your last visit? Hospitalized since your last visit? No    2. Have you seen or consulted any other health care providers outside of the [address] [place] [address] since your last visit? Include any pap smears or colon screening.  No    Health Maintenance Due   Topic Date Due    Hepatitis C Screening  1965    Pneumococcal 0-64 years (1 of 1 - PPSV23) 08/12/1971    Foot Exam Q1  08/12/1975    A1C test (Diabetic or Prediabetic)  08/12/1975    Eye Exam Retinal or Dilated  08/12/1975    DTaP/Tdap/Td series (1 - Tdap) 08/12/1986    PAP AKA CERVICAL CYTOLOGY  08/12/1986    Shingrix Vaccine Age 50> (1 of 2) 08/12/2015    FOBT Q1Y Age 50-75  08/12/2015    MICROALBUMIN Q1  07/10/2019

## 2020-04-23 ENCOUNTER — TELEPHONE (OUTPATIENT)
Dept: FAMILY MEDICINE CLINIC | Age: 55
End: 2020-04-23

## 2020-04-23 NOTE — TELEPHONE ENCOUNTER
Pt is in hospital states she was having an allergic reaction not sure what all she was saying because phone was breaking up I believe she is in [place] I think she said [facility] her phone has dropped twice

## 2020-04-24 ENCOUNTER — TELEPHONE (OUTPATIENT)
Dept: FAMILY MEDICINE CLINIC | Age: 55
End: 2020-04-24

## 2020-04-24 NOTE — TELEPHONE ENCOUNTER
Pt is calling stating she was suppose to have had labs done yesterday and she is in the hospital and she will do her labs when she gets out the hospital states she was admitted for an allergic reaction

## 2020-05-01 ENCOUNTER — VIRTUAL VISIT (OUTPATIENT)
Dept: FAMILY MEDICINE CLINIC | Age: 55
End: 2020-05-01

## 2020-05-01 VITALS — WEIGHT: 232 LBS | HEIGHT: 65 IN | BODY MASS INDEX: 38.65 KG/M2

## 2020-05-01 DIAGNOSIS — E11.40 TYPE 2 DIABETES MELLITUS WITH DIABETIC NEUROPATHY, WITH LONG-TERM CURRENT USE OF INSULIN (HCC): Primary | ICD-10-CM

## 2020-05-01 DIAGNOSIS — Z79.4 TYPE 2 DIABETES MELLITUS WITH COMPLICATION, WITH LONG-TERM CURRENT USE OF INSULIN (HCC): ICD-10-CM

## 2020-05-01 DIAGNOSIS — E11.8 TYPE 2 DIABETES MELLITUS WITH COMPLICATION, WITH LONG-TERM CURRENT USE OF INSULIN (HCC): ICD-10-CM

## 2020-05-01 DIAGNOSIS — T78.40XA ALLERGIC REACTION, INITIAL ENCOUNTER: ICD-10-CM

## 2020-05-01 DIAGNOSIS — F41.0 PANIC: ICD-10-CM

## 2020-05-01 DIAGNOSIS — T78.2XXD ANAPHYLAXIS, SUBSEQUENT ENCOUNTER: ICD-10-CM

## 2020-05-01 DIAGNOSIS — I10 ESSENTIAL HYPERTENSION: ICD-10-CM

## 2020-05-01 DIAGNOSIS — Z79.4 TYPE 2 DIABETES MELLITUS WITH DIABETIC NEUROPATHY, WITH LONG-TERM CURRENT USE OF INSULIN (HCC): Primary | ICD-10-CM

## 2020-05-01 DIAGNOSIS — T78.2XXD ANAPHYLAXIS, SUBSEQUENT ENCOUNTER: Primary | ICD-10-CM

## 2020-05-01 RX ORDER — ALPRAZOLAM 0.5 MG/1
0.5 TABLET ORAL
Qty: 20 TAB | Refills: 0 | Status: SHIPPED | OUTPATIENT
Start: 2020-05-01 | End: 2020-10-12 | Stop reason: SDUPTHER

## 2020-05-01 RX ORDER — NITROGLYCERIN 0.4 MG/1
0.4 TABLET SUBLINGUAL
Qty: 30 TAB | Refills: 1 | Status: SHIPPED | OUTPATIENT
Start: 2020-05-01 | End: 2020-11-05 | Stop reason: SDUPTHER

## 2020-05-01 RX ORDER — FAMOTIDINE 20 MG/1
20 TABLET, FILM COATED ORAL 2 TIMES DAILY
COMMUNITY
End: 2020-11-05

## 2020-05-01 RX ORDER — ALPRAZOLAM 0.5 MG/1
TABLET ORAL
COMMUNITY
End: 2020-05-01 | Stop reason: SDUPTHER

## 2020-05-01 NOTE — PROGRESS NOTES
Chief Complaint   Patient presents with   [address] for allergic reaction     Medication Refill     1. Have you been to the ER, urgent care clinic since your last visit? Hospitalized since your last visit? Yes When: 4/23-4/27 [place]    2. Have you seen or consulted any other health care providers outside of the [address] [place] [address] since your last visit? Include any pap smears or colon screening.  No    Health Maintenance Due   Topic Date Due    Hepatitis C Screening  1965    Pneumococcal 0-64 years (1 of 1 - PPSV23) 08/12/1971    Foot Exam Q1  08/12/1975    A1C test (Diabetic or Prediabetic)  08/12/1975    Eye Exam Retinal or Dilated  08/12/1975    DTaP/Tdap/Td series (1 - Tdap) 08/12/1986    PAP AKA CERVICAL CYTOLOGY  08/12/1986    Shingrix Vaccine Age 50> (1 of 2) 08/12/2015    FOBT Q1Y Age 50-75  08/12/2015    MICROALBUMIN Q1  07/10/2019

## 2020-05-01 NOTE — PROGRESS NOTES
THIS VISIT WAS COMPLETED VIRTUALLY USING mobifriends. ANDRES TOLEDO  Daria Stovall is a 47 y.o. female who is following up on a hospital stay at [facility] for an allergic reaction from 4/234/27. Diagnosed with anaphylaxis due to [address]. I spoken with her about this reaction on 4/22. She took Benadryl and the next day picked up prednisone. She started having hives and an inability to breathe. Activated EMS and was apparently having trouble maintaining consciousness by the time EMS arrived. They gave her epinephrine. Desatted in the emergency room and was admitted. Was given IV Solu-Medrol Benadryl and Pepcid which seemed to work well although had a relapse in rash and pruritus the day before discharge. She was also highly anxious. Her Cymbalta was reduced from 60 mg twice daily to 60 mg daily because of a slight AK I on admission. She was given Xanax for anxiety while inpatient and supplied with some for discharge. Apparently did not sleep much in the hospital, so much so that the day after discharge (Tuesday) she slept the entire day and did not take any of her medicine. Woke up on Wednesday and resumed her medication regime but importantly did not have a relapse of her allergic symptoms in the absence of medicine. Discharged with Benadryl every 8 hours, Pepcid, prednisone 40 mg daily. Her blood sugar is in the 300s due to prednisone. She has noticed some facial swelling as a side effect of prednisone      PMHx:  Past Medical History:   Diagnosis Date    Diabetes ([facility] [address]     Fibula fracture     right    Hip fracture (HCC)     Myocardial infarct ([facility] [address]        Meds:   Current Outpatient Medications   Medication Sig Dispense Refill    famotidine (PEPCID) 20 mg tablet Take 20 mg by mouth two (2) times a day.  hydroCHLOROthiazide (HYDRODIURIL) 25 mg tablet Take 25 mg by mouth daily.  fenofibrate (LOFIBRA) 160 mg tablet Take 1 Tab by mouth daily.  90 Tab 3    gabapentin (NEURONTIN) 300 mg capsule TAKE 3 CAPSULES BY MOUTH THREE TIMES DAILY 270 Cap 0    DULoxetine (CYMBALTA) 60 mg capsule TAKE 1 CAPSULE BY MOUTH ONCE DAILY 90 Cap 3    diphenhydrAMINE (BENADRYL) 25 mg capsule Take 25 mg by mouth every six (6) hours as needed.  albuterol (PROVENTIL VENTOLIN) 2.5 mg /3 mL (0.083 %) nebu 3 mL by Nebulization route every four (4) hours as needed for Wheezing. 40 Each 2    lidocaine (LIDODERM) 5 % APPLY 1 PATCH TOPICALLY ONCE DAILY EVERY 12 HOURS (Patient taking differently: 1 Patch by TransDERmal route every twelve (12) hours as needed. APPLY 1 PATCH TOPICALLY ONCE DAILY EVERY 12 HOURS) 30 Patch 2    Nebulizer & Compressor machine 1 Each by Does Not Apply route every four (4) hours as needed for Wheezing. 1 Each 0    dicyclomine (BENTYL) 10 mg capsule TAKE 1 CAPSULE BY MOUTH THREE TIMES DAILY AS NEEDED 90 Cap 3    latanoprost (XALATAN) 0.005 % ophthalmic solution Apply 1 Drop to eye daily. [address] Bariatric shower chair 1 Each 0    OTHER Right foot AFO 1 Each 0    OTHER Replacement wheels for Drive walker 1 Each 0    TRULICITY 1.5 QB/7.0 mL sub-q pen 1.5 mg by SubCUTAneous route every seven (7) days. 11    metFORMIN ER (GLUCOPHAGE XR) 500 mg tablet Take 500 mg by mouth daily. 11    polyethylene glycol (MIRALAX) 17 gram packet Take 1 Packet by mouth two (2) times daily as needed (constipation). 72 Each 2    cetirizine (ZYRTEC) 10 mg tablet Take 1 Tab by mouth daily. (Patient taking differently: Take 10 mg by mouth daily as needed.) 90 Tab 3    triamcinolone acetonide (KENALOG) 0.1 % topical cream Apply  to affected area two (2) times a day. use thin layer 15 g 0    ketoconazole 1 % sham Apply to wet hair, lather, and rinse thoroughly. Use every 3 to 4 days for up to 8 weeks 200 mL 1    diphenhydrAMINE (BENADRYL) 2 % topical cream Apply  to affected area three (3) times daily as needed for Skin Irritation.  30 g 0    diclofenac (VOLTAREN) 1 % gel Apply  to affected area four (4) times daily. 30 g 2    CHILDREN'S FLONASE SENSIMIST 27.5 mcg/actuation nasal spray USE 2 SPRAYS BY NASAL   ONCE DAILY (Patient taking differently: daily as needed.) 10 g 11    clopidogrel (PLAVIX) 75 mg tab Take 1 Tab by mouth daily. 90 Tab 3    insulin lispro (HUMALOG U-100 INSULIN) 100 unit/mL injection 30 Units by SubCUTAneous route Before breakfast, lunch, and dinner. (Patient taking differently: 45 Units by SubCUTAneous route Before breakfast, lunch, and dinner.) 81 mL 3    ondansetron (ZOFRAN ODT) 8 mg disintegrating tablet Take 1 Tab by mouth every eight (8) hours as needed for Nausea. 27 Tab 2    Walker misc Please dispense a rolling walkerIt M54.5, G89.29, R26.9 1 Each 0    aspirin 81 mg chewable tablet Take 325 mg by mouth daily.  pantoprazole (PROTONIX) 40 mg tablet Take 1 Tab by mouth daily. 90 Tab 3    rosuvastatin (CRESTOR) 20 mg tablet Take 1 Tab by mouth nightly. 90 Tab 3    Blood Pressure Monitor (BLOOD PRESSURE KIT) kit Check blood pressure daily 1 Kit 0    ONETOUCH ULTRA TEST strip       LANTUS 100 unit/mL injection 90 Units.  Insulin Syringe-Needle U-100 1 mL 31 x 5/16\" syrg   3    ALPRAZolam (XANAX) 0.5 mg tablet Take 1 Tab by mouth three (3) times daily as needed for Anxiety. Max Daily Amount: 1.5 mg. 20 Tab 0    nitroglycerin (NITROSTAT) 0.4 mg SL tablet 1 Tab by SubLINGual route every five (5) minutes as needed for Chest Pain. Up to 3 doses. 30 Tab 1       Allergies:    Allergies   Allergen Reactions    Atorvastatin Other (comments)     Violent vomiting      Hydrocortisone Other (comments) and Unknown (comments)     Made wound worse when applied  Made wound worse when applied      Macrobid [Nitrofurantoin Monohyd/M-Cryst] Anaphylaxis    Methocarbamol Other (comments) and Nausea and Vomiting    Bacitracin Other (comments) and Unknown (comments)    Celexa [Citalopram] Other (comments)    Diflucan [Fluconazole] Nausea and Vomiting    Flagyl [Metronidazole] Hives    Lisinopril Other (comments)     Low BP    Metformin Other (comments)    Metoclopramide Other (comments) and Unknown (comments)    Neomycin-Bacitracnzn-Polymyxnb Unknown (comments)    Neosporin [Benzalkonium Chloride] Other (comments)    Pcn [Penicillins] Hives    Silver Rash     Silver tape    Sulfa (Sulfonamide Antibiotics) Hives    Sulfamethoxazole-Trimethoprim Unknown (comments)       Smoker:  Social History     Tobacco Use   Smoking Status Former Smoker   Smokeless Tobacco Never Used       ETOH:   Social History     Substance and Sexual Activity   Alcohol Use No       FH:   Family History   Problem Relation Age of Onset    Cancer Mother     Diabetes Mother     Heart Disease Mother     Heart Disease Father     Diabetes Father        ROS:   As listed in HPI. In addition:  Constitutional:   No headache, fever, fatigue, weight loss or weight gain      Cardiac:    No chest pain      Resp:   No cough or shortness of breath      Neuro   No loss of consciousness, dizziness, seizures      Physical Exam:  Height 5' 5\" (1.651 m), weight 232 lb (105.2 kg), last menstrual period 02/19/2011. GEN: No apparent distress. Alert and oriented and responds to all questions appropriately. NEUROLOGIC:  No focal neurologic deficits. Coordination and gait grossly intact. EXT: Well perfused. No edema. SKIN: No obvious rashes. Due to this being a TeleHealth evaluation, many elements of the physical examination are unable to be assessed.         Assessment and Plan     Anaphylaxis likely due to nitrofurantoin  H1 blockade with Benadryl  H2 blockade with Pepcid  Prednisone 40 mg  She skipped all of these medicines on Tuesday because she slept the entire day, come Wednesday she restarted the medicine but did not have any allergic symptoms at this point which is reassuring    Getting significant side effects medicine so can start weaning down  Start by taking prednisone 20 mg for the next 2 days,   if no recurrence of allergy symptoms can stop prednisone on Monday  2 days later stop Pepcid on Wednesday  2 days later stop Benadryl on Friday    She is having once or twice daily panic attacks lasting for 30 minutes due to anxieties around allergic reaction. She started taking Xanax \"scheduled\" every 8 hours. Explained that is not really going to be effective. She does find Xanax effective in aborting panic attack after about 15-20 minutes. Refilled and hope that she will not need this medicine for very long    She resumed her Cymbalta 60 mg twice daily. She is holding hydrochlorothiazide          ICD-10-CM ICD-9-CM    1. Type 2 diabetes mellitus with diabetic neuropathy, with long-term current use of insulin ([facility]) E11.40 250.60     Z79.4 357.2      V58.67    2. Allergic reaction, initial encounter T78.40XA 995.3    3. Anaphylaxis, subsequent encounter T78. 2XXD V58.89          Pursuant to the emergency declaration under the [facility] waiver authority and the [employer] Act, this Virtual  Visit was conducted, with patient's consent, to reduce the patient's risk of exposure to COVID-19 and provide continuity of care for an established patient. Services were provided through a video synchronous discussion virtually to substitute for in-person clinic visit.

## 2020-05-20 DIAGNOSIS — M79.2 NEUROPATHIC PAIN: ICD-10-CM

## 2020-05-21 RX ORDER — GABAPENTIN 300 MG/1
CAPSULE ORAL
Qty: 270 CAP | Refills: 3 | Status: SHIPPED | OUTPATIENT
Start: 2020-05-21 | End: 2020-11-27

## 2020-05-28 ENCOUNTER — VIRTUAL VISIT (OUTPATIENT)
Dept: FAMILY MEDICINE CLINIC | Age: 55
End: 2020-05-28

## 2020-05-28 ENCOUNTER — TELEPHONE (OUTPATIENT)
Dept: FAMILY MEDICINE CLINIC | Age: 55
End: 2020-05-28

## 2020-05-28 DIAGNOSIS — R19.7 DIARRHEA, UNSPECIFIED TYPE: Primary | ICD-10-CM

## 2020-05-28 NOTE — TELEPHONE ENCOUNTER
----- Message from [place] [facility] sent at 5/28/2020  4:33 PM EDT -----  Regarding: Non-Urgent Medical Question  Contact: [phone]  This is [place] [facility] my phone lost u I did not hang up on u tryed to call back would not let me sorry

## 2020-05-28 NOTE — TELEPHONE ENCOUNTER
Pt is calling wanting something called in for her thinks she may be coming down with cdiff she has diarrhea and she states she doesn't want to end up in the hospital

## 2020-05-28 NOTE — PROGRESS NOTES
Chief Complaint   Patient presents with    Diarrhea     1. Have you been to the ER, urgent care clinic since your last visit? Hospitalized since your last visit? No    2. Have you seen or consulted any other health care providers outside of the [address] [place] [address] since your last visit? Include any pap smears or colon screening. No    Health maintenance reviewed. Pt informed of health maintenance past due and/or upcoming. Pt verbalized understanding.      Health Maintenance Due   Topic Date Due    Hepatitis C Screening  1965    Pneumococcal 0-64 years (1 of 1 - PPSV23) 08/12/1971    Foot Exam Q1  08/12/1975    A1C test (Diabetic or Prediabetic)  08/12/1975    Eye Exam Retinal or Dilated  08/12/1975    DTaP/Tdap/Td series (1 - Tdap) 08/12/1986    PAP AKA CERVICAL CYTOLOGY  08/12/1986    Shingrix Vaccine Age 50> (1 of 2) 08/12/2015    FOBT Q1Y Age 50-75  08/12/2015    MICROALBUMIN Q1  07/10/2019

## 2020-05-28 NOTE — PROGRESS NOTES
Chief Complaint   Patient presents with    Diarrhea     Pt was evaluated via phone only. Pt reports that she woke up this morning with diarrhea about 330am.     Pt is concerned due to having C. Diff in the past, she would like to start treatment immediately due to concern re: symptoms and the smell of the diarrhea. Pt requested vancomycin, liquid. Rachelle Stovall is a 47 y.o. female evaluated via audio only technology on 5/28/2020. Consent: She and/or her health care decision maker is aware that she may receive a bill for this audio only encounter, depending on her insurance coverage, and has provided verbal consent to proceed: Yes    I communicated with the patient and/or health care decision maker about the nature and details of the following:  Assessment & Plan:   1. Diarrhea, unspecified type  -I advised pt that we should get testing to confirm the presence of C. Diff, pt became upset, said that if she did not start treatment right away then she would end up in the hospital.   -I advised that this medication should only be used when we know that C. Diff is present.   -Pt hung on me, did not answer when I attempted to reach back out. 12  Subjective:   Rachelle Stovall is a 47 y.o. female who was seen for Diarrhea      Prior to Admission medications    Medication Sig Start Date End Date Taking? Authorizing Provider   gabapentin (NEURONTIN) 300 mg capsule TAKE 3 CAPSULES BY MOUTH THREE TIMES DAILY 5/21/20  Yes Treasure Ruelas MD   famotidine (PEPCID) 20 mg tablet Take 20 mg by mouth two (2) times a day. Yes Provider, Historical   ALPRAZolam (XANAX) 0.5 mg tablet Take 1 Tab by mouth three (3) times daily as needed for Anxiety. Max Daily Amount: 1.5 mg. 5/1/20  Yes Treasure Ruelas MD   nitroglycerin (NITROSTAT) 0.4 mg SL tablet 1 Tab by SubLINGual route every five (5) minutes as needed for Chest Pain. Up to 3 doses.  5/1/20  Yes Treasure Ruelas MD   hydroCHLOROthiazide (HYDRODIURIL) 25 mg tablet Take 25 mg by mouth daily. Yes Provider, Historical   fenofibrate (LOFIBRA) 160 mg tablet Take 1 Tab by mouth daily. 4/3/20  Yes Ariel Lawson MD   DULoxetine (CYMBALTA) 60 mg capsule TAKE 1 CAPSULE BY MOUTH ONCE DAILY 4/3/20  Yes Ariel Lawson MD   diphenhydrAMINE (BENADRYL) 25 mg capsule Take 25 mg by mouth every six (6) hours as needed. 2/17/20  Yes Provider, Historical   albuterol (PROVENTIL VENTOLIN) 2.5 mg /3 mL (0.083 %) nebu 3 mL by Nebulization route every four (4) hours as needed for Wheezing. 2/27/20  Yes Ariel Lawson MD   lidocaine (LIDODERM) 5 % APPLY 1 PATCH TOPICALLY ONCE DAILY EVERY 12 HOURS  Patient taking differently: 1 Patch by TransDERmal route every twelve (12) hours as needed. APPLY 1 PATCH TOPICALLY ONCE DAILY EVERY 12 HOURS 2/19/20  Yes Ariel Lawson MD   Nebulizer & Compressor machine 1 Each by Does Not Apply route every four (4) hours as needed for Wheezing. 2/17/20  Yes Ariel Lawson MD   dicyclomine (BENTYL) 10 mg capsule TAKE 1 CAPSULE BY MOUTH THREE TIMES DAILY AS NEEDED 12/18/19  Yes Ariel Lawson MD   latanoprost (XALATAN) 0.005 % ophthalmic solution Apply 1 Drop to eye daily. 10/31/19  Yes Provider, Historical   OTHER Bariatric shower chair 11/19/19  Yes Ariel Lawson MD   OTHER Right foot AFO 11/19/19  Yes Ariel Lawson MD   OTHER Replacement wheels for Drive walker 18/30/70  Yes Ariel Lawson MD   TRULICITY 1.5 TV/9.9 mL sub-q pen 1.5 mg by SubCUTAneous route every seven (7) days. 11/10/19  Yes Provider, Historical   metFORMIN ER (GLUCOPHAGE XR) 500 mg tablet Take 500 mg by mouth daily. 8/20/19  Yes Provider, Historical   polyethylene glycol (MIRALAX) 17 gram packet Take 1 Packet by mouth two (2) times daily as needed (constipation). 9/27/19  Yes Ariel Lawson MD   cetirizine (ZYRTEC) 10 mg tablet Take 1 Tab by mouth daily. Patient taking differently: Take 10 mg by mouth daily as needed.  8/28/19  Yes Ariel Lawson MD   triamcinolone acetonide (KENALOG) 0.1 % topical cream Apply  to affected area two (2) times a day. use thin layer 8/28/19  Yes Eugene Brush MD   ketoconazole 1 % sham Apply to wet hair, lather, and rinse thoroughly. Use every 3 to 4 days for up to 8 weeks 8/12/19  Yes Eugene Brush MD   diphenhydrAMINE (BENADRYL) 2 % topical cream Apply  to affected area three (3) times daily as needed for Skin Irritation. 6/5/19  Yes Eugene Brush MD   diclofenac (VOLTAREN) 1 % gel Apply  to affected area four (4) times daily. 6/5/19  Yes Eugene Brush MD   CHILDREN'S FLONASE SENSIMIST 27.5 mcg/actuation nasal spray USE 2 SPRAYS BY NASAL   ONCE DAILY  Patient taking differently: daily as needed. 5/13/19  Yes Eugene Brush MD   clopidogrel (PLAVIX) 75 mg tab Take 1 Tab by mouth daily. 4/22/19  Yes Eugene Brush MD   insulin lispro (HUMALOG U-100 INSULIN) 100 unit/mL injection 30 Units by SubCUTAneous route Before breakfast, lunch, and dinner. Patient taking differently: 45 Units by SubCUTAneous route Before breakfast, lunch, and dinner. 2/18/19  Yes Eugene Brush MD   ondansetron (ZOFRAN ODT) 8 mg disintegrating tablet Take 1 Tab by mouth every eight (8) hours as needed for Nausea. 11/30/18  Yes Eugene Brush MD   Walker misc Please dispense a rolling walkerIt M54.5, G89.29, R26.9 10/18/18  Yes Eugene Brush MD   aspirin 81 mg chewable tablet Take 325 mg by mouth daily. Yes Other, MD Belle   pantoprazole (PROTONIX) 40 mg tablet Take 1 Tab by mouth daily. 10/2/18  Yes Eugene Brush MD   rosuvastatin (CRESTOR) 20 mg tablet Take 1 Tab by mouth nightly. 9/10/18  Yes Eugene Brush MD   Blood Pressure Monitor (BLOOD PRESSURE KIT) kit Check blood pressure daily 8/30/18  Yes Cali Recinos MD   [facility] ULTRA TEST strip  11/5/15  Yes Provider, Historical   LANTUS 100 unit/mL injection 90 Units.  11/29/15  Yes Provider, Historical   Insulin Syringe-Needle U-100 1 mL 31 x 5/16\" syrg  11/10/15  Yes Provider, Historical     Allergies   Allergen Reactions  Atorvastatin Other (comments)     Violent vomiting      Hydrocortisone Other (comments) and Unknown (comments)     Made wound worse when applied  Made wound worse when applied      Macrobid [Nitrofurantoin Monohyd/M-Cryst] Anaphylaxis    Methocarbamol Other (comments) and Nausea and Vomiting    Bacitracin Other (comments) and Unknown (comments)    Celexa [Citalopram] Other (comments)    Diflucan [Fluconazole] Nausea and Vomiting    Flagyl [Metronidazole] Hives    Lisinopril Other (comments)     Low BP    Metformin Other (comments)    Metoclopramide Other (comments) and Unknown (comments)    Neomycin-Bacitracnzn-Polymyxnb Unknown (comments)    Neosporin [Benzalkonium Chloride] Other (comments)    Pcn [Penicillins] Hives    Silver Rash     Silver tape    Sulfa (Sulfonamide Antibiotics) Hives    Sulfamethoxazole-Trimethoprim Unknown (comments)       Patient Active Problem List   Diagnosis Code    Obesity, morbid ([facility]) E66.01    S/P CABG x 3 Z95.1    Hypertriglyceridemia E78.1    Essential hypertension I10    Back pain M54.9    Coronary artery disease involving native heart without angina pectoris I25.10    Type 2 diabetes mellitus with complication, with long-term current use of insulin (HCC) E11.8, Z79.4    Type 2 diabetes with nephropathy (HCC) E11.21    Type 2 diabetes mellitus with diabetic neuropathy ([facility]) E11.40       ROS    I affirm this is a Patient-Initiated Episode with a Patient who has not had a related appointment within my department in the past 7 days or scheduled within the next 24 hours.     Total Time: minutes: 11-20 minutes    Note: not billable if this call serves to triage the patient into an appointment for the relevant concern      Talya Metcalf MD

## 2020-06-08 ENCOUNTER — TELEPHONE (OUTPATIENT)
Dept: FAMILY MEDICINE CLINIC | Age: 55
End: 2020-06-08

## 2020-06-08 NOTE — TELEPHONE ENCOUNTER
Pt is calling stating she was in the hospital at [facility]  She states they want her to f/u with you she wants to come in is this a virtual visit or in office

## 2020-06-09 ENCOUNTER — VIRTUAL VISIT (OUTPATIENT)
Dept: FAMILY MEDICINE CLINIC | Age: 55
End: 2020-06-09

## 2020-06-09 DIAGNOSIS — K44.9 HIATAL HERNIA: Primary | ICD-10-CM

## 2020-06-09 DIAGNOSIS — R68.89 EXERCISE INTOLERANCE: ICD-10-CM

## 2020-06-09 RX ORDER — OXYCODONE HYDROCHLORIDE 5 MG/1
TABLET ORAL
COMMUNITY
Start: 2020-06-05 | End: 2021-12-01

## 2020-06-09 RX ORDER — METOPROLOL TARTRATE 25 MG/1
25 TABLET, FILM COATED ORAL DAILY
COMMUNITY
Start: 2018-08-16

## 2020-06-09 NOTE — PROGRESS NOTES
Rachelle Stovall is a 47 y.o. female evaluated via audio only technology on 6/9/2020. Consent: She and/or her health care decision maker is aware that she may receive a bill for this audio only encounter, depending on her insurance coverage, and has provided verbal consent to proceed: Yes    I communicated with the patient and/or health care decision maker about the nature and details of the following:  Assessment & Plan:     Hiatal hernia  Likely cause of her chest pain  Protonix  Monitor symptoms and expect a few weeks for improvement    HENLEY  Did not appear to be her heart based on echo and cath but she did not get a stress test  I provided some reassurance and encouraged her to get exercise to improve her exercise tolerance    Suggest 2-week follow-up because of her level of concern. If she is feeling better at that point we will follow-up another chronic medical issues and think about updating routine blood work which was last done 1 year ago in our system      12  Subjective:   Rachelle Stovall is a 47 y.o. female who was seen for Hospital Follow Up    Presents to follow-up after hospital stay at [facility]. She was admitted from 6/16/5 with chest pain shortness of breath and HENLEY. Describes spending the night in the emergency room and being taken to Cath Lab the next morning. Told by cardiology that her coronary arteries did not have any blockages. Other work-up consisted of echocardiogram that she was told was normal and EGD for the persistent chest pain. Was found to have a hiatal hernia. She was given Protonix and metoprolol 12.5 mg twice daily and discharge. Reports that she is still having chest pain although it is not happening quite as frequently. Maybe 2 or 3 times a day. She does not notice particular pattern or correlation with food or position other than it seems more likely to happen when she is standing up.   She notes that upon reflection the shortness of breath complaint had been going on for weeks or months beforehand and she had attributed it to being out of shape    Prior to Admission medications    Medication Sig Start Date End Date Taking? Authorizing Provider   metoprolol tartrate (LOPRESSOR) 25 mg tablet Take 12.5 mg by mouth two (2) times a day. 8/16/18  Yes Provider, Historical   oxyCODONE IR (ROXICODONE) 5 mg immediate release tablet  6/5/20  Yes Provider, Historical   gabapentin (NEURONTIN) 300 mg capsule TAKE 3 CAPSULES BY MOUTH THREE TIMES DAILY 5/21/20  Yes Osiris Stewart MD   famotidine (PEPCID) 20 mg tablet Take 20 mg by mouth two (2) times a day. Yes Provider, Historical   ALPRAZolam (XANAX) 0.5 mg tablet Take 1 Tab by mouth three (3) times daily as needed for Anxiety. Max Daily Amount: 1.5 mg. 5/1/20  Yes Osiris Stewart MD   nitroglycerin (NITROSTAT) 0.4 mg SL tablet 1 Tab by SubLINGual route every five (5) minutes as needed for Chest Pain. Up to 3 doses. 5/1/20  Yes Osiris Stewart MD   hydroCHLOROthiazide (HYDRODIURIL) 25 mg tablet Take 25 mg by mouth daily. Yes Provider, Historical   fenofibrate (LOFIBRA) 160 mg tablet Take 1 Tab by mouth daily. 4/3/20  Yes Osiris Stewart MD   DULoxetine (CYMBALTA) 60 mg capsule TAKE 1 CAPSULE BY MOUTH ONCE DAILY 4/3/20  Yes Osiris Stewart MD   diphenhydrAMINE (BENADRYL) 25 mg capsule Take 25 mg by mouth every six (6) hours as needed. 2/17/20  Yes Provider, Historical   albuterol (PROVENTIL VENTOLIN) 2.5 mg /3 mL (0.083 %) nebu 3 mL by Nebulization route every four (4) hours as needed for Wheezing. 2/27/20  Yes Osiris Stewart MD   lidocaine (LIDODERM) 5 % APPLY 1 PATCH TOPICALLY ONCE DAILY EVERY 12 HOURS  Patient taking differently: 1 Patch by TransDERmal route every twelve (12) hours as needed. APPLY 1 PATCH TOPICALLY ONCE DAILY EVERY 12 HOURS 2/19/20  Yes Osiris Stewart MD   Nebulizer & Compressor machine 1 Each by Does Not Apply route every four (4) hours as needed for Wheezing.  2/17/20  Yes Osiris Stewart MD   dicyclomine (BENTYL) 10 mg capsule TAKE 1 CAPSULE BY MOUTH THREE TIMES DAILY AS NEEDED 12/18/19  Yes Joel Christopher MD   latanoprost (XALATAN) 0.005 % ophthalmic solution Apply 1 Drop to eye daily. 10/31/19  Yes Provider, Historical   OTHER Bariatric shower chair 11/19/19  Yes Joel Christopher MD   OTHER Right foot AFO 11/19/19  Yes Joel Christopher MD   OTHER Replacement wheels for Drive walker 64/01/59  Yes Joel Christopher MD   TRULICITY 1.5 HD/0.3 mL sub-q pen 1.5 mg by SubCUTAneous route every seven (7) days. 11/10/19  Yes Provider, Historical   metFORMIN ER (GLUCOPHAGE XR) 500 mg tablet Take 500 mg by mouth daily. 8/20/19  Yes Provider, Historical   polyethylene glycol (MIRALAX) 17 gram packet Take 1 Packet by mouth two (2) times daily as needed (constipation). 9/27/19  Yes Joel Christopher MD   cetirizine (ZYRTEC) 10 mg tablet Take 1 Tab by mouth daily. Patient taking differently: Take 10 mg by mouth daily as needed. 8/28/19  Yes Joel Christopher MD   triamcinolone acetonide (KENALOG) 0.1 % topical cream Apply  to affected area two (2) times a day. use thin layer 8/28/19  Yes Joel Christopher MD   ketoconazole 1 % sham Apply to wet hair, lather, and rinse thoroughly. Use every 3 to 4 days for up to 8 weeks 8/12/19  Yes Joel Christopher MD   diphenhydrAMINE (BENADRYL) 2 % topical cream Apply  to affected area three (3) times daily as needed for Skin Irritation. 6/5/19  Yes Joel Christopher MD   diclofenac (VOLTAREN) 1 % gel Apply  to affected area four (4) times daily. 6/5/19  Yes Joel Christopher MD   CHILDREN'S FLONASE SENSIMIST 27.5 mcg/actuation nasal spray USE 2 SPRAYS BY NASAL   ONCE DAILY  Patient taking differently: daily as needed. 5/13/19  Yes Joel Christopher MD   clopidogrel (PLAVIX) 75 mg tab Take 1 Tab by mouth daily. 4/22/19  Yes Joel Christopher MD   insulin lispro (HUMALOG U-100 INSULIN) 100 unit/mL injection 30 Units by SubCUTAneous route Before breakfast, lunch, and dinner.   Patient taking differently: 45 Units by SubCUTAneous route Before breakfast, lunch, and dinner. 2/18/19  Yes Ariel Lawson MD   ondansetron (ZOFRAN ODT) 8 mg disintegrating tablet Take 1 Tab by mouth every eight (8) hours as needed for Nausea. 11/30/18  Yes Ariel Lawson MD   [facility] a rolling walkerIt M54.5, G89.29, R26.9 10/18/18  Yes Ariel Lawson MD   aspirin 81 mg chewable tablet Take 325 mg by mouth daily. Yes Other, MD Belle   pantoprazole (PROTONIX) 40 mg tablet Take 1 Tab by mouth daily. 10/2/18  Yes Ariel Lawson MD   rosuvastatin (CRESTOR) 20 mg tablet Take 1 Tab by mouth nightly. 9/10/18  Yes Ariel Lawson MD   Blood Pressure Monitor (BLOOD PRESSURE KIT) kit Check blood pressure daily 8/30/18  Yes Fitz Yates MD   [facility] ULTRA TEST strip  11/5/15  Yes Provider, Historical   LANTUS 100 unit/mL injection 90 Units.  11/29/15  Yes Provider, Historical   Insulin Syringe-Needle U-100 1 mL 31 x 5/16\" syrg  11/10/15  Yes Provider, Historical     Allergies   Allergen Reactions    Atorvastatin Other (comments)     Violent vomiting      Hydrocortisone Other (comments) and Unknown (comments)     Made wound worse when applied  Made wound worse when applied      Macrobid [Nitrofurantoin Monohyd/M-Cryst] Anaphylaxis    Methocarbamol Other (comments) and Nausea and Vomiting    Bacitracin Other (comments) and Unknown (comments)    Celexa [Citalopram] Other (comments)    Diflucan [Fluconazole] Nausea and Vomiting    Flagyl [Metronidazole] Hives    Lisinopril Other (comments)     Low BP    Metformin Other (comments)    Metoclopramide Other (comments) and Unknown (comments)    Neomycin-Bacitracnzn-Polymyxnb Unknown (comments)    Neosporin [Benzalkonium Chloride] Other (comments)    Pcn [Penicillins] Hives    Silver Rash     Silver tape    Sulfa (Sulfonamide Antibiotics) Hives    Sulfamethoxazole-Trimethoprim Unknown (comments)     I affirm this is a Patient-Initiated Episode with a Patient who has not had a related appointment within my department in the past 7 days or scheduled within the next 24 hours.     Total Time: minutes: 21-30 minutes    Note: not billable if this call serves to triage the patient into an appointment for the relevant concern      Darion Méndez MD

## 2020-06-09 NOTE — PROGRESS NOTES
Chief Complaint   Patient presents with   [facility] Follow Up       Health Maintenance reviewed     1. Have you been to the ER, urgent care clinic since your last visit? Hospitalized since your last visit? YES, [facility]    2. Have you seen or consulted any other health care providers outside of the [address] [place] [address] since your last visit? Include any pap smears or colon screening.   No

## 2020-06-25 ENCOUNTER — VIRTUAL VISIT (OUTPATIENT)
Dept: FAMILY MEDICINE CLINIC | Age: 55
End: 2020-06-25

## 2020-06-25 DIAGNOSIS — I25.10 CORONARY ARTERY DISEASE INVOLVING NATIVE HEART WITHOUT ANGINA PECTORIS, UNSPECIFIED VESSEL OR LESION TYPE: ICD-10-CM

## 2020-06-25 DIAGNOSIS — E11.21 TYPE 2 DIABETES WITH NEPHROPATHY (HCC): ICD-10-CM

## 2020-06-25 DIAGNOSIS — I10 ESSENTIAL HYPERTENSION: ICD-10-CM

## 2020-06-25 DIAGNOSIS — K44.9 HIATAL HERNIA: ICD-10-CM

## 2020-06-25 DIAGNOSIS — M54.50 CHRONIC LOW BACK PAIN, UNSPECIFIED BACK PAIN LATERALITY, UNSPECIFIED WHETHER SCIATICA PRESENT: Primary | ICD-10-CM

## 2020-06-25 DIAGNOSIS — G89.29 CHRONIC LOW BACK PAIN, UNSPECIFIED BACK PAIN LATERALITY, UNSPECIFIED WHETHER SCIATICA PRESENT: Primary | ICD-10-CM

## 2020-06-25 RX ORDER — BISMUTH SUBSALICYLATE 262 MG
1 TABLET,CHEWABLE ORAL DAILY
COMMUNITY
End: 2020-11-05

## 2020-06-25 RX ORDER — CYCLOBENZAPRINE HCL 5 MG
5 TABLET ORAL
Qty: 30 TAB | Refills: 3 | Status: SHIPPED | OUTPATIENT
Start: 2020-06-25 | End: 2020-12-11

## 2020-06-25 RX ORDER — PANTOPRAZOLE SODIUM 40 MG/1
40 TABLET, DELAYED RELEASE ORAL DAILY
Qty: 90 TAB | Refills: 3 | Status: SHIPPED | OUTPATIENT
Start: 2020-06-25 | End: 2021-12-01

## 2020-06-25 NOTE — PROGRESS NOTES
Chief Complaint   Patient presents with    Hip Pain     radiating to foot- right     Medication Refill     protonix    Labs       1. Have you been to the ER, urgent care clinic since your last visit? Hospitalized since your last visit? No    2. Have you seen or consulted any other health care providers outside of the [address] [place] [address] since your last visit? Include any pap smears or colon screening. No    Health maintenance reviewed. Pt informed of health maintenance past due and/or upcoming. Pt verbalized understanding.      Health Maintenance Due   Topic Date Due    Hepatitis C Screening  1965    Pneumococcal 0-64 years (1 of 1 - PPSV23) 08/12/1971    Foot Exam Q1  08/12/1975    A1C test (Diabetic or Prediabetic)  08/12/1975    Eye Exam Retinal or Dilated  08/12/1975    DTaP/Tdap/Td series (1 - Tdap) 08/12/1986    PAP AKA CERVICAL CYTOLOGY  08/12/1986    Shingrix Vaccine Age 50> (1 of 2) 08/12/2015    FOBT Q1Y Age 50-75  08/12/2015    MICROALBUMIN Q1  07/10/2019    Lipid Screen  06/21/2020

## 2020-06-25 NOTE — PROGRESS NOTES
Ten Stovall is a 47 y.o. female evaluated via audio only technology on 6/25/2020. Consent: She and/or her health care decision maker is aware that she may receive a bill for this audio only encounter, depending on her insurance coverage, and has provided verbal consent to proceed: Yes    I communicated with the patient and/or health care decision maker about the nature and details of the following:  Assessment & Plan:     Right-sided low back pain radiating down the right leg. Possibly stemming from a \"pop\" in the buttock with a twisting motion in April  Because of duration will refer to orthopedics  Cymbalta 60 mg  Gabapentin 900 mg 3 times daily  Flexeril 10 mg reduced to 5 mg. This is the most effective medication she has taken but 10 mg is causing her to have trouble with balance at [facility]  Strongly discouraged oxycodone    hiatal hernia  Is taking Protonix daily for the last month and has seen substantial improvement in her chest pain. Still feels like food is occasionally getting stuck which will take some time. Refilled Protonix. Surveillance labs    Labs reviewed. A1c 8.1% which is an improvement over the A1c is in care everywhere from about a year ago 9 or higher. Triglycerides elevated [id number]. Triglycerides were also elevated 6/2019 above 500 already taking fenofibrate. Followed by endocrinology  12  Subjective:   Ten Stovall is a 47 y.o. female who was seen for Hip Pain (radiating to foot- right ); Medication Refill (protonix); and Labs      Her primary complaint is back pain that she feels has worsened over the last month. feels that she woke up with right-sided low back pain that goes down the back of her leg into the back of her calf and involves the entire foot. It is intermittently numb which she describes as a semi-unpleasant tingle in the leg and calf and only occasionally in the foot. Wakes very stiff in the morning. Cannot get comfortable at night.   Uses 10 mg Flexeril which will get her 8 hours asleep, wakes feeling stiff but she is wary of this medicine because she feels unsteady if she has to get up to go to the bathroom. She tries gabapentin 900 mg 3 times daily. She received an oxycodone 5 mg prescription from a recent hospital stay and has tried that a few times but does not take the Flexeril or the gabapentin on nights where she tries this. Feels this also gets her a good night sleep and does not feel stiff waking up. Recall that she has had a problem where she took so many sedating medications that she woke with vomit in her mouth     She thinks that she has had this problem to some degree since April. Recall from that visit she perceived that she had a popping sensation in the meat of her buttock while sitting and twisting her shower chair. That was also a right-sided problem    Prior to Admission medications    Medication Sig Start Date End Date Taking? Authorizing Provider   multivitamin (ONE A DAY) tablet Take 1 Tab by mouth daily. Yes Provider, Historical   pantoprazole (PROTONIX) 40 mg tablet Take 1 Tab by mouth daily. 6/25/20  Yes Danni De MD   cyclobenzaprine (FLEXERIL) 5 mg tablet Take 1 Tab by mouth nightly as needed for Muscle Spasm(s). 6/25/20  Yes Danni De MD   metoprolol tartrate (LOPRESSOR) 25 mg tablet Take 12.5 mg by mouth two (2) times a day. 8/16/18  Yes Provider, Historical   oxyCODONE IR (ROXICODONE) 5 mg immediate release tablet  6/5/20  Yes Provider, Historical   gabapentin (NEURONTIN) 300 mg capsule TAKE 3 CAPSULES BY MOUTH THREE TIMES DAILY 5/21/20  Yes Danni De MD   famotidine (PEPCID) 20 mg tablet Take 20 mg by mouth two (2) times a day. Yes Provider, Historical   ALPRAZolam (XANAX) 0.5 mg tablet Take 1 Tab by mouth three (3) times daily as needed for Anxiety.  Max Daily Amount: 1.5 mg. 5/1/20  Yes Danni De MD   nitroglycerin (NITROSTAT) 0.4 mg SL tablet 1 Tab by SubLINGual route every five (5) minutes as needed for Chest Pain. Up to 3 doses. 5/1/20  Yes Damon Bagley MD   hydroCHLOROthiazide (HYDRODIURIL) 25 mg tablet Take 25 mg by mouth daily. Yes Provider, Historical   fenofibrate (LOFIBRA) 160 mg tablet Take 1 Tab by mouth daily. 4/3/20  Yes Damon Bagley MD   DULoxetine (CYMBALTA) 60 mg capsule TAKE 1 CAPSULE BY MOUTH ONCE DAILY 4/3/20  Yes Damon Bagley MD   diphenhydrAMINE (BENADRYL) 25 mg capsule Take 25 mg by mouth every six (6) hours as needed. 2/17/20  Yes Provider, Historical   albuterol (PROVENTIL VENTOLIN) 2.5 mg /3 mL (0.083 %) nebu 3 mL by Nebulization route every four (4) hours as needed for Wheezing. 2/27/20  Yes Damon Bagley MD   lidocaine (LIDODERM) 5 % APPLY 1 PATCH TOPICALLY ONCE DAILY EVERY 12 HOURS  Patient taking differently: 1 Patch by TransDERmal route every twelve (12) hours as needed. APPLY 1 PATCH TOPICALLY ONCE DAILY EVERY 12 HOURS 2/19/20  Yes Damon Bagley MD   Nebulizer & Compressor machine 1 Each by Does Not Apply route every four (4) hours as needed for Wheezing. 2/17/20  Yes Damon Bagley MD   dicyclomine (BENTYL) 10 mg capsule TAKE 1 CAPSULE BY MOUTH THREE TIMES DAILY AS NEEDED 12/18/19  Yes Damon Bagley MD   latanoprost (XALATAN) 0.005 % ophthalmic solution Apply 1 Drop to eye daily. 10/31/19  Yes Provider, Historical   OTHER Bariatric shower chair 11/19/19  Yes Damon Bagley MD   OTHER Right foot AFO 11/19/19  Yes Damon Bagley MD   OTHER Replacement wheels for Drive walker 89/05/90  Yes Damon Bagley MD   TRULICITY 1.5 MV/3.9 mL sub-q pen 1.5 mg by SubCUTAneous route every seven (7) days. 11/10/19  Yes Provider, Historical   metFORMIN ER (GLUCOPHAGE XR) 500 mg tablet Take 500 mg by mouth daily. 8/20/19  Yes Provider, Historical   polyethylene glycol (MIRALAX) 17 gram packet Take 1 Packet by mouth two (2) times daily as needed (constipation). 9/27/19  Yes Damon Bagley MD   cetirizine (ZYRTEC) 10 mg tablet Take 1 Tab by mouth daily.   Patient taking differently: Take 10 mg by mouth daily as needed. 8/28/19  Yes Richard Veloz MD   triamcinolone acetonide (KENALOG) 0.1 % topical cream Apply  to affected area two (2) times a day. use thin layer 8/28/19  Yes Richard Veloz MD   ketoconazole 1 % sham Apply to wet hair, lather, and rinse thoroughly. Use every 3 to 4 days for up to 8 weeks 8/12/19  Yes Richard Veloz MD   diphenhydrAMINE (BENADRYL) 2 % topical cream Apply  to affected area three (3) times daily as needed for Skin Irritation. 6/5/19  Yes Richard Veloz MD   diclofenac (VOLTAREN) 1 % gel Apply  to affected area four (4) times daily. 6/5/19  Yes Richard Veloz MD   CHILDREN'S FLONASE SENSIMIST 27.5 mcg/actuation nasal spray USE 2 SPRAYS BY NASAL   ONCE DAILY  Patient taking differently: daily as needed. 5/13/19  Yes Richard Veloz MD   clopidogrel (PLAVIX) 75 mg tab Take 1 Tab by mouth daily. 4/22/19  Yes Richard Veloz MD   insulin lispro (HUMALOG U-100 INSULIN) 100 unit/mL injection 30 Units by SubCUTAneous route Before breakfast, lunch, and dinner. Patient taking differently: 45 Units by SubCUTAneous route Before breakfast, lunch, and dinner. 2/18/19  Yes Richard Veloz MD   ondansetron (ZOFRAN ODT) 8 mg disintegrating tablet Take 1 Tab by mouth every eight (8) hours as needed for Nausea. 11/30/18  Yes MD Michi Haddad misc Please dispense a rolling walkerIt M54.5, G89.29, R26.9 10/18/18  Yes Richard Veloz MD   aspirin 81 mg chewable tablet Take 325 mg by mouth daily. Yes Other, MD Belle   rosuvastatin (CRESTOR) 20 mg tablet Take 1 Tab by mouth nightly. 9/10/18  Yes Richard Veloz MD   Blood Pressure Monitor (BLOOD PRESSURE KIT) kit Check blood pressure daily 8/30/18  Yes Dasia Frazier MD   [facility] ULTRA TEST strip  11/5/15  Yes Provider, Historical   LANTUS 100 unit/mL injection 90 Units.  11/29/15  Yes Provider, Historical   Insulin Syringe-Needle U-100 1 mL 31 x 5/16\" syrg  11/10/15  Yes Provider, Historical     Allergies Allergen Reactions    Atorvastatin Other (comments)     Violent vomiting      Hydrocortisone Other (comments) and Unknown (comments)     Made wound worse when applied  Made wound worse when applied      Macrobid [Nitrofurantoin Monohyd/M-Cryst] Anaphylaxis    Methocarbamol Other (comments) and Nausea and Vomiting    Bacitracin Other (comments) and Unknown (comments)    Celexa [Citalopram] Other (comments)    Diflucan [Fluconazole] Nausea and Vomiting    Flagyl [Metronidazole] Hives    Lisinopril Other (comments)     Low BP    Metformin Other (comments)    Metoclopramide Other (comments) and Unknown (comments)    Neomycin-Bacitracnzn-Polymyxnb Unknown (comments)    Neosporin [Benzalkonium Chloride] Other (comments)    Pcn [Penicillins] Hives    Silver Rash     Silver tape    Sulfa (Sulfonamide Antibiotics) Hives    Sulfamethoxazole-Trimethoprim Unknown (comments)         I affirm this is a Patient-Initiated Episode with a Patient who has not had a related appointment within my department in the past 7 days or scheduled within the next 24 hours.     Total Time: minutes: 21-30 minutes    Note: not billable if this call serves to triage the patient into an appointment for the relevant concern      Ivory Rodriguez MD

## 2020-06-26 ENCOUNTER — TELEPHONE (OUTPATIENT)
Dept: FAMILY MEDICINE CLINIC | Age: 55
End: 2020-06-26

## 2020-06-26 NOTE — TELEPHONE ENCOUNTER
Faxed labs to pt's desired [facility], pt voiced that [address] [place] [address] has already received it.

## 2020-06-26 NOTE — TELEPHONE ENCOUNTER
Message from [facility]    Dr. Zackery Phelps   Received: Today   Message Contents   Misty Perea [address] Office Pool   Phone Number: [phone]             Pt checking on status of lab order that should be sent to [facility].(Michael/Katiana mayberry). Would like to confirm it has been done.

## 2020-06-27 LAB
ALBUMIN SERPL-MCNC: 4.4 G/DL (ref 3.8–4.9)
ALBUMIN/GLOB SERPL: 1.6 {RATIO} (ref 1.2–2.2)
ALP SERPL-CCNC: 60 IU/L (ref 39–117)
ALT SERPL-CCNC: 29 IU/L (ref 0–32)
AST SERPL-CCNC: 25 IU/L (ref 0–40)
BASOPHILS # BLD AUTO: 0.1 X10E3/UL (ref 0–0.2)
BASOPHILS NFR BLD AUTO: 1 %
BILIRUB SERPL-MCNC: 0.2 MG/DL (ref 0–1.2)
BUN SERPL-MCNC: 20 MG/DL (ref 6–24)
BUN/CREAT SERPL: 27 (ref 9–23)
CALCIUM SERPL-MCNC: 11 MG/DL (ref 8.7–10.2)
CHLORIDE SERPL-SCNC: 102 MMOL/L (ref 96–106)
CHOLEST SERPL-MCNC: 300 MG/DL (ref 100–199)
CO2 SERPL-SCNC: 26 MMOL/L (ref 20–29)
CREAT SERPL-MCNC: 0.73 MG/DL (ref 0.57–1)
EOSINOPHIL # BLD AUTO: 0.3 X10E3/UL (ref 0–0.4)
EOSINOPHIL NFR BLD AUTO: 3 %
ERYTHROCYTE [DISTWIDTH] IN BLOOD BY AUTOMATED COUNT: 14 % (ref 11.7–15.4)
EST. AVERAGE GLUCOSE BLD GHB EST-MCNC: 189 MG/DL
GLOBULIN SER CALC-MCNC: 2.8 G/DL (ref 1.5–4.5)
GLUCOSE SERPL-MCNC: 78 MG/DL (ref 65–99)
HBA1C MFR BLD: 8.2 % (ref 4.8–5.6)
HCT VFR BLD AUTO: 46.3 % (ref 34–46.6)
HDLC SERPL-MCNC: 48 MG/DL
HGB BLD-MCNC: 15.5 G/DL (ref 11.1–15.9)
IMM GRANULOCYTES # BLD AUTO: 0 X10E3/UL (ref 0–0.1)
IMM GRANULOCYTES NFR BLD AUTO: 0 %
INTERPRETATION, 910389: NORMAL
LDLC SERPL CALC-MCNC: ABNORMAL MG/DL (ref 0–99)
LYMPHOCYTES # BLD AUTO: 3.1 X10E3/UL (ref 0.7–3.1)
LYMPHOCYTES NFR BLD AUTO: 37 %
Lab: NORMAL
MCH RBC QN AUTO: 30.2 PG (ref 26.6–33)
MCHC RBC AUTO-ENTMCNC: 33.5 G/DL (ref 31.5–35.7)
MCV RBC AUTO: 90 FL (ref 79–97)
MONOCYTES # BLD AUTO: 0.7 X10E3/UL (ref 0.1–0.9)
MONOCYTES NFR BLD AUTO: 8 %
NEUTROPHILS # BLD AUTO: 4.3 X10E3/UL (ref 1.4–7)
NEUTROPHILS NFR BLD AUTO: 51 %
PLATELET # BLD AUTO: 313 X10E3/UL (ref 150–450)
POTASSIUM SERPL-SCNC: 5.1 MMOL/L (ref 3.5–5.2)
PROT SERPL-MCNC: 7.2 G/DL (ref 6–8.5)
RBC # BLD AUTO: 5.13 X10E6/UL (ref 3.77–5.28)
SODIUM SERPL-SCNC: 144 MMOL/L (ref 134–144)
TRIGL SERPL-MCNC: 481 MG/DL (ref 0–149)
TSH SERPL DL<=0.005 MIU/L-ACNC: 1.45 UIU/ML (ref 0.45–4.5)
VLDLC SERPL CALC-MCNC: ABNORMAL MG/DL (ref 5–40)
WBC # BLD AUTO: 8.5 X10E3/UL (ref 3.4–10.8)

## 2020-07-06 ENCOUNTER — TELEPHONE (OUTPATIENT)
Dept: FAMILY MEDICINE CLINIC | Age: 55
End: 2020-07-06

## 2020-07-06 NOTE — TELEPHONE ENCOUNTER
Reviewed [facility] record. Was seen by ophthalmology. Diagnosed with right ocular contusion and a large hematoma over the right frontal bone down to the zygoma. Also noticed possible glaucoma suspect, wanted her to follow-up in 3 weeks      Blood thinner she takes his Plavix. I believe that her last cardiac cath was over 1 year ago. She should continue this to protect her heart.   But if she would feel most comfortable stopping this for 1 week she may

## 2020-07-06 NOTE — TELEPHONE ENCOUNTER
Patient called to state, Megan Lambert went to the hospital on 7/4/2020. She tripped over something and faced planted into a plastic cat container. She was sent by ambulance to [facility] then she was sent to [facility]. Her face is black and blue and knot on her head. Patient was discharged yesterday, 7/5/2020\". She has a question for Dr. Brady Geller she wants to know should she still be taking her blood thinners?       [phone]

## 2020-07-07 ENCOUNTER — TELEPHONE (OUTPATIENT)
Dept: FAMILY MEDICINE CLINIC | Age: 55
End: 2020-07-07

## 2020-07-22 ENCOUNTER — HOSPITAL ENCOUNTER (OUTPATIENT)
Dept: MRI IMAGING | Age: 55
Discharge: HOME | End: 2020-07-22
Attending: ORTHOPAEDIC SURGERY
Payer: MEDICAID

## 2020-07-22 DIAGNOSIS — R29.898 WEAKNESS OF RIGHT LOWER EXTREMITY: ICD-10-CM

## 2020-07-22 DIAGNOSIS — M54.50 LUMBAR PAIN: ICD-10-CM

## 2020-07-22 DIAGNOSIS — M79.18 MYOFASCIAL PAIN: ICD-10-CM

## 2020-07-22 DIAGNOSIS — M47.26 OSTEOARTHRITIS OF SPINE WITH RADICULOPATHY, LUMBAR REGION: ICD-10-CM

## 2020-07-22 DIAGNOSIS — M54.31 RIGHT SIDED SCIATICA: ICD-10-CM

## 2020-07-22 PROCEDURE — 72148 MRI LUMBAR SPINE W/O DYE: CPT

## 2020-10-12 ENCOUNTER — VIRTUAL VISIT (OUTPATIENT)
Dept: FAMILY MEDICINE CLINIC | Age: 55
End: 2020-10-12
Payer: MEDICAID

## 2020-10-12 DIAGNOSIS — F41.0 PANIC: ICD-10-CM

## 2020-10-12 DIAGNOSIS — L20.9 ATOPIC DERMATITIS, UNSPECIFIED TYPE: ICD-10-CM

## 2020-10-12 DIAGNOSIS — K44.9 HIATAL HERNIA: ICD-10-CM

## 2020-10-12 DIAGNOSIS — I10 ESSENTIAL HYPERTENSION: ICD-10-CM

## 2020-10-12 DIAGNOSIS — I25.10 CORONARY ARTERY DISEASE INVOLVING NATIVE HEART WITHOUT ANGINA PECTORIS, UNSPECIFIED VESSEL OR LESION TYPE: Primary | ICD-10-CM

## 2020-10-12 DIAGNOSIS — M54.50 CHRONIC LOW BACK PAIN, UNSPECIFIED BACK PAIN LATERALITY, UNSPECIFIED WHETHER SCIATICA PRESENT: ICD-10-CM

## 2020-10-12 DIAGNOSIS — G89.29 CHRONIC LOW BACK PAIN, UNSPECIFIED BACK PAIN LATERALITY, UNSPECIFIED WHETHER SCIATICA PRESENT: ICD-10-CM

## 2020-10-12 DIAGNOSIS — J06.9 URI WITH COUGH AND CONGESTION: ICD-10-CM

## 2020-10-12 DIAGNOSIS — J30.9 ALLERGIC RHINITIS, UNSPECIFIED SEASONALITY, UNSPECIFIED TRIGGER: ICD-10-CM

## 2020-10-12 PROCEDURE — 99443 PR PHYS/QHP TELEPHONE EVALUATION 21-30 MIN: CPT | Performed by: FAMILY MEDICINE

## 2020-10-12 RX ORDER — CLOPIDOGREL BISULFATE 75 MG/1
75 TABLET ORAL DAILY
Qty: 90 TAB | Refills: 3 | Status: SHIPPED | OUTPATIENT
Start: 2020-10-12 | End: 2021-12-01

## 2020-10-12 RX ORDER — ALPRAZOLAM 0.5 MG/1
0.5 TABLET ORAL
Qty: 20 TAB | Refills: 0 | Status: SHIPPED | OUTPATIENT
Start: 2020-10-12 | End: 2021-12-01

## 2020-10-12 RX ORDER — TRIAMCINOLONE ACETONIDE 1 MG/G
CREAM TOPICAL 2 TIMES DAILY
Qty: 15 G | Refills: 0 | Status: SHIPPED | OUTPATIENT
Start: 2020-10-12 | End: 2021-12-01

## 2020-10-12 RX ORDER — CETIRIZINE HCL 10 MG
10 TABLET ORAL DAILY
Qty: 90 TAB | Refills: 3 | Status: SHIPPED | OUTPATIENT
Start: 2020-10-12 | End: 2021-01-26 | Stop reason: SDUPTHER

## 2020-10-12 RX ORDER — NALOXONE HYDROCHLORIDE 4 MG/.1ML
SPRAY NASAL
Qty: 2 EACH | Refills: 2 | Status: SHIPPED | OUTPATIENT
Start: 2020-10-12 | End: 2021-12-01

## 2020-10-12 RX ORDER — FLUTICASONE FUROATE 27.5 MCG
SPRAY, SUSPENSION (ML) NASAL
Qty: 10 G | Refills: 11 | Status: SHIPPED | OUTPATIENT
Start: 2020-10-12 | End: 2021-01-26 | Stop reason: SDUPTHER

## 2020-10-12 NOTE — PROGRESS NOTES
Mana tSovall is a 54 y.o. female evaluated via audio only technology on 10/12/2020. Consent: She and/or her health care decision maker is aware that she may receive a bill for this audio only encounter, depending on her insurance coverage, and has provided verbal consent to proceed: Yes    I communicated with the patient and/or health care decision maker about the nature and details of the following:  Assessment & Plan:     Chest pain  Had a small reversible defect on Lexiscan and elevated troponin  Told to follow-up with cardiology outpatient    Patient reported bradycardia. Do not see this mentioned in the available record but based on patient's explanation sounds like she was told this might of been a vagal response due to esophageal stricture. Was referred to gastroenterology. She was told she might be anxious contributing to her symptoms. Is taking Cymbalta 60 mg  Was prescribed Xanax by me and may and took her last tab right before this hospital stay. Requesting refill. Not unreasonable    Was given a small amount of Norco after her hospital stay. Iterated the importance of not mixing benzos and narcotics. Prescribe Norco    Also received a fax from her auto  asking to comment on whether she was impaired to drive for some unspecified condition. Patient thinks this is referring to her back pain. Filled out the form to the effect that she is taking Cymbalta 60 mg, Flexeril 10 mg, gabapentin 900 mg 3 times daily and that she reports no sedation. There is no release of information permission so we will hold onto the form until we receive a release of information form signed by patient. Did point out to patient that the near syncope episode she described in the hospital is a little concerning for her ability to drive.   She should avoid driving until this is fully worked up by her specialists      Subjective:   Mana Stovall is a 54 y.o. female who was seen for Transitions Of Care (seen at [facility] for chest pain on 9/28/2020)       presents to follow-up hospital stay [facility] 9/2810/2. Presented for chest pain or shortness of breath. Was observed for an elevated troponin. Cardiac work-up was unremarkable. Was about to be discharged when she says that her heart rate went down to 40 and she almost passed out. Was given a few possible explanations for this. Has not been using her CPAP, had a barium swallow that showed a distal esophageal stricture and possibly due to anxiety. She was told to see a gastroenterologist and her cardiologist.    Noted on her Maryhelen Plaster that she had a small reversible defect. Does not seem like intervention was planned    Prior to Admission medications    Medication Sig Start Date End Date Taking? Authorizing Provider   ALPRAZodeisy Aguilar) 0.5 mg tablet Take 1 Tab by mouth three (3) times daily as needed for Anxiety. Max Daily Amount: 1.5 mg. 10/12/20  Yes Vanessa Barnhart MD   cetirizine (ZYRTEC) 10 mg tablet Take 1 Tab by mouth daily. 10/12/20  Yes Vanessa Barnhart MD   fluticasone (Children's Flonase Sensimist) 27.5 mcg/actuation nasal spray USE 2 SPRAYS BY NASAL&nbsp;&nbsp; ONCE DAILY 10/12/20  Yes Vanessa Barnhart MD   clopidogreL (PLAVIX) 75 mg tab Take 1 Tab by mouth daily. 10/12/20  Yes Vanessa Barnhart MD   triamcinolone acetonide (KENALOG) 0.1 % topical cream Apply  to affected area two (2) times a day. use thin layer 10/12/20  Yes Vanessa Barnhart MD   naloxone [facility]) 4 mg/actuation nasal spray Use 1 spray intranasally, then discard. Repeat with new spray every 2 min as needed for opioid overdose symptoms, alternating nostrils. 10/12/20  Yes Vanessa Barnhart MD   multivitamin (ONE A DAY) tablet Take 1 Tab by mouth daily. Yes Provider, Historical   pantoprazole (PROTONIX) 40 mg tablet Take 1 Tab by mouth daily.  6/25/20  Yes Vanessa Barnhart MD   gabapentin (NEURONTIN) 300 mg capsule TAKE 3 CAPSULES BY MOUTH THREE TIMES DAILY 5/21/20  Yes Terra Quintanilla MD   nitroglycerin (NITROSTAT) 0.4 mg SL tablet 1 Tab by SubLINGual route every five (5) minutes as needed for Chest Pain. Up to 3 doses. 5/1/20  Yes Terra Quintanilla MD   fenofibrate (LOFIBRA) 160 mg tablet Take 1 Tab by mouth daily. 4/3/20  Yes Terra Quintanilla MD   DULoxetine (CYMBALTA) 60 mg capsule TAKE 1 CAPSULE BY MOUTH ONCE DAILY 4/3/20  Yes Terra Quintanilla MD   albuterol (PROVENTIL VENTOLIN) 2.5 mg /3 mL (0.083 %) nebu 3 mL by Nebulization route every four (4) hours as needed for Wheezing. 2/27/20  Yes Terra Quintanilla MD   lidocaine (LIDODERM) 5 % APPLY 1 PATCH TOPICALLY ONCE DAILY EVERY 12 HOURS  Patient taking differently: 1 Patch by TransDERmal route every twelve (12) hours as needed. APPLY 1 PATCH TOPICALLY ONCE DAILY EVERY 12 HOURS 2/19/20  Yes Terra Quintanilla MD   Nebulizer & Compressor machine 1 Each by Does Not Apply route every four (4) hours as needed for Wheezing. 2/17/20  Yes Terra Quintanilla MD   OTHER Bariatric shower chair 11/19/19  Yes Terra Quintanilla MD   OTHER Right foot AFO 11/19/19  Yes Terra Quintanilla MD   OTHER Replacement wheels for Drive walker 23/08/47  Yes Terra Quintanilla MD   TRULICITY 1.5 OW/2.5 mL sub-q pen 1.5 mg by SubCUTAneous route every seven (7) days. 11/10/19  Yes Provider, Historical   polyethylene glycol (MIRALAX) 17 gram packet Take 1 Packet by mouth two (2) times daily as needed (constipation). 9/27/19  Yes Terra Quintanilla MD   ketoconazole 1 % sham Apply to wet hair, lather, and rinse thoroughly. Use every 3 to 4 days for up to 8 weeks 8/12/19  Yes Terra Quintanilla MD   insulin lispro (HUMALOG U-100 INSULIN) 100 unit/mL injection 30 Units by SubCUTAneous route Before breakfast, lunch, and dinner. Patient taking differently: 45 Units by SubCUTAneous route Before breakfast, lunch, and dinner. 2/18/19  Yes Terra Quintanilla MD   ondansetron (ZOFRAN ODT) 8 mg disintegrating tablet Take 1 Tab by mouth every eight (8) hours as needed for Nausea.  11/30/18  Yes Alannah Swann MD   Walker [facility] Please dispense a rolling walkerIt M54.5, G89.29, R26.9 10/18/18  Yes Alannah Swann MD   aspirin 81 mg chewable tablet Take 325 mg by mouth daily. Yes Other, MD Belle   Blood Pressure Monitor (BLOOD PRESSURE KIT) kit Check blood pressure daily 8/30/18  Yes Cristobal Guerrier MD   [facility] ULTRA TEST strip  11/5/15  Yes Provider, Historical   LANTUS 100 unit/mL injection 100 Units by SubCUTAneous route two (2) times a day. 11/29/15  Yes Provider, Historical   Insulin Syringe-Needle U-100 1 mL 31 x 5/16\" syrg  11/10/15  Yes Provider, Historical   cyclobenzaprine (FLEXERIL) 5 mg tablet Take 1 Tab by mouth nightly as needed for Muscle Spasm(s). 6/25/20   Alannah Swann MD   metoprolol tartrate (LOPRESSOR) 25 mg tablet Take 12.5 mg by mouth two (2) times a day. 8/16/18   Provider, Historical   oxyCODONE IR (ROXICODONE) 5 mg immediate release tablet  6/5/20   Provider, Historical   famotidine (PEPCID) 20 mg tablet Take 20 mg by mouth two (2) times a day. Provider, Historical   hydroCHLOROthiazide (HYDRODIURIL) 25 mg tablet Take 25 mg by mouth daily. Provider, Historical   diphenhydrAMINE (BENADRYL) 25 mg capsule Take 25 mg by mouth every six (6) hours as needed. 2/17/20   Provider, Historical   dicyclomine (BENTYL) 10 mg capsule TAKE 1 CAPSULE BY MOUTH THREE TIMES DAILY AS NEEDED 12/18/19   Alannah Swann MD   latanoprost (XALATAN) 0.005 % ophthalmic solution Apply 1 Drop to eye daily. 10/31/19   Provider, Historical   metFORMIN ER (GLUCOPHAGE XR) 500 mg tablet Take 500 mg by mouth daily. 8/20/19   Provider, Historical   diphenhydrAMINE (BENADRYL) 2 % topical cream Apply  to affected area three (3) times daily as needed for Skin Irritation. 6/5/19   Alannah Swann MD   diclofenac (VOLTAREN) 1 % gel Apply  to affected area four (4) times daily. 6/5/19   Alannah Swann MD   rosuvastatin (CRESTOR) 20 mg tablet Take 1 Tab by mouth nightly.  9/10/18   Alannah Swann MD Allergies   Allergen Reactions    Atorvastatin Other (comments)     Violent vomiting      Hydrocortisone Other (comments) and Unknown (comments)     Made wound worse when applied  Made wound worse when applied      Macrobid [Nitrofurantoin Monohyd/M-Cryst] Anaphylaxis    Methocarbamol Other (comments) and Nausea and Vomiting    Bacitracin Other (comments) and Unknown (comments)    Celexa [Citalopram] Other (comments)    Diflucan [Fluconazole] Nausea and Vomiting    Flagyl [Metronidazole] Hives    Lisinopril Other (comments)     Low BP    Metformin Other (comments)    Metoclopramide Other (comments) and Unknown (comments)    Neomycin-Bacitracnzn-Polymyxnb Unknown (comments)    Neosporin [Benzalkonium Chloride] Other (comments)    Pcn [Penicillins] Hives    Silver Rash     Silver tape    Sulfa (Sulfonamide Antibiotics) Hives    Sulfamethoxazole-Trimethoprim Unknown (comments)         I affirm this is a Patient-Initiated Episode with a Patient who has not had a related appointment within my department in the past 7 days or scheduled within the next 24 hours.     Total Time: minutes: 21-30 minutes    Note: not billable if this call serves to triage the patient into an appointment for the relevant concern      Paulo Heredia MD

## 2020-10-12 NOTE — PROGRESS NOTES
Telephone Visit    Identified pt with two pt identifiers(name and ). Reviewed record in preparation for visit and have obtained necessary documentation. All patient medications has been reviewed. Chief Complaint   Patient presents with    Transitions Of Care     seen at [facility] for chest pain on 2020       Health Maintenance Due   Topic    Hepatitis C Screening     Pneumococcal 0-64 years (1 of 1 - PPSV23)    Foot Exam Q1     Eye Exam Retinal or Dilated     DTaP/Tdap/Td series (1 - Tdap)    PAP AKA CERVICAL CYTOLOGY     Shingrix Vaccine Age 50> (1 of 2)    FOBT Q1Y Age 54-65     MICROALBUMIN Q1     Flu Vaccine (1)       There were no vitals filed for this visit. 4.Have you been to the ER, urgent care clinic since your last visit? Hospitalized since your last visit? [facility] for chest pain    5. Have you seen or consulted any other health care providers outside of the [address] [place] [address] since your last visit? Include any pap smears or colon screening. No    Patient is accompanied by self I have received verbal consent from [address] [place] [address] to discuss any/all medical information while they are present in the room.

## 2020-10-15 DIAGNOSIS — J45.20 MILD INTERMITTENT ASTHMA WITHOUT COMPLICATION: ICD-10-CM

## 2020-10-15 DIAGNOSIS — M79.2 NEUROPATHIC PAIN: Primary | ICD-10-CM

## 2020-10-16 RX ORDER — LIDOCAINE 50 MG/G
PATCH TOPICAL
Qty: 60 PATCH | Refills: 2 | Status: SHIPPED | OUTPATIENT
Start: 2020-10-16 | End: 2021-08-16

## 2020-10-16 RX ORDER — ALBUTEROL SULFATE 0.83 MG/ML
2.5 SOLUTION RESPIRATORY (INHALATION)
Qty: 40 EACH | Refills: 2 | Status: SHIPPED | OUTPATIENT
Start: 2020-10-16 | End: 2021-12-01

## 2020-10-16 NOTE — TELEPHONE ENCOUNTER
Received records request from  working for [employer] and [employer]" requesting a medical form. Patient had had told me to expect this form at our visit on 10/12. The patient permission to release information appears to have been sent via email without a physical signature. I think the combination of written permission and verbal permission would be sufficient to fax the form as requested    The other issue is that medical form asking me to comment on patient's \"medical condition\" and whether or not this impairments limits her ability to drive. Patient thinks that this form needs to be filled out because \"I have a disability sticker for my back pain\". Filled out the form to the effect that she is taking several sedative medication but does not complain about the side effect of sedation.   Based on her report she may be safe to drive

## 2020-10-27 ENCOUNTER — TELEPHONE (OUTPATIENT)
Dept: FAMILY MEDICINE CLINIC | Age: 55
End: 2020-10-27

## 2020-10-27 NOTE — TELEPHONE ENCOUNTER
----- Message from Mayra Cuevas sent at 10/27/2020  3:05 PM EDT -----  Regarding: Dr. Shemar Davila first and last name: Yumiko Baca (Auto Ins Agent)  Reason for call: Fax Request  Callback required yes/no and why: Yes  Best contact number(s): [phone]  Details to clarify the request: Pt's auto insurance rep Yumiko Baca following-up on fax request for [facility] to be faxed to [phone].

## 2020-10-27 NOTE — TELEPHONE ENCOUNTER
Message from Rebecca Whitehead/Telephone   Received: Today   Message Contents   [facility] Front Office Pool               Caller's first and last name: Pt   Reason for call: Ins paperwork to be signed   Callback required yes/no and why: Yes, once signed   Best contact number(s): [phone]   Details to clarify the request: Pt needs paper work stating that she is \"ok to drive\" and sent to: ATTN: Helen Aguilar w/ [employer] phone: [phone] (pt did not have fax number).

## 2020-10-29 NOTE — TELEPHONE ENCOUNTER
She is referring to a form I filled out. It has been scanned to media.  Dated 10/16    Perhaps it needs to be faxed again

## 2020-11-05 ENCOUNTER — VIRTUAL VISIT (OUTPATIENT)
Dept: FAMILY MEDICINE CLINIC | Age: 55
End: 2020-11-05
Payer: MEDICAID

## 2020-11-05 DIAGNOSIS — M54.50 CHRONIC LOW BACK PAIN, UNSPECIFIED BACK PAIN LATERALITY, UNSPECIFIED WHETHER SCIATICA PRESENT: ICD-10-CM

## 2020-11-05 DIAGNOSIS — J45.20 MILD INTERMITTENT ASTHMA WITHOUT COMPLICATION: ICD-10-CM

## 2020-11-05 DIAGNOSIS — M79.2 NEUROPATHIC PAIN: ICD-10-CM

## 2020-11-05 DIAGNOSIS — G89.29 CHRONIC LOW BACK PAIN, UNSPECIFIED BACK PAIN LATERALITY, UNSPECIFIED WHETHER SCIATICA PRESENT: ICD-10-CM

## 2020-11-05 DIAGNOSIS — I25.10 CORONARY ARTERY DISEASE INVOLVING NATIVE HEART WITHOUT ANGINA PECTORIS, UNSPECIFIED VESSEL OR LESION TYPE: ICD-10-CM

## 2020-11-05 DIAGNOSIS — I10 ESSENTIAL HYPERTENSION: Primary | ICD-10-CM

## 2020-11-05 DIAGNOSIS — R53.83 FATIGUE, UNSPECIFIED TYPE: ICD-10-CM

## 2020-11-05 PROCEDURE — 99443 PR PHYS/QHP TELEPHONE EVALUATION 21-30 MIN: CPT | Performed by: FAMILY MEDICINE

## 2020-11-05 RX ORDER — ALBUTEROL SULFATE 90 UG/1
1 AEROSOL, METERED RESPIRATORY (INHALATION)
Qty: 1 INHALER | Refills: 3 | Status: SHIPPED | OUTPATIENT
Start: 2020-11-05 | End: 2021-12-01

## 2020-11-05 RX ORDER — ASPIRIN 325 MG
50000 TABLET, DELAYED RELEASE (ENTERIC COATED) ORAL
COMMUNITY
End: 2021-06-22 | Stop reason: SDUPTHER

## 2020-11-05 RX ORDER — FUROSEMIDE 40 MG/1
40 TABLET ORAL AS NEEDED
COMMUNITY

## 2020-11-05 RX ORDER — AMOXICILLIN AND CLAVULANATE POTASSIUM 875; 125 MG/1; MG/1
TABLET, FILM COATED ORAL
COMMUNITY
Start: 2020-11-03 | End: 2021-04-13 | Stop reason: ALTCHOICE

## 2020-11-05 RX ORDER — NITROGLYCERIN 0.4 MG/1
0.4 TABLET SUBLINGUAL
Qty: 30 TAB | Refills: 1 | Status: SHIPPED | OUTPATIENT
Start: 2020-11-05

## 2020-11-05 RX ORDER — LISINOPRIL 2.5 MG/1
2.5 TABLET ORAL DAILY
COMMUNITY
End: 2021-12-01

## 2020-11-05 NOTE — PROGRESS NOTES
Noemi Stovall is a 54 y.o. female evaluated via audio only technology on 11/5/2020. Consent: She and/or her health care decision maker is aware that she may receive a bill for this audio only encounter, depending on her insurance coverage, and has provided verbal consent to proceed: Yes    I communicated with the patient and/or health care decision maker about the nature and details of the following:  Assessment & Plan:     Fatigue, mental fogging  Almost certainly a drug side effect  She is more open to trial of altering medication. Has been resistant in the past because \"I have taken all these medicines since 2011\"  Plan will be to reduce gabapentin to 600 mg 3 times daily (from 900 mg 3 times daily)  Continue Cymbalta 60 mg twice daily    Likely will be reducing Cymbalta next    Suggest she follow-up in 12 weeks on medication change  12  Subjective:   Tomas Stovall is a 54 y.o. female who was seen for Hospital Follow Up      Has had 2 hospital stays since our last spoke with her on 10/12. She was admitted to [facility] 10/2010/26 for fatigue and a feeling of heaviness in her extremities. Did not appear to be having any acute coronary issues. Had some bradycardia and her beta-blocker was stopped. This helped her vital signs but did not help her symptoms. Ultimately try decreasing gabapentin and reducing Cymbalta from 60 mg twice daily to daily. This was toward the end of her hospital stay and while she reported feeling better does not sound like this was tried for long enough to be definitive. She also gained 20 pounds as part of this hospitalization. Was only out of the hospital for a day or so before she went into [facility] at the direction of her cardiologist to be diuresed. This was rough, had an ANGELINA due to the diuresis. Is now taking 40 mg Lasix. Still having occasional mental fog but does not have the way to fatigue that brought her to the hospital in the first place.     She is taking gabapentin 900 mg 3 times daily, Cymbalta 60 mg twice daily  Flexeril is only taken a few times per month, Xanax a few times per month, reports not taking much if any of the oxycodone that was prescribed by the hospital.    Denies taking any combination of these as needed medicines in the days leading up to her hospital stay for fatigue    Prior to Admission medications    Medication Sig Start Date End Date Taking? Authorizing Provider   furosemide (LASIX) 40 mg tablet Take 40 mg by mouth daily. Yes Provider, Historical   cholecalciferol (VITAMIN D3) (50,000 UNITS /1250 MCG) capsule Take 50,000 Units by mouth every seven (7) days. Yes Provider, Historical   lisinopriL (PRINIVIL, ZESTRIL) 2.5 mg tablet Take 2.5 mg by mouth daily. Yes Provider, Historical   amoxicillin-clavulanate (AUGMENTIN) 875-125 mg per tablet  11/3/20  Yes Provider, Historical   nitroglycerin (NITROSTAT) 0.4 mg SL tablet 1 Tab by SubLINGual route every five (5) minutes as needed for Chest Pain. Up to 3 doses. 11/5/20  Yes Alannah Swann MD   albuterol (PROVENTIL HFA, VENTOLIN HFA, PROAIR HFA) 90 mcg/actuation inhaler Take 1 Puff by inhalation every four (4) hours as needed for Wheezing. 11/5/20  Yes Alannah Swann MD   albuterol (PROVENTIL VENTOLIN) 2.5 mg /3 mL (0.083 %) nebu 3 mL by Nebulization route every four (4) hours as needed for Wheezing. 10/16/20  Yes Alannah Swann MD   lidocaine (LIDODERM) 5 % APPLY 1 PATCH TOPICALLY ONCE DAILY EVERY 12 HOURS 10/16/20  Yes Alannah Swann MD   ALPRAZolam Kim Pennant) 0.5 mg tablet Take 1 Tab by mouth three (3) times daily as needed for Anxiety. Max Daily Amount: 1.5 mg. 10/12/20  Yes Alannah Swann MD   cetirizine (ZYRTEC) 10 mg tablet Take 1 Tab by mouth daily. 10/12/20  Yes Alannah Swann MD   fluticasone (Children's Flonase Sensimist) 27.5 mcg/actuation nasal spray USE 2 SPRAYS BY NASAL   ONCE DAILY 10/12/20  Yes Alannah Swann MD   clopidogreL (PLAVIX) 75 mg tab Take 1 Tab by mouth daily. 10/12/20  Yes Jeffery Araujo MD   triamcinolone acetonide (KENALOG) 0.1 % topical cream Apply  to affected area two (2) times a day. use thin layer 10/12/20  Yes Jeffery Araujo MD   naloxone [facility]) 4 mg/actuation nasal spray Use 1 spray intranasally, then discard. Repeat with new spray every 2 min as needed for opioid overdose symptoms, alternating nostrils. 10/12/20  Yes Jeffery Araujo MD   pantoprazole (PROTONIX) 40 mg tablet Take 1 Tab by mouth daily. 6/25/20  Yes Jeffery Araujo MD   cyclobenzaprine (FLEXERIL) 5 mg tablet Take 1 Tab by mouth nightly as needed for Muscle Spasm(s). 6/25/20  Yes Jeffery Araujo MD   metoprolol tartrate (LOPRESSOR) 25 mg tablet Take 12.5 mg by mouth two (2) times a day. 8/16/18  Yes Provider, Historical   oxyCODONE IR (ROXICODONE) 5 mg immediate release tablet  6/5/20  Yes Provider, Historical   gabapentin (NEURONTIN) 300 mg capsule TAKE 3 CAPSULES BY MOUTH THREE TIMES DAILY 5/21/20  Yes Jeffery Araujo MD   fenofibrate (LOFIBRA) 160 mg tablet Take 1 Tab by mouth daily. 4/3/20  Yes Jeffery Araujo MD   DULoxetine (CYMBALTA) 60 mg capsule TAKE 1 CAPSULE BY MOUTH ONCE DAILY 4/3/20  Yes Jeffery Araujo MD   diphenhydrAMINE (BENADRYL) 25 mg capsule Take 25 mg by mouth every six (6) hours as needed. 2/17/20  Yes Provider, Historical   Nebulizer & Compressor machine 1 Each by Does Not Apply route every four (4) hours as needed for Wheezing. 2/17/20  Yes Jeffery Araujo MD   latanoprost (XALATAN) 0.005 % ophthalmic solution Apply 1 Drop to eye daily. 10/31/19  Yes Provider, Historical   TRULICITY 1.5 VL/7.4 mL sub-q pen 1.5 mg by SubCUTAneous route every seven (7) days. 11/10/19  Yes Provider, Historical   polyethylene glycol (MIRALAX) 17 gram packet Take 1 Packet by mouth two (2) times daily as needed (constipation). 9/27/19  Yes Jeffery Araujo MD   ketoconazole 1 % sham Apply to wet hair, lather, and rinse thoroughly.  Use every 3 to 4 days for up to 8 weeks 8/12/19  Yes Kimmie Portillo, Sofya Fernando MD   diclofenac (VOLTAREN) 1 % gel Apply  to affected area four (4) times daily. 6/5/19  Yes Stephanie Araujo MD   insulin lispro (HUMALOG U-100 INSULIN) 100 unit/mL injection 30 Units by SubCUTAneous route Before breakfast, lunch, and dinner. Patient taking differently: 40 Units by SubCUTAneous route Before breakfast, lunch, and dinner. 2/18/19  Yes Stephanie Araujo MD   ondansetron (ZOFRAN ODT) 8 mg disintegrating tablet Take 1 Tab by mouth every eight (8) hours as needed for Nausea. 11/30/18  Yes Stephanie Araujo MD   Walker misc Please dispense a rolling walkerIt M54.5, G89.29, R26.9 10/18/18  Yes Stephanie Araujo MD   aspirin 81 mg chewable tablet Take 325 mg by mouth daily. Yes Other, MD Belle   ONETOUCH ULTRA TEST strip  11/5/15  Yes Provider, Historical   LANTUS 100 unit/mL injection 100 Units by SubCUTAneous route two (2) times a day.  11/29/15  Yes Provider, Historical   Insulin Syringe-Needle U-100 1 mL 31 x 5/16\" syrg  11/10/15  Yes Provider, Historical     Allergies   Allergen Reactions    Atorvastatin Other (comments)     Violent vomiting      Hydrocortisone Other (comments) and Unknown (comments)     Made wound worse when applied  Made wound worse when applied      Macrobid [Nitrofurantoin Monohyd/M-Cryst] Anaphylaxis    Methocarbamol Other (comments) and Nausea and Vomiting    Bacitracin Other (comments) and Unknown (comments)    Celexa [Citalopram] Other (comments)    Diflucan [Fluconazole] Nausea and Vomiting    Flagyl [Metronidazole] Hives    Lisinopril Other (comments)     Low BP    Metformin Other (comments)    Metoclopramide Other (comments) and Unknown (comments)    Neomycin-Bacitracnzn-Polymyxnb Unknown (comments)    Neosporin [Benzalkonium Chloride] Other (comments)    Pcn [Penicillins] Hives    Silver Rash     Silver tape    Sulfa (Sulfonamide Antibiotics) Hives    Sulfamethoxazole-Trimethoprim Unknown (comments)         I affirm this is a Patient-Initiated Episode with a Patient who has not had a related appointment within my department in the past 7 days or scheduled within the next 24 hours.     Total Time: minutes: 21-30 minutes    Note: not billable if this call serves to triage the patient into an appointment for the relevant concern      Liv Garsia MD

## 2020-11-05 NOTE — PROGRESS NOTES
Chief Complaint   Patient presents with   [facility] Follow Up     Health Maintenance reviewed     1. Have you been to the ER, urgent care clinic since your last visit? Hospitalized since your last visit? Yes    2. Have you seen or consulted any other health care providers outside of the [address] [place] [address] since your last visit? Include any pap smears or colon screening.    No

## 2020-11-25 DIAGNOSIS — M79.2 NEUROPATHIC PAIN: ICD-10-CM

## 2020-11-27 RX ORDER — GABAPENTIN 300 MG/1
CAPSULE ORAL
Qty: 270 CAP | Refills: 0 | Status: SHIPPED | OUTPATIENT
Start: 2020-11-27 | End: 2021-01-26

## 2020-12-11 DIAGNOSIS — M54.50 CHRONIC LOW BACK PAIN, UNSPECIFIED BACK PAIN LATERALITY, UNSPECIFIED WHETHER SCIATICA PRESENT: ICD-10-CM

## 2020-12-11 DIAGNOSIS — G89.29 CHRONIC LOW BACK PAIN, UNSPECIFIED BACK PAIN LATERALITY, UNSPECIFIED WHETHER SCIATICA PRESENT: ICD-10-CM

## 2020-12-11 RX ORDER — CYCLOBENZAPRINE HCL 5 MG
TABLET ORAL
Qty: 30 TAB | Refills: 3 | Status: SHIPPED | OUTPATIENT
Start: 2020-12-11 | End: 2021-05-04

## 2021-01-26 DIAGNOSIS — J06.9 URI WITH COUGH AND CONGESTION: ICD-10-CM

## 2021-01-26 DIAGNOSIS — J30.9 ALLERGIC RHINITIS, UNSPECIFIED SEASONALITY, UNSPECIFIED TRIGGER: ICD-10-CM

## 2021-01-26 DIAGNOSIS — M79.2 NEUROPATHIC PAIN: ICD-10-CM

## 2021-01-26 DIAGNOSIS — R11.0 NAUSEA: ICD-10-CM

## 2021-01-26 RX ORDER — ONDANSETRON 8 MG/1
8 TABLET, ORALLY DISINTEGRATING ORAL
Qty: 30 TAB | Refills: 2 | Status: SHIPPED | OUTPATIENT
Start: 2021-01-26

## 2021-01-26 RX ORDER — GUAIFENESIN 100 MG/5ML
325 LIQUID (ML) ORAL DAILY
Qty: 90 TAB | Refills: 3 | Status: SHIPPED | OUTPATIENT
Start: 2021-01-26 | End: 2021-05-12

## 2021-01-26 RX ORDER — CETIRIZINE HCL 10 MG
10 TABLET ORAL DAILY
Qty: 90 TAB | Refills: 3 | Status: SHIPPED | OUTPATIENT
Start: 2021-01-26 | End: 2021-12-01

## 2021-01-26 RX ORDER — GABAPENTIN 300 MG/1
CAPSULE ORAL
Qty: 270 CAP | Refills: 0 | Status: SHIPPED | OUTPATIENT
Start: 2021-01-26 | End: 2021-03-04

## 2021-01-26 RX ORDER — FLUTICASONE FUROATE 27.5 MCG
SPRAY, SUSPENSION (ML) NASAL
Qty: 10 G | Refills: 11 | Status: SHIPPED | OUTPATIENT
Start: 2021-01-26 | End: 2021-12-01 | Stop reason: SDUPTHER

## 2021-01-26 NOTE — TELEPHONE ENCOUNTER
Pt is calling requesting a refill on med    Requested Prescriptions     Pending Prescriptions Disp Refills    ondansetron (ZOFRAN ODT) 8 mg disintegrating tablet 30 Tab 2     Sig: Take 1 Tab by mouth every eight (8) hours as needed for Nausea.  aspirin 81 mg chewable tablet        Sig: Take 4 Tabs by mouth daily.  fluticasone (Children's Flonase Sensimist) 27.5 mcg/actuation nasal spray 10 g 11     Sig: USE 2 SPRAYS BY NASAL&nbsp;&nbsp; ONCE DAILY    cetirizine (ZYRTEC) 10 mg tablet 90 Tab 3     Sig: Take 1 Tab by mouth daily.

## 2021-02-25 ENCOUNTER — DOCUMENTATION ONLY (OUTPATIENT)
Dept: FAMILY MEDICINE CLINIC | Age: 56
End: 2021-02-25

## 2021-02-25 NOTE — PROGRESS NOTES
[facility] CMN for durable medical was put on [facility]note 2[facility] YARI desk to process

## 2021-03-01 ENCOUNTER — TELEPHONE (OUTPATIENT)
Dept: FAMILY MEDICINE CLINIC | Age: 56
End: 2021-03-01

## 2021-03-01 NOTE — TELEPHONE ENCOUNTER
Message from [facility]    Dr Whitehead/ telephone  Received: Today  Message Contents   [facility] Bec[facility] Front Office Pool             General Message/Vendor Calls     Caller's first and last name: Yeison King of [employer]       Reason for call: Medical Necessity for incontinent supplies         Callback required yes/no and why: yes for approval       Best contact number(s): [phone]       Details to clarify the request: Ms Yeison King is requesting a call back for Medical Necessity approval for incontinent supplies for pt.

## 2021-03-02 ENCOUNTER — TELEPHONE (OUTPATIENT)
Dept: FAMILY MEDICINE CLINIC | Age: 56
End: 2021-03-02

## 2021-03-02 NOTE — TELEPHONE ENCOUNTER
[facility] CMN for durable medical was signed & faxed to [phone],ok,Copy placed in scan folder to be scanned to chart.

## 2021-03-03 DIAGNOSIS — M79.2 NEUROPATHIC PAIN: ICD-10-CM

## 2021-03-04 RX ORDER — GABAPENTIN 300 MG/1
CAPSULE ORAL
Qty: 270 CAP | Refills: 3 | Status: SHIPPED | OUTPATIENT
Start: 2021-03-04 | End: 2021-09-24

## 2021-03-04 NOTE — TELEPHONE ENCOUNTER
Requested Prescriptions Pending Prescriptions Disp Refills  gabapentin (NEURONTIN) 300 mg capsule [Pharmacy Med Name: GABAPENTIN 300 MG CAPSULE] 270 Cap 0 Sig: TAKE 3 CAPSULES BY MOUTH THREE TIMES DAILY Last office visit: 11/5/2020

## 2021-04-13 ENCOUNTER — VIRTUAL VISIT (OUTPATIENT)
Dept: FAMILY MEDICINE CLINIC | Age: 56
End: 2021-04-13

## 2021-04-13 ENCOUNTER — TELEPHONE (OUTPATIENT)
Dept: FAMILY MEDICINE CLINIC | Age: 56
End: 2021-04-13

## 2021-04-13 RX ORDER — ROPINIROLE 0.25 MG/1
TABLET, FILM COATED ORAL AS NEEDED
COMMUNITY
Start: 2021-03-30 | End: 2021-12-01

## 2021-04-13 RX ORDER — TRAMADOL HYDROCHLORIDE 50 MG/1
50-100 TABLET ORAL AS NEEDED
COMMUNITY
Start: 2021-03-31 | End: 2021-11-26

## 2021-04-13 RX ORDER — HYDROCORTISONE AND ACETIC ACID 20.75; 10.375 MG/ML; MG/ML
SOLUTION AURICULAR (OTIC)
COMMUNITY
Start: 2021-04-05 | End: 2021-04-20

## 2021-04-13 RX ORDER — HYDROCODONE BITARTRATE AND ACETAMINOPHEN 5; 325 MG/1; MG/1
TABLET ORAL
COMMUNITY
Start: 2021-02-19 | End: 2021-02-24

## 2021-04-13 NOTE — PROGRESS NOTES
1. Have you been to the ER, urgent care clinic since your last visit? Hospitalized since your last visit? Yes. [facility] ED    2. Have you seen or consulted any other health care providers outside of the [address] [place] [address] since your last visit? Include any pap smears or colon screening. Yes.  Neurology     Health Maintenance Due   Topic Date Due    Hepatitis C Screening  Never done    Pneumococcal 0-64 years (1 of 1 - PPSV23) Never done    Foot Exam Q1  Never done    Eye Exam Retinal or Dilated  Never done    COVID-19 Vaccine (1) Never done    DTaP/Tdap/Td series (1 - Tdap) Never done    PAP AKA CERVICAL CYTOLOGY  Never done    Shingrix Vaccine Age 50> (1 of 2) Never done    Colorectal Cancer Screening Combo  Never done    MICROALBUMIN Q1  07/10/2019

## 2021-04-13 NOTE — PROGRESS NOTES
On schedule as a virtual visit. Attempted to contact patient multiple times throughout the day using both numbers on chart. Straight to voicemail  This encounter was created in error - please disregard.

## 2021-04-13 NOTE — TELEPHONE ENCOUNTER
Reviewed recent available medical records. [facility], [facility] neurology 4/1 for severe pain in the arms and legs which is mainly muscular like aching and soreness. Got to the point where she could barely get out of bed. Neurologist prescribed tramadol which took the edge off and allowed her to move better but still needed a walker. Patient felt weak. MRI of her neck showed a T2 hyperintensity in the spinal cord was unclear if this was an acute problem. Exam notable for pain with resisted movement. Treated as transverse myelitis with steroids. The next day presented to the emergency room in [place] with shortness of breath and elevated blood sugar, high blood pressure, palpitations. The symptoms started after her steroid infusion. Blood sugar was 400 and blood pressure was 200/112. Labs remarkable for hemoglobin of 15, platelet 312, WBC 11, creatinine 0.78, , chest x-ray no acute process, EKG unchanged.   Was given fluids and her blood sugar came down to 250

## 2021-04-14 ENCOUNTER — TELEPHONE (OUTPATIENT)
Dept: FAMILY MEDICINE CLINIC | Age: 56
End: 2021-04-14

## 2021-04-14 NOTE — TELEPHONE ENCOUNTER
Patient calling because she did not get to speak to Dr Ramone Rudd yesterday, phone .  She would like to speak to a nurse or Dr Ramone Rudd about this or what she should do

## 2021-04-14 NOTE — TELEPHONE ENCOUNTER
Unable to reach pt, left voicemail.  Pt can schedule VV for tomorrow if she would still like to speak with Dr. Shima Dickey

## 2021-04-14 NOTE — TELEPHONE ENCOUNTER
Message from Ramin Kenney/Telephone  Received:  Today  Message Contents   [address]angel[address] Office Pool             Patient return call     Caller's first and last name and relationship (if not the patient):       Best contact number(s): [phone]       Whose call is being returned: Britni Padilla       Details to clarify the request: N/A       Vicki Severino

## 2021-04-15 ENCOUNTER — VIRTUAL VISIT (OUTPATIENT)
Dept: FAMILY MEDICINE CLINIC | Age: 56
End: 2021-04-15
Payer: MEDICAID

## 2021-04-15 DIAGNOSIS — M79.2 NEUROPATHIC PAIN: Primary | ICD-10-CM

## 2021-04-15 DIAGNOSIS — E66.01 OBESITY, MORBID (HCC): ICD-10-CM

## 2021-04-15 DIAGNOSIS — I10 ESSENTIAL HYPERTENSION: ICD-10-CM

## 2021-04-15 DIAGNOSIS — E11.21 TYPE 2 DIABETES WITH NEPHROPATHY (HCC): ICD-10-CM

## 2021-04-15 PROCEDURE — 99443 PR PHYS/QHP TELEPHONE EVALUATION 21-30 MIN: CPT | Performed by: FAMILY MEDICINE

## 2021-04-15 NOTE — PROGRESS NOTES
Chief Complaint   Patient presents with   [facility] Follow Up     Health Maintenance reviewed     1. Have you been to the ER, urgent care clinic since your last visit? Hospitalized since your last visit? No   2. Have you seen or consulted any other health care providers outside of the [address] [place] [address] since your last visit? Include any pap smears or colon screening.   No

## 2021-04-15 NOTE — PROGRESS NOTES
Charanjit Stovall is a 54 y.o. female evaluated via audio only technology on 4/15/2021. Consent: She and/or her health care decision maker is aware that she may receive a bill for this audio only encounter, depending on her insurance coverage, and has provided verbal consent to proceed: Yes    I communicated with the patient and/or health care decision maker about the nature and details of the following:  Assessment & Plan:     Side effects of steroid  Blood sugar 400  Blood pressure 200/112  Tachycardia  Anxiety    Culprit dose methylprednisone 500 mg  Previously tolerated prednisone 40 mg with only hyperglycemia. Noted this on her \"allergies\" list even though it is just a side effect    I think steroids would be reasonable to use in life-threatening situations but she should consider avoiding high-dose steroids and steroids for elective purposes    [place] noting that her main complaint is substantially improved by steroid so I think her neurologist might be onto something. Has follow-up May 11. Diabetes  Will be seeing her endocrinologist April 28  Cannot recall her most recent A1c. Might be the one that we have from June at 8.2  Blood sugar currently well controlled, fasting is less than 140  12  Subjective:   Tomas Stovall is a 54 y.o. female who was seen for Hospital Follow Up      Reviewed recent available medical records. [facility] neurology 4/1 for severe pain in the arms and legs which is mainly muscular like aching and soreness. Got to the point where she could barely get out of bed. Neurologist prescribed tramadol which took the edge off and allowed her to move better but still needed a walker. Patient felt weak. MRI of her neck showed a T2 hyperintensity in the spinal cord was unclear if this was an acute problem. Exam notable for pain with resisted movement.   Treated as transverse myelitis with steroids.     The next day presented to the emergency room in [facility] with shortness of breath and elevated blood sugar, high blood pressure, palpitations. The symptoms started after her steroid infusion. Blood sugar was 400 and blood pressure was 200/112.     Labs remarkable for hemoglobin of 15, platelet 674, WBC 11, creatinine 0.78, , chest x-ray no acute process, EKG unchanged. Was given fluids and her blood sugar came down to 250    She is feeling substantially better. Fasting blood sugar this morning was 130. Blood pressure was normal.  She was given a event monitor by her cardiologist and has had a few extra heartbeats that she has been able to record but did not feel like they were problematic. Tells me that despite the hypertension and tachycardia after her steroid infusion she did actually feel like she got good pain relief for a few days. Recalls Friday through Wednesday being the days that she felt better. She was able to stand up without pain. Did not feel quite as weak. Did have some residual pain and weakness left over. The dose of steroid she received was methylprednisone 500 mg. The plan was to repeat this dose for a total of 3 days but 1 day gave her the side effects. [place] noting that she was able to tolerate 125 mg methylprednisolone for a anaphylactic reaction 1 year ago although had some anxiety that may or may not have been related. She was able to tolerate prednisone 40 mg after that allergic reaction with blood sugars in the 300400    Prior to Admission medications    Medication Sig Start Date End Date Taking? Authorizing Provider   traMADoL (ULTRAM) 50 mg tablet Take  mg by mouth as needed. 3/31/21 11/26/21 Yes Provider, Historical   rOPINIRole (REQUIP) 0.25 mg tablet as needed.  3/30/21  Yes Provider, Historical   hydrocortisone-acetic acid (VOSOL-HC) otic solution INSTILL 3 DROPS IN AFFECTED EAR(S) 3 TO 4 TIMES DAILY 4/5/21 4/20/21 Yes Provider, Historical   gabapentin (NEURONTIN) 300 mg capsule TAKE 3 CAPSULES BY MOUTH THREE TIMES DAILY 3/4/21 Yes Cornelius Lugo MD   ondansetron [facility] ODT) 8 mg disintegrating tablet Take 1 Tab by mouth every eight (8) hours as needed for Nausea. 1/26/21  Yes Cornelius Lugo MD   aspirin 81 mg chewable tablet Take 4 Tabs by mouth daily. 1/26/21  Yes Cornelius Lugo MD   fluticasone (Children's Flonase Sensimist) 27.5 mcg/actuation nasal spray USE 2 SPRAYS BY NASAL   ONCE DAILY 1/26/21  Yes Cornelius Lugo MD   cetirizine (ZYRTEC) 10 mg tablet Take 1 Tab by mouth daily. 1/26/21  Yes Cornelius Lugo MD   cyclobenzaprine (FLEXERIL) 5 mg tablet TAKE 1 TABLET BY MOUTH NIGHTLY AS NEEDED FOR MUSCLE SPASM(S). 12/11/20  Yes Cornelius Lugo MD   furosemide (LASIX) 40 mg tablet Take 40 mg by mouth as needed. Yes Provider, Historical   cholecalciferol (VITAMIN D3) (50,000 UNITS /1250 MCG) capsule Take 50,000 Units by mouth every seven (7) days. Yes Provider, Historical   lisinopriL (PRINIVIL, ZESTRIL) 2.5 mg tablet Take 2.5 mg by mouth daily. Yes Provider, Historical   nitroglycerin (NITROSTAT) 0.4 mg SL tablet 1 Tab by SubLINGual route every five (5) minutes as needed for Chest Pain. Up to 3 doses. 11/5/20  Yes Cornelius Lugo MD   albuterol (PROVENTIL HFA, VENTOLIN HFA, PROAIR HFA) 90 mcg/actuation inhaler Take 1 Puff by inhalation every four (4) hours as needed for Wheezing. 11/5/20  Yes Cornelius Lugo MD   albuterol (PROVENTIL VENTOLIN) 2.5 mg /3 mL (0.083 %) nebu 3 mL by Nebulization route every four (4) hours as needed for Wheezing. 10/16/20  Yes Cornelius Lugo MD   lidocaine (LIDODERM) 5 % APPLY 1 PATCH TOPICALLY ONCE DAILY EVERY 12 HOURS 10/16/20  Yes Cornelius Lugo MD   ALPRAZolam [facility]) 0.5 mg tablet Take 1 Tab by mouth three (3) times daily as needed for Anxiety. Max Daily Amount: 1.5 mg. 10/12/20  Yes Cornelius Lugo MD   clopidogreL (PLAVIX) 75 mg tab Take 1 Tab by mouth daily.  10/12/20  Yes Cornelius Lugo MD   triamcinolone acetonide (KENALOG) 0.1 % topical cream Apply  to affected area two (2) times a day. use thin layer 10/12/20  Yes Byron Corley MD   naloxone [facility]) 4 mg/actuation nasal spray Use 1 spray intranasally, then discard. Repeat with new spray every 2 min as needed for opioid overdose symptoms, alternating nostrils. 10/12/20  Yes Byron Corley MD   pantoprazole (PROTONIX) 40 mg tablet Take 1 Tab by mouth daily. 6/25/20  Yes Byron Corley MD   metoprolol tartrate (LOPRESSOR) 25 mg tablet Take 25 mg by mouth daily. 8/16/18  Yes Provider, Historical   oxyCODONE IR (ROXICODONE) 5 mg immediate release tablet  6/5/20  Yes Provider, Historical   fenofibrate (LOFIBRA) 160 mg tablet Take 1 Tab by mouth daily. 4/3/20  Yes Byron Corley MD   DULoxetine (CYMBALTA) 60 mg capsule TAKE 1 CAPSULE BY MOUTH ONCE DAILY 4/3/20  Yes Byron Corley MD   diphenhydrAMINE (BENADRYL) 25 mg capsule Take 25 mg by mouth every six (6) hours as needed. 2/17/20  Yes Provider, Historical   Nebulizer & Compressor machine 1 Each by Does Not Apply route every four (4) hours as needed for Wheezing. 2/17/20  Yes Byron Corley MD   latanoprost (XALATAN) 0.005 % ophthalmic solution Apply 1 Drop to eye daily. 10/31/19  Yes Provider, Historical   TRULICITY 1.5 HK/4.8 mL sub-q pen 1.5 mg by SubCUTAneous route every seven (7) days. 11/10/19  Yes Provider, Historical   polyethylene glycol (MIRALAX) 17 gram packet Take 1 Packet by mouth two (2) times daily as needed (constipation). 9/27/19  Yes Byron Corley MD   ketoconazole 1 % sham Apply to wet hair, lather, and rinse thoroughly. Use every 3 to 4 days for up to 8 weeks 8/12/19  Yes Byron Corley MD   diclofenac (VOLTAREN) 1 % gel Apply  to affected area four (4) times daily. 6/5/19  Yes Byron Corley MD   insulin lispro (HUMALOG U-100 INSULIN) 100 unit/mL injection 30 Units by SubCUTAneous route Before breakfast, lunch, and dinner. Patient taking differently: 40 Units by SubCUTAneous route Before breakfast, lunch, and dinner.  2/18/19  Yes MD Bowen Onofre misc Please dispense a rolling walkerIt M54.5, G89.29, R26.9 10/18/18  Yes Celina Childers MD   [facility] ULTRA TEST strip  11/5/15  Yes Provider, Historical   LANTUS 100 unit/mL injection 90 Units by SubCUTAneous route two (2) times a day. 11/29/15  Yes Provider, Historical   Insulin Syringe-Needle U-100 1 mL 31 x 5/16\" syrg  11/10/15  Yes Provider, Historical     Allergies   Allergen Reactions    Atorvastatin Other (comments)     Violent vomiting      Hydrocortisone Other (comments) and Unknown (comments)     Made wound worse when applied  Made wound worse when applied      Macrobid [Nitrofurantoin Monohyd/M-Cryst] Anaphylaxis    Methocarbamol Other (comments) and Nausea and Vomiting    Bacitracin Other (comments) and Unknown (comments)    Celexa [Citalopram] Other (comments)    Diflucan [Fluconazole] Nausea and Vomiting    Flagyl [Metronidazole] Hives    Lisinopril Other (comments)     Low BP    Metformin Other (comments)    Metoclopramide Other (comments) and Unknown (comments)    Neomycin-Bacitracnzn-Polymyxnb Unknown (comments)    Neosporin [Benzalkonium Chloride] Other (comments)    Pcn [Penicillins] Hives    Silver Rash     Silver tape    Sulfa (Sulfonamide Antibiotics) Hives    Sulfamethoxazole-Trimethoprim Unknown (comments)         I affirm this is a Patient-Initiated Episode with a Patient who has not had a related appointment within my department in the past 7 days or scheduled within the next 24 hours.     Total Time: minutes: 21-30 minutes    Note: not billable if this call serves to triage the patient into an appointment for the relevant concern      Kamini Ashley MD

## 2021-04-21 ENCOUNTER — TELEPHONE (OUTPATIENT)
Dept: FAMILY MEDICINE CLINIC | Age: 56
End: 2021-04-21

## 2021-04-21 NOTE — TELEPHONE ENCOUNTER
Pt is calling stating she has discovered what is making her ill states it is the pantoprazole (PROTONIX)    States she has stopped taking and she feels better didn't know if you wanted to prescribe something else

## 2021-04-21 NOTE — TELEPHONE ENCOUNTER
Protonix is for reflux. Stopping it may give her reflux and chest pain. Other prescription medicines of this class may have similar side effects.   Over-the-counter famotidine is a different class of medication it may be helpful if she is having reflux

## 2021-05-03 DIAGNOSIS — G89.29 CHRONIC LOW BACK PAIN, UNSPECIFIED BACK PAIN LATERALITY, UNSPECIFIED WHETHER SCIATICA PRESENT: ICD-10-CM

## 2021-05-03 DIAGNOSIS — M54.50 CHRONIC LOW BACK PAIN, UNSPECIFIED BACK PAIN LATERALITY, UNSPECIFIED WHETHER SCIATICA PRESENT: ICD-10-CM

## 2021-05-04 RX ORDER — CYCLOBENZAPRINE HCL 5 MG
TABLET ORAL
Qty: 30 TAB | Refills: 3 | Status: SHIPPED | OUTPATIENT
Start: 2021-05-04

## 2021-05-10 RX ORDER — DULOXETIN HYDROCHLORIDE 60 MG/1
CAPSULE, DELAYED RELEASE ORAL
Qty: 90 CAP | Refills: 1 | Status: SHIPPED | OUTPATIENT
Start: 2021-05-10 | End: 2021-12-01 | Stop reason: SDUPTHER

## 2021-05-12 RX ORDER — GUAIFENESIN 100 MG/5ML
LIQUID (ML) ORAL
Qty: 90 TAB | Refills: 3 | Status: SHIPPED | OUTPATIENT
Start: 2021-05-12 | End: 2021-12-22

## 2021-05-19 LAB — HBA1C MFR BLD HPLC: 9.4 %

## 2021-06-18 ENCOUNTER — VIRTUAL VISIT (OUTPATIENT)
Dept: FAMILY MEDICINE CLINIC | Age: 56
End: 2021-06-18
Payer: MEDICAID

## 2021-06-18 DIAGNOSIS — K52.9 GASTROENTERITIS: Primary | ICD-10-CM

## 2021-06-18 DIAGNOSIS — M54.50 CHRONIC LOW BACK PAIN, UNSPECIFIED BACK PAIN LATERALITY, UNSPECIFIED WHETHER SCIATICA PRESENT: ICD-10-CM

## 2021-06-18 DIAGNOSIS — R11.2 NAUSEA AND VOMITING, INTRACTABILITY OF VOMITING NOT SPECIFIED, UNSPECIFIED VOMITING TYPE: ICD-10-CM

## 2021-06-18 DIAGNOSIS — G89.29 CHRONIC LOW BACK PAIN, UNSPECIFIED BACK PAIN LATERALITY, UNSPECIFIED WHETHER SCIATICA PRESENT: ICD-10-CM

## 2021-06-18 PROCEDURE — 99443 PR PHYS/QHP TELEPHONE EVALUATION 21-30 MIN: CPT | Performed by: FAMILY MEDICINE

## 2021-06-18 NOTE — PROGRESS NOTES
Drea Stovall is a 54 y.o. female evaluated via audio only technology on 6/18/2021. Consent: She and/or her health care decision maker is aware that she may receive a bill for this audio only encounter, depending on her insurance coverage, and has provided verbal consent to proceed: Yes    I communicated with the patient and/or health care decision maker about the nature and details of the following:  Assessment & Plan:     Gastroenteritis, possibly viral  Happened about 6 hours after a trip to [place] which is where her exposure could have happened  Zofran  Hydration  Try to resume a normal diet    Seems unlikely to be a side effect of the Covid shot. She did have a muscle soreness reaction but was feeling 100% better for most of the day before these abdominal symptoms started. I did encourage her to report the symptoms to [employer]    Diabetes  Blood sugar has been in the low 100s through illness      Back pain  Reports I have been getting from neuro interventional suggests patient wants to be conservative with back pain. Patient reported today that she is going to be getting surgery and really looking forward to it. Not sure what the plan is based on these 2 disparate reports    12  Subjective:   Drea Stovall is a 54 y.o. female who was seen for Concern For COVID-19 (Coronavirus) (Recieved 2nd Dose of vaccine on Tuesday and now pt states she has body aches and nausea, she also states she's not able to eat anything or move because she's going to be sick.)      Specifically her timeline is that she received her second Covid shot on Tuesday. Felt okay for the rest of the day except for a sore arm. On Wednesday she felt like she could not move her arm it was so sore but otherwise felt okay and was able to go about her business. Thursday morning felt fine, no pain in the arm. Went to [place], had some hashbrowns.   7 hours after the trip [place] started having nausea, vomiting, stomach cramps, feeling of hot and cold. When she stood up or moved her head she would get nauseous. She vomited 3 times in rapid succession. Went to bed early, woke up late. Has felt vaguely nauseous today. Got some crackers in before our visit and they settled okay. Is drinking fluids okay. She tried Zofran last night without much relief. Has not tried it today    Prior to Admission medications    Medication Sig Start Date End Date Taking? Authorizing Provider   aspirin 81 mg chewable tablet TAKE 4 TABLETS BY MOUTH DAILY 5/12/21  Yes Yaneth Powell MD   DULoxetine (CYMBALTA) 60 mg capsule TAKE 1 CAPSULE BY MOUTH EVERY DAY 5/10/21  Yes Yaneth Powell MD   cyclobenzaprine (FLEXERIL) 5 mg tablet TAKE 1 TABLET BY MOUTH NIGHTLY AS NEEDED FOR MUSCLE SPASM(S). 5/4/21  Yes Yaneth Powell MD   traMADoL Georganne Ramin) 50 mg tablet Take  mg by mouth as needed. 3/31/21 11/26/21 Yes Provider, Historical   rOPINIRole (REQUIP) 0.25 mg tablet as needed. 3/30/21  Yes Provider, Historical   gabapentin (NEURONTIN) 300 mg capsule TAKE 3 CAPSULES BY MOUTH THREE TIMES DAILY 3/4/21  Yes Yaneth Powell MD   ondansetron [facility] ODT) 8 mg disintegrating tablet Take 1 Tab by mouth every eight (8) hours as needed for Nausea. 1/26/21  Yes Yaneth Powell MD   fluticasone (Children's Flonase Sensimist) 27.5 mcg/actuation nasal spray USE 2 SPRAYS BY NASAL   ONCE DAILY 1/26/21  Yes Yaneth Powell MD   cetirizine (ZYRTEC) 10 mg tablet Take 1 Tab by mouth daily. 1/26/21  Yes Yaneth Powell MD   furosemide (LASIX) 40 mg tablet Take 40 mg by mouth as needed. Yes Provider, Historical   cholecalciferol (VITAMIN D3) (50,000 UNITS /1250 MCG) capsule Take 50,000 Units by mouth every seven (7) days. Yes Provider, Historical   lisinopriL (PRINIVIL, ZESTRIL) 2.5 mg tablet Take 2.5 mg by mouth daily. Yes Provider, Historical   nitroglycerin (NITROSTAT) 0.4 mg SL tablet 1 Tab by SubLINGual route every five (5) minutes as needed for Chest Pain. Up to 3 doses.  11/5/20 Yes Clare Phan MD   albuterol (PROVENTIL HFA, VENTOLIN HFA, PROAIR HFA) 90 mcg/actuation inhaler Take 1 Puff by inhalation every four (4) hours as needed for Wheezing. 11/5/20  Yes Clare Phan MD   albuterol (PROVENTIL VENTOLIN) 2.5 mg /3 mL (0.083 %) nebu 3 mL by Nebulization route every four (4) hours as needed for Wheezing. 10/16/20  Yes Clare Phan MD   lidocaine (LIDODERM) 5 % APPLY 1 PATCH TOPICALLY ONCE DAILY EVERY 12 HOURS 10/16/20  Yes Clare Phan MD   ALPRAZolam [facility]) 0.5 mg tablet Take 1 Tab by mouth three (3) times daily as needed for Anxiety. Max Daily Amount: 1.5 mg. 10/12/20  Yes Clare Phan MD   clopidogreL (PLAVIX) 75 mg tab Take 1 Tab by mouth daily. 10/12/20  Yes Clare Phan MD   triamcinolone acetonide (KENALOG) 0.1 % topical cream Apply  to affected area two (2) times a day. use thin layer 10/12/20  Yes Clare Phan MD   naloxone [facility]) 4 mg/actuation nasal spray Use 1 spray intranasally, then discard. Repeat with new spray every 2 min as needed for opioid overdose symptoms, alternating nostrils. 10/12/20  Yes Clare Phan MD   pantoprazole (PROTONIX) 40 mg tablet Take 1 Tab by mouth daily. 6/25/20  Yes Clare Phan MD   metoprolol tartrate (LOPRESSOR) 25 mg tablet Take 25 mg by mouth daily. 8/16/18  Yes Provider, Historical   oxyCODONE IR (ROXICODONE) 5 mg immediate release tablet  6/5/20  Yes Provider, Historical   fenofibrate (LOFIBRA) 160 mg tablet Take 1 Tab by mouth daily. 4/3/20  Yes Clare Phan MD   diphenhydrAMINE (BENADRYL) 25 mg capsule Take 25 mg by mouth every six (6) hours as needed. 2/17/20  Yes Provider, Historical   Nebulizer & Compressor machine 1 Each by Does Not Apply route every four (4) hours as needed for Wheezing. 2/17/20  Yes Clare Phan MD   latanoprost (XALATAN) 0.005 % ophthalmic solution Apply 1 Drop to eye daily.  10/31/19  Yes Provider, Historical   TRULICITY 1.5 MU/6.9 mL sub-q pen 1.5 mg by SubCUTAneous route every seven (7) days. 11/10/19  Yes Provider, Historical   polyethylene glycol (MIRALAX) 17 gram packet Take 1 Packet by mouth two (2) times daily as needed (constipation). 9/27/19  Yes Kyara Jarrett MD   ketoconazole 1 % sham Apply to wet hair, lather, and rinse thoroughly. Use every 3 to 4 days for up to 8 weeks 8/12/19  Yes Kyara Jarrett MD   diclofenac (VOLTAREN) 1 % gel Apply  to affected area four (4) times daily. 6/5/19  Yes Kyara Jarrett MD   insulin lispro (HUMALOG U-100 INSULIN) 100 unit/mL injection 30 Units by SubCUTAneous route Before breakfast, lunch, and dinner. Patient taking differently: 40 Units by SubCUTAneous route Before breakfast, lunch, and dinner. 2/18/19  Yes Kyara Jarrett MD   [facility] Please dispense a rolling walkerIt M54.5, G89.29, R26.9 10/18/18  Yes Kyara Jarrett MD   [facility] ULTRA TEST strip  11/5/15  Yes Provider, Historical   LANTUS 100 unit/mL injection 90 Units by SubCUTAneous route two (2) times a day. 11/29/15  Yes Provider, Historical   Insulin Syringe-Needle U-100 1 mL 31 x 5/16\" syrg  11/10/15  Yes Provider, Historical     Allergies   Allergen Reactions    Atorvastatin Other (comments)     Violent vomiting      Hydrocortisone Other (comments) and Unknown (comments)     Made wound worse when applied  Made wound worse when applied      Macrobid [Nitrofurantoin Monohyd/M-Cryst] Anaphylaxis    Methocarbamol Other (comments) and Nausea and Vomiting    Bacitracin Other (comments) and Unknown (comments)    Celexa [Citalopram] Other (comments)    Diflucan [Fluconazole] Nausea and Vomiting    Flagyl [Metronidazole] Hives    Lisinopril Other (comments)     Low BP    Metformin Other (comments)    Methylprednisolone Palpitations     Adverse reaction to methylprednisolone 500 mg.   Side effects: Severe hypertension, severe hyper glycemia  Tolerates prednisone 40 mg    Metoclopramide Other (comments) and Unknown (comments)    Neomycin-Bacitracnzn-Polymyxnb Unknown (comments)    Neosporin [Benzalkonium Chloride] Other (comments)    Pcn [Penicillins] Hives    Silver Rash     Silver tape    Sulfa (Sulfonamide Antibiotics) Hives    Sulfamethoxazole-Trimethoprim Unknown (comments)         I affirm this is a Patient-Initiated Episode with a Patient who has not had a related appointment within my department in the past 7 days or scheduled within the next 24 hours.     Total Time: minutes: 21-30 minutes    Note: not billable if this call serves to triage the patient into an appointment for the relevant concern      Nicola Farmer MD

## 2021-06-18 NOTE — PROGRESS NOTES
1. Have you been to the ER, urgent care clinic since your last visit? Hospitalized since your last visit? Yes, [facility] for pain. 05/2021    2. Have you seen or consulted any other health care providers outside of the [address] [place] [address] since your last visit? Include any pap smears or colon screening. Yes, on file. [facility]    Health Maintenance Due   Topic Date Due    Hepatitis C Screening  Never done    Pneumococcal 0-64 years (1 of 2 - PPSV23) Never done    Foot Exam Q1  Never done    Eye Exam Retinal or Dilated  Never done    DTaP/Tdap/Td series (1 - Tdap) Never done    PAP AKA CERVICAL CYTOLOGY  Never done    Shingrix Vaccine Age 50> (1 of 2) Never done    Colorectal Cancer Screening Combo  Never done    MICROALBUMIN Q1  07/10/2019    A1C test (Diabetic or Prediabetic)  06/26/2021    Lipid Screen  06/26/2021     Chief Complaint   Patient presents with    Concern For COVID-19 (Coronavirus)     Recieved 2nd Dose of vaccine on Tuesday and now pt states she has body aches and nausea, she also states she's not able to eat anything or move because she's going to be sick.

## 2021-06-22 ENCOUNTER — VIRTUAL VISIT (OUTPATIENT)
Dept: FAMILY MEDICINE CLINIC | Age: 56
End: 2021-06-22
Payer: MEDICAID

## 2021-06-22 DIAGNOSIS — R11.0 CHRONIC NAUSEA: Primary | ICD-10-CM

## 2021-06-22 DIAGNOSIS — R26.89 BALANCE PROBLEM: ICD-10-CM
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
## 2021-06-22 DIAGNOSIS — R26.9 GAIT ABNORMALITY: ICD-10-CM

## 2021-06-22 PROCEDURE — 99214 OFFICE O/P EST MOD 30 MIN: CPT | Performed by: FAMILY MEDICINE

## 2021-06-22 RX ORDER — ASPIRIN 325 MG
50000 TABLET, DELAYED RELEASE (ENTERIC COATED) ORAL
Qty: 13 CAPSULE | Refills: 3 | Status: SHIPPED | OUTPATIENT
Start: 2021-06-22 | End: 2022-07-22

## 2021-06-22 RX ORDER — ONDANSETRON 4 MG/1
TABLET, ORALLY DISINTEGRATING ORAL
COMMUNITY
Start: 2021-05-16 | End: 2021-05-20

## 2021-06-22 NOTE — PROGRESS NOTES
THIS VISIT WAS COMPLETED VIRTUALLY USING [employer]. ANDRES TOLEDO  Travis Officer Day is a 54 y.o. female who presents with a concern about nausea, balance problem, falls. She is having difficulty getting around her home. She can take 6 or 7 steps and then needs to take a break. She feels like 1 or both of her legs is going to give out if she moves any further. She has fallen when her legs give out. She is also having trouble in the shower. She cannot stand for any more than a minute without her legs giving out. As a result she needs to sit in order to bathe    Had a gastroenteritis last week that is better but now she reports her baseline nausea is back. She is wondering what next that she should take. She has never seen a gastroenterologist    PMHx:  Past Medical History:   Diagnosis Date    Diabetes ([facility]     Fibula fracture     right    Hip fracture ([facility]     Myocardial infarct ([facility]        Meds:   Current Outpatient Medications   Medication Sig Dispense Refill    cholecalciferol (VITAMIN D3) (50,000 UNITS /1250 MCG) capsule Take 1 Capsule by mouth every seven (7) days. 13 Capsule 3    OTHER Rolling walker with seat. Bariatric. 249 pounds 1 Each 0    OTHER Shower chair. Bariatric. 249 pounds 1 Each 0    aspirin 81 mg chewable tablet TAKE 4 TABLETS BY MOUTH DAILY 90 Tab 3    DULoxetine (CYMBALTA) 60 mg capsule TAKE 1 CAPSULE BY MOUTH EVERY DAY (Patient taking differently: two (2) times a day. TAKE 1 CAPSULE BY MOUTH EVERY DAY) 90 Cap 1    cyclobenzaprine (FLEXERIL) 5 mg tablet TAKE 1 TABLET BY MOUTH NIGHTLY AS NEEDED FOR MUSCLE SPASM(S). 30 Tab 3    traMADoL (ULTRAM) 50 mg tablet Take  mg by mouth as needed.  rOPINIRole (REQUIP) 0.25 mg tablet as needed.  gabapentin (NEURONTIN) 300 mg capsule TAKE 3 CAPSULES BY MOUTH THREE TIMES DAILY 270 Cap 3    ondansetron (ZOFRAN ODT) 8 mg disintegrating tablet Take 1 Tab by mouth every eight (8) hours as needed for Nausea.  30 Tab 2    fluticasone (Children's Flonase Sensimist) 27.5 mcg/actuation nasal spray USE 2 SPRAYS BY NASAL   ONCE DAILY 10 g 11    furosemide (LASIX) 40 mg tablet Take 40 mg by mouth as needed.  nitroglycerin (NITROSTAT) 0.4 mg SL tablet 1 Tab by SubLINGual route every five (5) minutes as needed for Chest Pain. Up to 3 doses. 30 Tab 1    albuterol (PROVENTIL HFA, VENTOLIN HFA, PROAIR HFA) 90 mcg/actuation inhaler Take 1 Puff by inhalation every four (4) hours as needed for Wheezing. 1 Inhaler 3    albuterol (PROVENTIL VENTOLIN) 2.5 mg /3 mL (0.083 %) nebu 3 mL by Nebulization route every four (4) hours as needed for Wheezing. 40 Each 2    lidocaine (LIDODERM) 5 % APPLY 1 PATCH TOPICALLY ONCE DAILY EVERY 12 HOURS 60 Patch 2    clopidogreL (PLAVIX) 75 mg tab Take 1 Tab by mouth daily. 90 Tab 3    naloxone (NARCAN) 4 mg/actuation nasal spray Use 1 spray intranasally, then discard. Repeat with new spray every 2 min as needed for opioid overdose symptoms, alternating nostrils. 2 Each 2    metoprolol tartrate (LOPRESSOR) 25 mg tablet Take 25 mg by mouth daily.  fenofibrate (LOFIBRA) 160 mg tablet Take 1 Tab by mouth daily. 90 Tab 3    diphenhydrAMINE (BENADRYL) 25 mg capsule Take 25 mg by mouth every six (6) hours as needed.  Nebulizer & Compressor machine 1 Each by Does Not Apply route every four (4) hours as needed for Wheezing. 1 Each 0    latanoprost (XALATAN) 0.005 % ophthalmic solution Apply 1 Drop to eye daily. 11    TRULICITY 1.5 DV/8.5 mL sub-q pen 1.5 mg by SubCUTAneous route every seven (7) days. 11    polyethylene glycol (MIRALAX) 17 gram packet Take 1 Packet by mouth two (2) times daily as needed (constipation). 72 Each 2    ketoconazole 1 % sham Apply to wet hair, lather, and rinse thoroughly. Use every 3 to 4 days for up to 8 weeks 200 mL 1    diclofenac (VOLTAREN) 1 % gel Apply  to affected area four (4) times daily.  30 g 2    insulin lispro (HUMALOG U-100 INSULIN) 100 unit/mL injection 30 Units by SubCUTAneous route Before breakfast, lunch, and dinner. (Patient taking differently: 40 Units by SubCUTAneous route Before breakfast, lunch, dinner and at bedtime.) 81 mL 3    Walker misc Please dispense a rolling walkerIt M54.5, G89.29, R26.9 1 Each 0    ONETOUCH ULTRA TEST strip       LANTUS 100 unit/mL injection 90 Units by SubCUTAneous route two (2) times a day.  Insulin Syringe-Needle U-100 1 mL 31 x 5/16\" syrg   3    cetirizine (ZYRTEC) 10 mg tablet Take 1 Tab by mouth daily. (Patient not taking: Reported on 6/22/2021) 90 Tab 3    lisinopriL (PRINIVIL, ZESTRIL) 2.5 mg tablet Take 2.5 mg by mouth daily. (Patient not taking: Reported on 6/22/2021)      ALPRAZolam (XANAX) 0.5 mg tablet Take 1 Tab by mouth three (3) times daily as needed for Anxiety. Max Daily Amount: 1.5 mg. (Patient not taking: Reported on 6/22/2021) 20 Tab 0    triamcinolone acetonide (KENALOG) 0.1 % topical cream Apply  to affected area two (2) times a day. use thin layer (Patient not taking: Reported on 6/22/2021) 15 g 0    pantoprazole (PROTONIX) 40 mg tablet Take 1 Tab by mouth daily. (Patient not taking: Reported on 6/22/2021) 90 Tab 3    oxyCODONE IR (ROXICODONE) 5 mg immediate release tablet  (Patient not taking: Reported on 6/22/2021)         Allergies: Allergies   Allergen Reactions    Atorvastatin Other (comments)     Violent vomiting      Hydrocortisone Other (comments) and Unknown (comments)     Made wound worse when applied  Made wound worse when applied      Macrobid [Nitrofurantoin Monohyd/M-Cryst] Anaphylaxis    Methocarbamol Other (comments) and Nausea and Vomiting    Bacitracin Other (comments) and Unknown (comments)    Celexa [Citalopram] Other (comments)    Diflucan [Fluconazole] Nausea and Vomiting    Flagyl [Metronidazole] Hives    Lisinopril Other (comments)     Low BP    Metformin Other (comments)    Methylprednisolone Palpitations     Adverse reaction to methylprednisolone 500 mg.   Side effects: Severe hypertension, severe hyper glycemia  Tolerates prednisone 40 mg    Metoclopramide Other (comments) and Unknown (comments)    Neomycin-Bacitracnzn-Polymyxnb Unknown (comments)    Neosporin [Benzalkonium Chloride] Other (comments)    Pcn [Penicillins] Hives    Silver Rash     Silver tape    Sulfa (Sulfonamide Antibiotics) Hives    Sulfamethoxazole-Trimethoprim Unknown (comments)       Smoker:  Social History     Tobacco Use   Smoking Status Former Smoker   Smokeless Tobacco Never Used       ETOH:   Social History     Substance and Sexual Activity   Alcohol Use No       FH:   Family History   Problem Relation Age of Onset    Cancer Mother     Diabetes Mother     Heart Disease Mother     Heart Disease Father     Diabetes Father        ROS:   As listed in HPI. In addition:  Constitutional:   No headache, fever, fatigue, weight loss or weight gain      Cardiac:    No chest pain      Resp:   No cough or shortness of breath      Neuro   No loss of consciousness, dizziness, seizures      Physical Exam:  Last menstrual period 02/19/2011. GEN: No apparent distress. Alert and oriented and responds to all questions appropriately. NEUROLOGIC:  No focal neurologic deficits. Coordination and gait grossly intact. EXT: Well perfused. No edema. SKIN: No obvious rashes. Due to this being a TeleHealth evaluation, many elements of the physical examination are unable to be assessed. Assessment and Plan     Chronic nausea  Has a history of \"tear\" at the gastroesophageal junction. Does not recall having an EGD recently. Refer for gastroenterology    Balance problem, gait abnormality, falls    Rolling walker:  She would benefit from a rolling walker with seat to assist with ADLs around the home. She cannot walk more than 6 or 7 steps without needing to sit down and take a break. She has tried a cane and fallen. [address] [place] is not sufficient. She cannot walk without an assistive device.       She cannot  the shower. She cannot keep her balance and has fallen. She requires a shower chair to allow her to bathe safely. This needs to be a bariatric shower chair because of her weight        ICD-10-CM ICD-9-CM    1. Chronic nausea  R11.0 787.02    2. Balance problem  R26.89 781.99 OTHER      OTHER   3. Gait abnormality  R26.9 781.2 OTHER      OTHER   4. Fall, sequela  W19. XXXS 909.4 OTHER     E929.3 OTHER         Pursuant to the emergency declaration under the [facility] waiver authority and the [employer] Act, this Virtual  Visit was conducted, with patient's consent, to reduce the patient's risk of exposure to COVID-19 and provide continuity of care for an established patient. Services were provided through a video synchronous discussion virtually to substitute for in-person clinic visit.

## 2021-06-24 ENCOUNTER — TELEPHONE (OUTPATIENT)
Dept: FAMILY MEDICINE CLINIC | Age: 56
End: 2021-06-24

## 2021-06-24 DIAGNOSIS — R26.9 GAIT ABNORMALITY: ICD-10-CM
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
## 2021-06-24 DIAGNOSIS — R26.89 BALANCE PROBLEM: ICD-10-CM

## 2021-06-24 NOTE — TELEPHONE ENCOUNTER
Patient called and said they do not deliver to her for the walker and shower chair. Patient stated she found somewhere that would deliver to her and is asking if it can be switched to that place, if it is to much trouble patient said she would figure something out.     [employer] [facility] (they will deliver)  Fax number: [phone]  Phone number: [phone]    Please give patient a call back with any questions  BCB# [phone]

## 2021-06-25 NOTE — ADDENDUM NOTE
"Chief Complaint   Patient presents with     Well Child     /79 (BP Location: Right arm, Patient Position: Chair, Cuff Size: Adult Regular)  Pulse 88  Temp 98.3  F (36.8  C) (Oral)  Ht 5' 3.5\" (1.613 m)  Wt 159 lb (72.1 kg)  LMP 07/12/2017 (Approximate)  SpO2 97%  Breastfeeding? No  BMI 27.72 kg/m2 Estimated body mass index is 27.72 kg/(m^2) as calculated from the following:    Height as of this encounter: 5' 3.5\" (1.613 m).    Weight as of this encounter: 159 lb (72.1 kg).  Medication Reconciliation: complete   Tia Santo MA    " Addended by: Alice Genao on: 6/25/2021 12:31 PM     Modules accepted: Orders

## 2021-06-25 NOTE — TELEPHONE ENCOUNTER
Notified by [facility] and the [facility] that she needs a revised prescription for standard Rollator and standard shower chair because she needs to weigh greater than 300 pounds to qualify for bariatric supplies.     Printed revised prescriptions

## 2021-06-28 ENCOUNTER — TELEPHONE (OUTPATIENT)
Dept: FAMILY MEDICINE CLINIC | Age: 56
End: 2021-06-28

## 2021-06-28 NOTE — TELEPHONE ENCOUNTER
[facility] most recent office notes pertaining to gastro diagnosis, labs and imaging that were done to patient.      Phone [phone] option 2  Fax [phone]

## 2021-09-24 DIAGNOSIS — M79.2 NEUROPATHIC PAIN: ICD-10-CM

## 2021-09-24 RX ORDER — GABAPENTIN 300 MG/1
CAPSULE ORAL
Qty: 270 CAPSULE | Refills: 0 | Status: SHIPPED | OUTPATIENT
Start: 2021-09-24 | End: 2021-11-12

## 2021-11-12 DIAGNOSIS — M79.2 NEUROPATHIC PAIN: ICD-10-CM

## 2021-11-12 RX ORDER — GABAPENTIN 300 MG/1
CAPSULE ORAL
Qty: 270 CAPSULE | Refills: 0 | Status: SHIPPED | OUTPATIENT
Start: 2021-11-12 | End: 2021-12-29

## 2021-12-01 ENCOUNTER — OFFICE VISIT (OUTPATIENT)
Dept: FAMILY MEDICINE CLINIC | Age: 56
End: 2021-12-01
Payer: MEDICAID

## 2021-12-01 VITALS
TEMPERATURE: 98.6 F | HEIGHT: 65 IN | WEIGHT: 260 LBS | DIASTOLIC BLOOD PRESSURE: 77 MMHG | SYSTOLIC BLOOD PRESSURE: 138 MMHG | OXYGEN SATURATION: 96 % | HEART RATE: 76 BPM | BODY MASS INDEX: 43.32 KG/M2 | RESPIRATION RATE: 16 BRPM

## 2021-12-01 DIAGNOSIS — I10 ESSENTIAL HYPERTENSION: ICD-10-CM

## 2021-12-01 DIAGNOSIS — R35.0 URINARY FREQUENCY: Primary | ICD-10-CM

## 2021-12-01 DIAGNOSIS — J06.9 URI WITH COUGH AND CONGESTION: ICD-10-CM

## 2021-12-01 DIAGNOSIS — Z79.4 TYPE 2 DIABETES MELLITUS WITH DIABETIC NEUROPATHY, WITH LONG-TERM CURRENT USE OF INSULIN (HCC): ICD-10-CM

## 2021-12-01 DIAGNOSIS — K31.84 GASTROPARESIS: ICD-10-CM

## 2021-12-01 DIAGNOSIS — E11.40 TYPE 2 DIABETES MELLITUS WITH DIABETIC NEUROPATHY, WITH LONG-TERM CURRENT USE OF INSULIN (HCC): ICD-10-CM

## 2021-12-01 DIAGNOSIS — F17.218 CIGARETTE NICOTINE DEPENDENCE WITH OTHER NICOTINE-INDUCED DISORDER: ICD-10-CM

## 2021-12-01 DIAGNOSIS — J45.20 MILD INTERMITTENT ASTHMA WITHOUT COMPLICATION: Primary | ICD-10-CM

## 2021-12-01 DIAGNOSIS — R11.0 CHRONIC NAUSEA: ICD-10-CM

## 2021-12-01 DIAGNOSIS — K59.09 CHRONIC CONSTIPATION: ICD-10-CM

## 2021-12-01 LAB
BILIRUB UR QL STRIP: NEGATIVE
GLUCOSE UR-MCNC: NORMAL MG/DL
KETONES P FAST UR STRIP-MCNC: NEGATIVE MG/DL
PH UR STRIP: 5.5 [PH] (ref 4.6–8)
PROT UR QL STRIP: NORMAL
SP GR UR STRIP: 1.02 (ref 1–1.03)
UA UROBILINOGEN AMB POC: NORMAL (ref 0.2–1)
URINALYSIS CLARITY POC: CLEAR
URINALYSIS COLOR POC: YELLOW
URINE BLOOD POC: NORMAL
URINE LEUKOCYTES POC: NEGATIVE
URINE NITRITES POC: NEGATIVE

## 2021-12-01 PROCEDURE — 81003 URINALYSIS AUTO W/O SCOPE: CPT | Performed by: FAMILY MEDICINE

## 2021-12-01 PROCEDURE — 99214 OFFICE O/P EST MOD 30 MIN: CPT | Performed by: FAMILY MEDICINE

## 2021-12-01 RX ORDER — IBUPROFEN 200 MG
1 TABLET ORAL EVERY 24 HOURS
Qty: 30 PATCH | Refills: 0 | Status: SHIPPED | OUTPATIENT
Start: 2021-12-01 | End: 2021-12-22

## 2021-12-01 RX ORDER — LINACLOTIDE 290 UG/1
CAPSULE, GELATIN COATED ORAL
COMMUNITY
Start: 2021-11-03

## 2021-12-01 RX ORDER — FLUTICASONE FUROATE 27.5 MCG
SPRAY, SUSPENSION (ML) NASAL
Qty: 10 G | Refills: 11 | Status: SHIPPED | OUTPATIENT
Start: 2021-12-01

## 2021-12-01 RX ORDER — DULOXETIN HYDROCHLORIDE 60 MG/1
60 CAPSULE, DELAYED RELEASE ORAL 2 TIMES DAILY
Qty: 180 CAPSULE | Refills: 3 | Status: SHIPPED | OUTPATIENT
Start: 2021-12-01

## 2021-12-01 RX ORDER — TRAMADOL HYDROCHLORIDE 50 MG/1
50 TABLET ORAL
COMMUNITY

## 2021-12-01 RX ORDER — OMEPRAZOLE 20 MG/1
CAPSULE, DELAYED RELEASE ORAL
COMMUNITY
Start: 2021-09-28

## 2021-12-01 RX ORDER — DIPHENHYDRAMINE HCL 25 MG
4 CAPSULE ORAL AS NEEDED
Qty: 100 EACH | Refills: 1 | Status: SHIPPED | OUTPATIENT
Start: 2021-12-01 | End: 2022-03-07

## 2021-12-01 NOTE — PROGRESS NOTES
1. Have you been to the ER, urgent care clinic since your last visit? Hospitalized since your last visit? Yes, [facility] 2 weeks ago for UTI. 2. Have you seen or consulted any other health care providers outside of the [address] [place] [address] since your last visit? Include any pap smears or colon screening. No    Health Maintenance Due   Topic Date Due    Hepatitis C Screening  Never done    Cervical cancer screen  Never done    Shingrix Vaccine Age 50> (1 of 2) Never done    MICROALBUMIN Q1  07/10/2019    Lipid Screen  06/26/2021    A1C test (Diabetic or Prediabetic)  08/19/2021    Flu Vaccine (1) 09/01/2021     Chief Complaint   Patient presents with    Abdominal Pain    Urinary Frequency     Pt states she has urges to go to the bathroom but by the time she gets to the restroom, she doesn't seem to have to go anywhere     Pt also wants to quit smoking and discuss this with provider.

## 2021-12-01 NOTE — PROGRESS NOTES
PARIS Stovall is a 64 y.o. female who Presents to follow-up on recent specialist visits. She is some abdominal troubles and is seeing gastroenterology. She reports \"I got checked out and I don't have gastroparesis\". Based on the information I have she had a nuclear medicine gastric emptying study in August which showed a delayed gastric emptying time of 101 min (normal is 40 to 90 min) which radiologist said could be indicative of gastroparesis. No evidence of reflux and no evidence of a hiatal hernia. She has been having irregular bowel movements and is having trouble giving me history for a pattern. Reports taking Linzess and \"since then I have one bowel movement every day and it is complete\" and then goes on to say that she is not sure the Linzess is solving anything. She has had a periodic bladder problem but she describes a sensation of needing to go to the bathroom and then getting to the bathroom and unable to go. She will rock back-and-forth and eventually produce some urine. There was a blood tinge in her underwear a few times over the last month and she is not sure if this was a vaginal or bladder source. She is thought this was a urinary tract infection and this brought her to the emergency room 11/20. Urinalysis suggested inflammatory urine she was placed on antibiotics. She returned with nausea vomiting diarrhea 2 days later. Her urine was no longer inflammatory and this confused her. If a culture was done I do not have access to the results    She is really worried about recently being told she might have \"chronic kidney disease stage III\". I reviewed recent blood work with her. Her GFR has been fluctuating between the stage II and III range for a while. Little hard to interpret because a lot of these blood samples are obtained during acute illness in the emergency room.     She is fixated on \"a mass in my spine\" referring I think to a radiology read from April 2021 which showed \"moderate sized focal central disc protrusion T10/11 narrowing the canal causing some minor mass-effect upon the cord\"            PMHx:  Past Medical History:   Diagnosis Date    Diabetes ([facility]     Fibula fracture     right    Hip fracture (HCC)     Myocardial infarct ([facility] [address]        Meds:   Current Outpatient Medications   Medication Sig Dispense Refill    traMADoL (ULTRAM) 50 mg tablet Take 50 mg by mouth every six (6) hours as needed for Pain.  Linzess 290 mcg cap capsule       DULoxetine (CYMBALTA) 60 mg capsule Take 1 Capsule by mouth two (2) times a day. 180 Capsule 3    fluticasone (Children's Flonase Sensimist) 27.5 mcg/actuation nasal spray USE 2 SPRAYS BY NASAL   ONCE DAILY 10 g 11    nicotine (Nicorette) 4 mg gum Take 1 Each by mouth as needed for Smoking Cessation for up to 30 days. 100 Each 1    nicotine (NICODERM CQ) 21 mg/24 hr 1 Patch by TransDERmal route every twenty-four (24) hours for 30 days. 30 Patch 0    gabapentin (NEURONTIN) 300 mg capsule TAKE 3 CAPSULES BY MOUTH THREE TIMES DAILY 270 Capsule 0    lidocaine (LIDODERM) 5 % APPLY 1 PATCH TOPICALLY ONCE DAILY EVERY 12 HOURS 90 Patch 1    OTHER Rolling walker with seat. Standard. 249 pounds 1 Each 0    OTHER Shower chair. Standard. 249 pounds 1 Each 0    cholecalciferol (VITAMIN D3) (50,000 UNITS /1250 MCG) capsule Take 1 Capsule by mouth every seven (7) days. 13 Capsule 3    aspirin 81 mg chewable tablet TAKE 4 TABLETS BY MOUTH DAILY 90 Tab 3    cyclobenzaprine (FLEXERIL) 5 mg tablet TAKE 1 TABLET BY MOUTH NIGHTLY AS NEEDED FOR MUSCLE SPASM(S). 30 Tab 3    ondansetron (ZOFRAN ODT) 8 mg disintegrating tablet Take 1 Tab by mouth every eight (8) hours as needed for Nausea. 30 Tab 2    furosemide (LASIX) 40 mg tablet Take 40 mg by mouth as needed.  nitroglycerin (NITROSTAT) 0.4 mg SL tablet 1 Tab by SubLINGual route every five (5) minutes as needed for Chest Pain. Up to 3 doses.  30 Tab 1    metoprolol tartrate (LOPRESSOR) 25 mg tablet Take 25 mg by mouth daily.  fenofibrate (LOFIBRA) 160 mg tablet Take 1 Tab by mouth daily. 90 Tab 3    diphenhydrAMINE (BENADRYL) 25 mg capsule Take 25 mg by mouth every six (6) hours as needed.  Nebulizer & Compressor machine 1 Each by Does Not Apply route every four (4) hours as needed for Wheezing. 1 Each 0    latanoprost (XALATAN) 0.005 % ophthalmic solution Apply 1 Drop to eye daily. 11    insulin lispro (HUMALOG U-100 INSULIN) 100 unit/mL injection 30 Units by SubCUTAneous route Before breakfast, lunch, and dinner. (Patient taking differently: 40 Units by SubCUTAneous route Before breakfast, lunch, dinner and at bedtime.) 81 mL 3    Walker misc Please dispense a rolling walkerIt M54.5, G89.29, R26.9 1 Each 0    ONETOUCH ULTRA TEST strip       LANTUS 100 unit/mL injection 90 Units by SubCUTAneous route two (2) times a day.  Insulin Syringe-Needle U-100 1 mL 31 x 5/16\" syrg   3    omeprazole (PRILOSEC) 20 mg capsule          Allergies: Allergies   Allergen Reactions    Atorvastatin Other (comments)     Violent vomiting      Hydrocortisone Other (comments) and Unknown (comments)     Made wound worse when applied  Made wound worse when applied      Macrobid [Nitrofurantoin Monohyd/M-Cryst] Anaphylaxis    Methocarbamol Other (comments) and Nausea and Vomiting    Bacitracin Other (comments) and Unknown (comments)    Celexa [Citalopram] Other (comments)    Diflucan [Fluconazole] Nausea and Vomiting    Flagyl [Metronidazole] Hives    Lisinopril Other (comments)     Low BP    Metformin Other (comments)    Methylprednisolone Palpitations     Adverse reaction to methylprednisolone 500 mg.   Side effects: Severe hypertension, severe hyper glycemia  Tolerates prednisone 40 mg    Metoclopramide Other (comments) and Unknown (comments)    Neomycin-Bacitracnzn-Polymyxnb Unknown (comments)    Neosporin [Benzalkonium Chloride] Other (comments)    Pcn [Penicillins] Hives    Silver Rash     Silver tape    Sulfa (Sulfonamide Antibiotics) Hives    Sulfamethoxazole-Trimethoprim Unknown (comments)       Smoker:  Social History     Tobacco Use   Smoking Status Former Smoker   Smokeless Tobacco Never Used       ETOH:   Social History     Substance and Sexual Activity   Alcohol Use No       FH:   Family History   Problem Relation Age of Onset    Cancer Mother     Diabetes Mother     Heart Disease Mother     Heart Disease Father     Diabetes Father        ROS:   As listed in HPI. In addition:  Constitutional:   No headache, fever, fatigue, weight loss or weight gain      Cardiac:    No chest pain      Resp:   No cough or shortness of breath      Neuro   No loss of consciousness, dizziness, seizures      Physical Exam:  Blood pressure 138/77, pulse 76, temperature 98.6 °F (37 °C), temperature source Oral, resp. rate 16, height 5' 5\" (1.651 m), weight 260 lb (117.9 kg), last menstrual period 02/19/2011, SpO2 96 %. GEN: No apparent distress. Alert and oriented and responds to all questions appropriately. NEUROLOGIC:  No focal neurologic deficits. Strength and sensation grossly intact. Coordination and gait grossly intact. EXT: Well perfused. No edema. SKIN: No obvious rashes. Lungs clear to auscultation bilaterally  CV regular rhythm no murmur       Assessment and Plan     Urinary frequency, hesitancy  No dysuria  Consider constipation, medication side effect, bladder outlet obstruction i.e. prolapse  Noninflammatory urine today  Appropriately, has an appointment to see gynecology in a week  This is verbatim her complaint from a few years ago. I referred her to urogynecology and she had one appointment with a plan to follow-up with physical therapy and get a cystoscopy. Then the pandemic happened. If her gynecologist cannot help her out with this will refer back to urogynecology.     Elevated creatinine  Big picture looks like CKD 2  Explained our classification kidney function and tried to place this in context  To protect kidneys we maintain good control of blood sugar, good blood pressure and avoid NSAIDs  Emphasized the importance of good blood sugar control. Back pain  Explained there is no \"mass\". She has disc herniation. Early satiety  Known delayed gastric emptying  Feels like she was told this was not diabetic gastroparesis  Continue following up with gastroenterology for ongoing work-up. Constipation  It sounds like Linzess is working based on her description but she does not seem convinced. Smoker  She would like to quit. We discussed that adding Wellbutrin and/or Chantix is not a great idea with her polypharmacy  She would like to try patch and gum. Will prescribe    Anxiety  She feels like her anxiety is not well controlled. She does seem a little more worried than usual and I had some difficulty today providing reassurance about the health concerns that she has become anxious about. She has taken Cymbalta 120 mg in the past but for the last year has been taking 60 mg we'll try back at the higher 120 mg dose and see if that helps    This was a 40-minute visit. Greater than 50% of the time was spent counseling the patient on urinary frequency, constipation, nicotine addiction. ICD-10-CM ICD-9-CM    1. Urinary frequency  R35.0 788.41 AMB POC URINALYSIS DIP STICK AUTO W/O MICRO   2. URI with cough and congestion  J06.9 465.9 fluticasone (Children's Flonase Sensimist) 27.5 mcg/actuation nasal spray   3. Chronic nausea  R11.0 787.02    4. Type 2 diabetes mellitus with diabetic neuropathy, with long-term current use of insulin ([facility])  E11.40 250.60     Z79.4 357.2      V58.67    5. Essential hypertension  I10 401.9    6. Gastroparesis  K31.84 536.3    7. Chronic constipation  K59.09 564.00    8. Cigarette nicotine dependence with other nicotine-induced disorder  F17.218 292.89 nicotine (Nicorette) 4 mg gum      nicotine (NICODERM CQ) 21 mg/24 hr       AVS given.  Pt expressed understanding of instructions

## 2021-12-02 RX ORDER — ALBUTEROL SULFATE 0.83 MG/ML
2.5 SOLUTION RESPIRATORY (INHALATION)
Qty: 100 EACH | Refills: 2 | Status: SHIPPED | OUTPATIENT
Start: 2021-12-02

## 2021-12-02 RX ORDER — DULOXETIN HYDROCHLORIDE 60 MG/1
CAPSULE, DELAYED RELEASE ORAL
Qty: 90 CAPSULE | Refills: 1 | OUTPATIENT
Start: 2021-12-02

## 2021-12-02 RX ORDER — ALBUTEROL SULFATE 90 UG/1
1 AEROSOL, METERED RESPIRATORY (INHALATION)
Qty: 18 G | Refills: 2 | Status: SHIPPED | OUTPATIENT
Start: 2021-12-02 | End: 2022-03-07

## 2021-12-22 DIAGNOSIS — M79.2 NEUROPATHIC PAIN: ICD-10-CM

## 2021-12-22 DIAGNOSIS — F17.218 CIGARETTE NICOTINE DEPENDENCE WITH OTHER NICOTINE-INDUCED DISORDER: ICD-10-CM

## 2021-12-22 RX ORDER — CLOPIDOGREL BISULFATE 75 MG/1
TABLET ORAL
Qty: 90 TABLET | Refills: 3 | OUTPATIENT
Start: 2021-12-22

## 2021-12-22 RX ORDER — LIDOCAINE 50 MG/G
PATCH TOPICAL
Qty: 90 PATCH | Refills: 1 | Status: SHIPPED | OUTPATIENT
Start: 2021-12-22 | End: 2022-09-30

## 2021-12-22 RX ORDER — GUAIFENESIN 100 MG/5ML
LIQUID (ML) ORAL
Qty: 90 TABLET | Refills: 3 | Status: SHIPPED | OUTPATIENT
Start: 2021-12-22 | End: 2022-06-08

## 2021-12-22 RX ORDER — IBUPROFEN 200 MG
1 TABLET ORAL EVERY 24 HOURS
Qty: 28 PATCH | Refills: 3 | Status: SHIPPED | OUTPATIENT
Start: 2021-12-22

## 2021-12-29 DIAGNOSIS — M79.2 NEUROPATHIC PAIN: ICD-10-CM

## 2021-12-29 RX ORDER — GABAPENTIN 300 MG/1
CAPSULE ORAL
Qty: 270 CAPSULE | Refills: 0 | Status: SHIPPED | OUTPATIENT
Start: 2021-12-29 | End: 2022-02-21

## 2022-01-27 ENCOUNTER — VIRTUAL VISIT (OUTPATIENT)
Dept: FAMILY MEDICINE CLINIC | Age: 57
End: 2022-01-27
Payer: MEDICAID

## 2022-01-27 DIAGNOSIS — J02.9 SORE THROAT: Primary | ICD-10-CM

## 2022-01-27 DIAGNOSIS — J06.9 URI WITH COUGH AND CONGESTION: ICD-10-CM

## 2022-01-27 DIAGNOSIS — R52 BODY ACHES: ICD-10-CM

## 2022-01-27 LAB
FLUAV+FLUBV AG NOSE QL IA.RAPID: NEGATIVE
FLUAV+FLUBV AG NOSE QL IA.RAPID: NEGATIVE
VALID INTERNAL CONTROL?: YES

## 2022-01-27 PROCEDURE — 99214 OFFICE O/P EST MOD 30 MIN: CPT | Performed by: FAMILY MEDICINE

## 2022-01-27 RX ORDER — ONDANSETRON 8 MG/1
8 TABLET, ORALLY DISINTEGRATING ORAL
Qty: 21 TABLET | Refills: 1 | Status: SHIPPED | OUTPATIENT
Start: 2022-01-27

## 2022-01-27 RX ORDER — BENZONATATE 200 MG/1
200 CAPSULE ORAL
Qty: 21 CAPSULE | Refills: 1 | Status: SHIPPED | OUTPATIENT
Start: 2022-01-27 | End: 2022-02-03

## 2022-01-27 NOTE — PROGRESS NOTES
1. Have you been to the ER, urgent care clinic since your last visit? Hospitalized since your last visit? No    2. Have you seen or consulted any other health care providers outside of the [address] [place] [address] since your last visit? Include any pap smears or colon screening.  No    Health Maintenance Due   Topic Date Due    Hepatitis C Screening  Never done    Cervical cancer screen  Never done    Shingrix Vaccine Age 50> (1 of 2) Never done    MICROALBUMIN Q1  07/10/2019    Lipid Screen  06/26/2021    A1C test (Diabetic or Prediabetic)  08/19/2021    Flu Vaccine (1) 09/01/2021    Breast Cancer Screen Mammogram  01/27/2022     Chief Complaint   Patient presents with    Cold Symptoms     symptoms started Saturday 1/22/22    Sore Throat    Nasal Congestion    Fatigue    Dizziness

## 2022-01-27 NOTE — PROGRESS NOTES
THIS VISIT WAS COMPLETED VIRTUALLY USING DOXY. [facility]  Sabina Stovall is a 64 y.o. female who presents with URI cough congestion sore throat body aches headache that started 1/22. She is fully vaccinated and boosted against Covid. Had her booster in December. Had her flu shot in November. Has a significant cardiac history. Is not currently short of breath. Has a slight cough. Has not tried anything for symptoms    PMHx:  Past Medical History:   Diagnosis Date    Diabetes ([facility] [address]     Fibula fracture     right    Hip fracture (HCC)     Myocardial infarct ([facility] [address]        Meds:   Current Outpatient Medications   Medication Sig Dispense Refill    benzonatate (TESSALON) 200 mg capsule Take 1 Capsule by mouth three (3) times daily as needed for Cough for up to 7 days. 21 Capsule 1    ondansetron (ZOFRAN ODT) 8 mg disintegrating tablet Take 1 Tablet by mouth every eight (8) hours as needed for Nausea or Vomiting. 21 Tablet 1    gabapentin (NEURONTIN) 300 mg capsule TAKE 3 CAPSULES BY MOUTH THREE TIMES DAILY 270 Capsule 0    aspirin 81 mg chewable tablet TAKE 4 TABLETS BY MOUTH EVERY DAY 90 Tablet 3    lidocaine (LIDODERM) 5 % APPLY 1 PATCH TOPICALLY ONCE DAILY EVERY 12 HOURS 90 Patch 1    nicotine (NICODERM CQ) 21 mg/24 hr 1 Patch by TransDERmal route every twenty-four (24) hours. 28 Patch 3    albuterol (PROVENTIL VENTOLIN) 2.5 mg /3 mL (0.083 %) nebu 3 mL by Nebulization route every four (4) hours as needed for Wheezing. 100 Each 2    albuterol (PROVENTIL HFA, VENTOLIN HFA, PROAIR HFA) 90 mcg/actuation inhaler Take 1 Puff by inhalation every four (4) hours as needed for Wheezing. 18 g 2    traMADoL (ULTRAM) 50 mg tablet Take 50 mg by mouth every six (6) hours as needed for Pain.  Linzess 290 mcg cap capsule       omeprazole (PRILOSEC) 20 mg capsule       DULoxetine (CYMBALTA) 60 mg capsule Take 1 Capsule by mouth two (2) times a day.  180 Capsule 3    fluticasone (Children's Flonase Sensimist) 27.5 mcg/actuation nasal spray USE 2 SPRAYS BY NASAL   ONCE DAILY 10 g 11    OTHER Rolling walker with seat. Standard. 249 pounds 1 Each 0    OTHER Shower chair. Standard. 249 pounds 1 Each 0    cholecalciferol (VITAMIN D3) (50,000 UNITS /1250 MCG) capsule Take 1 Capsule by mouth every seven (7) days. 13 Capsule 3    cyclobenzaprine (FLEXERIL) 5 mg tablet TAKE 1 TABLET BY MOUTH NIGHTLY AS NEEDED FOR MUSCLE SPASM(S). 30 Tab 3    ondansetron (ZOFRAN ODT) 8 mg disintegrating tablet Take 1 Tab by mouth every eight (8) hours as needed for Nausea. 30 Tab 2    furosemide (LASIX) 40 mg tablet Take 40 mg by mouth as needed.  nitroglycerin (NITROSTAT) 0.4 mg SL tablet 1 Tab by SubLINGual route every five (5) minutes as needed for Chest Pain. Up to 3 doses. 30 Tab 1    metoprolol tartrate (LOPRESSOR) 25 mg tablet Take 25 mg by mouth daily.  fenofibrate (LOFIBRA) 160 mg tablet Take 1 Tab by mouth daily. 90 Tab 3    latanoprost (XALATAN) 0.005 % ophthalmic solution Apply 1 Drop to eye daily. 11    insulin lispro (HUMALOG U-100 INSULIN) 100 unit/mL injection 30 Units by SubCUTAneous route Before breakfast, lunch, and dinner. (Patient taking differently: 40 Units by SubCUTAneous route Before breakfast, lunch, dinner and at bedtime.) 81 mL 3    Walker misc Please dispense a rolling walkerIt M54.5, G89.29, R26.9 1 Each 0    ONETOUCH ULTRA TEST strip       LANTUS 100 unit/mL injection 90 Units by SubCUTAneous route two (2) times a day.  Insulin Syringe-Needle U-100 1 mL 31 x 5/16\" syrg   3    diphenhydrAMINE (BENADRYL) 25 mg capsule Take 25 mg by mouth every six (6) hours as needed. (Patient not taking: Reported on 1/27/2022)      Nebulizer & Compressor machine 1 Each by Does Not Apply route every four (4) hours as needed for Wheezing. 1 Each 0       Allergies:    Allergies   Allergen Reactions    Atorvastatin Other (comments)     Violent vomiting      Hydrocortisone Other (comments) and Unknown (comments)     Made wound worse when applied  Made wound worse when applied      Macrobid [Nitrofurantoin Monohyd/M-Cryst] Anaphylaxis    Methocarbamol Other (comments) and Nausea and Vomiting    Bacitracin Other (comments) and Unknown (comments)    Celexa [Citalopram] Other (comments)    Diflucan [Fluconazole] Nausea and Vomiting    Flagyl [Metronidazole] Hives    Lisinopril Other (comments)     Low BP    Metformin Other (comments)    Methylprednisolone Palpitations     Adverse reaction to methylprednisolone 500 mg. Side effects: Severe hypertension, severe hyper glycemia  Tolerates prednisone 40 mg    Metoclopramide Other (comments) and Unknown (comments)    Neomycin-Bacitracnzn-Polymyxnb Unknown (comments)    Neosporin [Benzalkonium Chloride] Other (comments)    Pcn [Penicillins] Hives    Silver Rash     Silver tape    Sulfa (Sulfonamide Antibiotics) Hives    Sulfamethoxazole-Trimethoprim Unknown (comments)       Smoker:  Social History     Tobacco Use   Smoking Status Former Smoker   Smokeless Tobacco Never Used       ETOH:   Social History     Substance and Sexual Activity   Alcohol Use No       FH:   Family History   Problem Relation Age of Onset    Cancer Mother     Diabetes Mother     Heart Disease Mother     Heart Disease Father     Diabetes Father        ROS:   As listed in HPI. In addition:  Constitutional:   No headache, fever, fatigue, weight loss or weight gain      Cardiac:    No chest pain      Resp:   No cough or shortness of breath      Neuro   No loss of consciousness, dizziness, seizures      Physical Exam:  Last menstrual period 02/19/2011. GEN: No apparent distress. Alert and oriented and responds to all questions appropriately. NEUROLOGIC:  No focal neurologic deficits. Coordination and gait grossly intact. EXT: Well perfused. No edema. SKIN: No obvious rashes.       Due to this being a TeleHealth evaluation, many elements of the physical examination are unable to be assessed. Assessment and Plan     URI cough congestion body aches sore throat  Flu test and Covid test  Honey and warm water  Tessalon  Zofran  Not currently short of breath    She has a significant cardiac history and would be a good candidate for infusion if Covid positive. Encouraged her to monitor her symptoms and go to the emergency room if she feels like she cannot breathe. ICD-10-CM ICD-9-CM    1. Sore throat  J02.9 462 NOVEL CORONAVIRUS (COVID-19)      AMB POC JENARO INFLUENZA A/B TEST   2. Body aches  R52 780.96 NOVEL CORONAVIRUS (COVID-19)      AMB POC JENARO INFLUENZA A/B TEST   3. URI with cough and congestion  J06.9 465.9 NOVEL CORONAVIRUS (COVID-19)      AMB POC JENARO INFLUENZA A/B TEST         Pursuant to the emergency declaration under the [address] [place] [address], [facility] waiver authority and the [employer] Act, this Virtual  Visit was conducted, with patient's consent, to reduce the patient's risk of exposure to COVID-19 and provide continuity of care for an established patient. Services were provided through a video synchronous discussion virtually to substitute for in-person clinic visit.

## 2022-01-28 ENCOUNTER — TELEPHONE (OUTPATIENT)
Dept: FAMILY MEDICINE CLINIC | Age: 57
End: 2022-01-28

## 2022-01-28 LAB
SARS-COV-2, NAA 2 DAY TAT: NORMAL
SARS-COV-2, NAA: NOT DETECTED

## 2022-01-28 RX ORDER — PROCHLORPERAZINE MALEATE 10 MG
5 TABLET ORAL
Qty: 30 TABLET | Refills: 0 | Status: SHIPPED | OUTPATIENT
Start: 2022-01-28 | End: 2022-02-04

## 2022-01-31 ENCOUNTER — TELEPHONE (OUTPATIENT)
Dept: FAMILY MEDICINE CLINIC | Age: 57
End: 2022-01-31

## 2022-01-31 NOTE — TELEPHONE ENCOUNTER
Per Pt stated that Amy Hadley told her that her COVID results would be back today (01/31/2022); Pt also stated that someone would be in touch with her about her COVID results and she has not heard from anyone yet; I advised the pt that sometimes the test do take a little longer but will let the clinical staff know that she was calling about her results; pt verbally agreed;  Pt also stated that if her results came back positive that she was to get an infusion done per Dr. Edgardo Erickson; Please call pt back as Pt is very concerned       Thank You

## 2022-01-31 NOTE — TELEPHONE ENCOUNTER
Pt notified and that her covid results are not back yet.  Pt is requesting a call back from Dr. Holley Rang

## 2022-01-31 NOTE — TELEPHONE ENCOUNTER
Pt is calling to see if he can give something  else states what she has isn't working states nausea med isn't working wants something else also the tylenol isn't working either

## 2022-01-31 NOTE — TELEPHONE ENCOUNTER
Returned her call. Covid test is negative. She is feeling fatigued, body ache, dizzy. Feels like she is keeping up with her fluids. Sounds like she is getting 3 L in. Does have a sense of nausea but I think she is referring to a lack of appetite. Explained to the nausea medicine will help her keep up with her fluids but it is not necessary for her to eat. Her blood sugar is on the low 100s and she has had to cut back on her insulin because of that it decreased oral intake. Denies hypoglycemia. She does not think that she has hypotension but cannot confirm that because her blood pressure cuff needs new batteries. Continue Tylenol, nausea medicine, fluids. She will need to tolerate the fatigue and overall viral syndrome until feeling better.   Remain vigilant for possible drug side effects with her polypharmacy

## 2022-02-03 ENCOUNTER — TELEPHONE (OUTPATIENT)
Dept: FAMILY MEDICINE CLINIC | Age: 57
End: 2022-02-03

## 2022-02-03 NOTE — TELEPHONE ENCOUNTER
Called pt in regards to covid testing. Pt states she had received a call from the Health Department yesterday stating they were calling to f/u with her on positive covid results. Pt then tells me she had only been tested here at [facility] and she received negative results. I notified pt that I saw negative results in her chart as well. Pt then states she is still feeling bad overall, pt states symptoms haven't been persistent, they started again Tuesday night. She states she has been very fatigue, experiencing a lot of weakness and having headaches also. Pt wanting to know if she could receive a call between today or tomorrow from provider.

## 2022-02-03 NOTE — TELEPHONE ENCOUNTER
Patient states she spoke with Dr. Tanisha Palencia 1st part of week and was told she did not test positive for COVID or the flu. She then received a text and a vm from the Health Dept stating they needed to speak to her regarding her positive COVID results. She is now confused and wants to speak with Dr. Tanisha Palencia to find out what's going on. She states the only test she took was through this office but she has been feeling bad and she slept all day yesterday because she feeling bad and today when she checked her messages that is what she received. Please call.

## 2022-02-19 DIAGNOSIS — M79.2 NEUROPATHIC PAIN: ICD-10-CM

## 2022-02-21 RX ORDER — GABAPENTIN 300 MG/1
CAPSULE ORAL
Qty: 270 CAPSULE | Refills: 0 | Status: SHIPPED | OUTPATIENT
Start: 2022-02-21 | End: 2022-07-25 | Stop reason: SDUPTHER

## 2022-02-28 LAB — MAMMOGRAPHY, EXTERNAL: NORMAL

## 2022-03-04 ENCOUNTER — VIRTUAL VISIT (OUTPATIENT)
Dept: FAMILY MEDICINE CLINIC | Age: 57
End: 2022-03-04
Payer: MEDICAID

## 2022-03-04 DIAGNOSIS — I10 ESSENTIAL HYPERTENSION: Primary | ICD-10-CM

## 2022-03-04 DIAGNOSIS — R52 BODY ACHES: ICD-10-CM

## 2022-03-04 DIAGNOSIS — M79.2 NEUROPATHIC PAIN: ICD-10-CM

## 2022-03-04 DIAGNOSIS — J30.1 ALLERGIC RHINITIS DUE TO POLLEN, UNSPECIFIED SEASONALITY: ICD-10-CM

## 2022-03-04 PROCEDURE — 99214 OFFICE O/P EST MOD 30 MIN: CPT | Performed by: FAMILY MEDICINE

## 2022-03-04 RX ORDER — FLUTICASONE PROPIONATE 50 MCG
2 SPRAY, SUSPENSION (ML) NASAL DAILY
Qty: 1 EACH | Refills: 5 | Status: SHIPPED | OUTPATIENT
Start: 2022-03-04 | End: 2022-10-31

## 2022-03-04 NOTE — PROGRESS NOTES
THIS VISIT WAS COMPLETED VIRTUALLY USING DOXY. ME    PARIS Stovall is a 64 y.o. female who presents for fatigue following respiratory illness January. He had an appointment for this January 27 and feels like she was sick for another week then turned a corner but still remained pretty fatigued for the month of February. Feels like she is just now able to get up and do things but is easily exhausted. Gives an example where she went to the store for a couple hours and was so tired she slept for 24 hours. Is also getting little dizzy with standing. Reportedly graying out. Wolfashanti Beasley to the emergency room 2/22. Blood pressure was elevated. Blood pressure at home recently has been in the 1 teens. She is not taking tramadol. Does not think she is taking it this month. She is trying to avoid Flexeril. She is taking gabapentin 300, 200, 200300 for lower extremity pain    PMHx:  Past Medical History:   Diagnosis Date    Diabetes ([facility] [address]     Fibula fracture     right    [facility] ([facility])     Myocardial infarct ([facility])        Meds:   Current Outpatient Medications   Medication Sig Dispense Refill    fluticasone propionate (FLONASE) 50 mcg/actuation nasal spray 2 Sprays by Both Nostrils route daily. 1 Each 5    gabapentin (NEURONTIN) 300 mg capsule TAKE 3 CAPSULES BY MOUTH THREE TIMES DAILY 270 Capsule 0    ondansetron (ZOFRAN ODT) 8 mg disintegrating tablet Take 1 Tablet by mouth every eight (8) hours as needed for Nausea or Vomiting. 21 Tablet 1    aspirin 81 mg chewable tablet TAKE 4 TABLETS BY MOUTH EVERY DAY 90 Tablet 3    lidocaine (LIDODERM) 5 % APPLY 1 PATCH TOPICALLY ONCE DAILY EVERY 12 HOURS 90 Patch 1    nicotine (NICODERM CQ) 21 mg/24 hr 1 Patch by TransDERmal route every twenty-four (24) hours. 28 Patch 3    albuterol (PROVENTIL VENTOLIN) 2.5 mg /3 mL (0.083 %) nebu 3 mL by Nebulization route every four (4) hours as needed for Wheezing.  100 Each 2    albuterol (PROVENTIL HFA, VENTOLIN HFA, PROAIR HFA) 90 mcg/actuation inhaler Take 1 Puff by inhalation every four (4) hours as needed for Wheezing. 18 g 2    traMADoL (ULTRAM) 50 mg tablet Take 50 mg by mouth every six (6) hours as needed for Pain.  Linzess 290 mcg cap capsule       omeprazole (PRILOSEC) 20 mg capsule       DULoxetine (CYMBALTA) 60 mg capsule Take 1 Capsule by mouth two (2) times a day. 180 Capsule 3    fluticasone (Children's Flonase Sensimist) 27.5 mcg/actuation nasal spray USE 2 SPRAYS BY NASAL   ONCE DAILY 10 g 11    OTHER Rolling walker with seat. Standard. 249 pounds 1 Each 0    OTHER Shower chair. Standard. 249 pounds 1 Each 0    cholecalciferol (VITAMIN D3) (50,000 UNITS /1250 MCG) capsule Take 1 Capsule by mouth every seven (7) days. 13 Capsule 3    cyclobenzaprine (FLEXERIL) 5 mg tablet TAKE 1 TABLET BY MOUTH NIGHTLY AS NEEDED FOR MUSCLE SPASM(S). 30 Tab 3    ondansetron (ZOFRAN ODT) 8 mg disintegrating tablet Take 1 Tab by mouth every eight (8) hours as needed for Nausea. 30 Tab 2    nitroglycerin (NITROSTAT) 0.4 mg SL tablet 1 Tab by SubLINGual route every five (5) minutes as needed for Chest Pain. Up to 3 doses. 30 Tab 1    metoprolol tartrate (LOPRESSOR) 25 mg tablet Take 25 mg by mouth daily.  fenofibrate (LOFIBRA) 160 mg tablet Take 1 Tab by mouth daily. 90 Tab 3    Nebulizer & Compressor machine 1 Each by Does Not Apply route every four (4) hours as needed for Wheezing. 1 Each 0    latanoprost (XALATAN) 0.005 % ophthalmic solution Apply 1 Drop to eye daily. 11    insulin lispro (HUMALOG U-100 INSULIN) 100 unit/mL injection 30 Units by SubCUTAneous route Before breakfast, lunch, and dinner. (Patient taking differently: 40 Units by SubCUTAneous route Before breakfast, lunch, dinner and at bedtime.) 81 mL 3    Walker misc Please dispense a rolling walkerIt M54.5, G89.29, R26.9 1 Each 0    ONETOUCH ULTRA TEST strip       LANTUS 100 unit/mL injection 90 Units by SubCUTAneous route two (2) times a day.       Insulin Syringe-Needle U-100 1 mL 31 x 5/16\" syrg   3    furosemide (LASIX) 40 mg tablet Take 40 mg by mouth as needed. (Patient not taking: Reported on 3/4/2022)      diphenhydrAMINE (BENADRYL) 25 mg capsule Take 25 mg by mouth every six (6) hours as needed. (Patient not taking: Reported on 1/27/2022)         Allergies: Allergies   Allergen Reactions    Atorvastatin Other (comments)     Violent vomiting      Hydrocortisone Other (comments) and Unknown (comments)     Made wound worse when applied  Made wound worse when applied      Macrobid [Nitrofurantoin Monohyd/M-Cryst] Anaphylaxis    Methocarbamol Other (comments) and Nausea and Vomiting    Bacitracin Other (comments) and Unknown (comments)    Celexa [Citalopram] Other (comments)    Diflucan [Fluconazole] Nausea and Vomiting    Flagyl [Metronidazole] Hives    Lisinopril Other (comments)     Low BP    Metformin Other (comments)    Methylprednisolone Palpitations     Adverse reaction to methylprednisolone 500 mg. Side effects: Severe hypertension, severe hyper glycemia  Tolerates prednisone 40 mg    Metoclopramide Other (comments) and Unknown (comments)    Neomycin-Bacitracnzn-Polymyxnb Unknown (comments)    Neosporin [Benzalkonium Chloride] Other (comments)    Pcn [Penicillins] Hives    Silver Rash     Silver tape    Sulfa (Sulfonamide Antibiotics) Hives    Sulfamethoxazole-Trimethoprim Unknown (comments)       Smoker:  Social History     Tobacco Use   Smoking Status Former Smoker   Smokeless Tobacco Never Used       ETOH:   Social History     Substance and Sexual Activity   Alcohol Use No       FH:   Family History   Problem Relation Age of Onset    Cancer Mother     Diabetes Mother     Heart Disease Mother     Heart Disease Father     Diabetes Father        ROS:   As listed in HPI.  In addition:  Constitutional:   No headache, fever, fatigue, weight loss or weight gain      Cardiac:    No chest pain      Resp:   No cough or shortness of breath      Neuro   No loss of consciousness, dizziness, seizures      Physical Exam:  Last menstrual period 02/19/2011. GEN: No apparent distress. Alert and oriented and responds to all questions appropriately. NEUROLOGIC:  No focal neurologic deficits. Coordination and gait grossly intact. EXT: Well perfused. No edema. SKIN: No obvious rashes. Due to this being a TeleHealth evaluation, many elements of the physical examination are unable to be assessed. Assessment and Plan     Hypertension  Having some orthostatic dizziness  Recommend she try cutting metoprolol in half    Fatigue  Consider drug side effect i.e. too much gabapentin  Consider flare of her fibromyalgia following her illness and encouraged her to try to get up as much as practical but the pain is resolved so she does not need to take long naps in between bouts of activity    She does notice that her lower extremity pain is helped by activity and stretches    Rhinitis  Requesting refill on her Flonase      ICD-10-CM ICD-9-CM    1. Essential hypertension  I10 401.9    2. Allergic rhinitis due to pollen, unspecified seasonality  J30.1 477.0 fluticasone propionate (FLONASE) 50 mcg/actuation nasal spray   3. Neuropathic pain  M79.2 729.2    4. Body aches  R52 780.96          Pursuant to the emergency declaration under the [facility], [facility] waiver authority and the [employer] Act, this Virtual  Visit was conducted, with patient's consent, to reduce the patient's risk of exposure to COVID-19 and provide continuity of care for an established patient. Services were provided through a video synchronous discussion virtually to substitute for in-person clinic visit.

## 2022-03-04 NOTE — PROGRESS NOTES
1. Have you been to the ER, urgent care clinic since your last visit? Hospitalized since your last visit? Yes, on file. 2. Have you seen or consulted any other health care providers outside of the [address] [place] [address] since your last visit? Include any pap smears or colon screening.  No    Health Maintenance Due   Topic Date Due    Hepatitis C Screening  Never done    Cervical cancer screen  Never done    Shingrix Vaccine Age 50> (1 of 2) Never done    MICROALBUMIN Q1  07/10/2019    Lipid Screen  06/26/2021    A1C test (Diabetic or Prediabetic)  08/19/2021    Flu Vaccine (1) 09/01/2021     Chief Complaint   Patient presents with    Concern For COVID-19 (Coronavirus)    Medication Evaluation     Flonase

## 2022-03-06 DIAGNOSIS — F17.218 CIGARETTE NICOTINE DEPENDENCE WITH OTHER NICOTINE-INDUCED DISORDER: ICD-10-CM

## 2022-03-06 DIAGNOSIS — J45.20 MILD INTERMITTENT ASTHMA WITHOUT COMPLICATION: ICD-10-CM

## 2022-03-07 RX ORDER — DIPHENHYDRAMINE HCL 25 MG
4 CAPSULE ORAL AS NEEDED
Qty: 100 EACH | Refills: 1 | Status: SHIPPED | OUTPATIENT
Start: 2022-03-07 | End: 2022-04-06

## 2022-03-07 RX ORDER — ALBUTEROL SULFATE 90 UG/1
AEROSOL, METERED RESPIRATORY (INHALATION)
Qty: 18 EACH | Refills: 2 | Status: SHIPPED | OUTPATIENT
Start: 2022-03-07

## 2022-03-18 PROBLEM — Z79.4 TYPE 2 DIABETES MELLITUS WITH COMPLICATION, WITH LONG-TERM CURRENT USE OF INSULIN (HCC): Status: ACTIVE | Noted: 2018-09-14

## 2022-03-18 PROBLEM — E11.8 TYPE 2 DIABETES MELLITUS WITH COMPLICATION, WITH LONG-TERM CURRENT USE OF INSULIN (HCC): Status: ACTIVE | Noted: 2018-09-14

## 2022-03-18 PROBLEM — E78.1 HYPERTRIGLYCERIDEMIA: Status: ACTIVE | Noted: 2018-09-14

## 2022-03-19 PROBLEM — E11.40 TYPE 2 DIABETES MELLITUS WITH DIABETIC NEUROPATHY (HCC): Status: ACTIVE | Noted: 2019-11-19

## 2022-03-19 PROBLEM — E66.01 OBESITY, MORBID (HCC): Status: ACTIVE | Noted: 2018-08-23

## 2022-03-19 PROBLEM — I10 ESSENTIAL HYPERTENSION: Status: ACTIVE | Noted: 2018-09-14

## 2022-03-19 PROBLEM — I25.10 CORONARY ARTERY DISEASE INVOLVING NATIVE HEART WITHOUT ANGINA PECTORIS: Status: ACTIVE | Noted: 2018-09-14

## 2022-03-19 PROBLEM — E11.21 TYPE 2 DIABETES WITH NEPHROPATHY (HCC): Status: ACTIVE | Noted: 2018-10-04

## 2022-03-20 PROBLEM — M54.9 BACK PAIN: Status: ACTIVE | Noted: 2018-09-14

## 2022-03-20 PROBLEM — Z95.1 S/P CABG X 3: Status: ACTIVE | Noted: 2018-09-14

## 2022-04-13 RX ORDER — FENOFIBRATE 160 MG/1
TABLET ORAL
Qty: 90 TABLET | Refills: 3 | Status: SHIPPED | OUTPATIENT
Start: 2022-04-13

## 2022-05-13 ENCOUNTER — OFFICE VISIT (OUTPATIENT)
Dept: FAMILY MEDICINE CLINIC | Age: 57
End: 2022-05-13
Payer: MEDICAID

## 2022-05-13 VITALS
WEIGHT: 248 LBS | DIASTOLIC BLOOD PRESSURE: 74 MMHG | BODY MASS INDEX: 41.32 KG/M2 | OXYGEN SATURATION: 96 % | HEIGHT: 65 IN | RESPIRATION RATE: 16 BRPM | HEART RATE: 70 BPM | TEMPERATURE: 98.6 F | SYSTOLIC BLOOD PRESSURE: 133 MMHG

## 2022-05-13 DIAGNOSIS — R10.31 RIGHT INGUINAL PAIN: Primary | ICD-10-CM

## 2022-05-13 DIAGNOSIS — I10 ESSENTIAL HYPERTENSION: ICD-10-CM

## 2022-05-13 DIAGNOSIS — E11.40 TYPE 2 DIABETES MELLITUS WITH DIABETIC NEUROPATHY, WITH LONG-TERM CURRENT USE OF INSULIN (HCC): ICD-10-CM

## 2022-05-13 DIAGNOSIS — E04.1 THYROID NODULE: ICD-10-CM

## 2022-05-13 DIAGNOSIS — B35.3 TINEA PEDIS OF RIGHT FOOT: ICD-10-CM

## 2022-05-13 DIAGNOSIS — Z79.4 TYPE 2 DIABETES MELLITUS WITH DIABETIC NEUROPATHY, WITH LONG-TERM CURRENT USE OF INSULIN (HCC): ICD-10-CM

## 2022-05-13 LAB — HBA1C MFR BLD HPLC: 9.5 %

## 2022-05-13 PROCEDURE — 99214 OFFICE O/P EST MOD 30 MIN: CPT | Performed by: FAMILY MEDICINE

## 2022-05-13 PROCEDURE — 3046F HEMOGLOBIN A1C LEVEL >9.0%: CPT | Performed by: FAMILY MEDICINE

## 2022-05-13 RX ORDER — CHLORPHENIRAMINE MALEATE 4 MG
TABLET ORAL 2 TIMES DAILY
Qty: 45 G | Refills: 1 | Status: SHIPPED | OUTPATIENT
Start: 2022-05-13 | End: 2022-06-12

## 2022-05-13 RX ORDER — ACETAMINOPHEN 325 MG/1
TABLET ORAL
COMMUNITY

## 2022-05-13 NOTE — PROGRESS NOTES
HPI  Dillon Stovall is a 64 y.o. female who with a concern about a possible right groin abscess and an itchy rash on her feet. She had an abscess right groin that was lanced in the emergency room 4/21 and was placed on Keflex and followed up with urgent care was switched over to clindamycin. She felt like the clindamycin was more effective. Finish the course and felt like the area was fully healed until a few days ago when she noticed some pain in the area on wiping. Cannot really see this area. She was worried about a recurrence of an abscess. Has had itchy rash on her feet for several weeks. Has tried lotion but no other remedies. She had a cardiac catheterization recently and was told that she would need to do a better job with her cholesterol. She is intolerant to several statins and I think the conversation was started about cholesterol injection. Some of her diabetes medicine was reduced by her new endocrinologist.  She was told that maybe she would be getting an insulin pump. Reports that she is waking up with blood sugars in the 220s. Given example of going to bed last night with blood sugar 150 and waking up in the 220s this morning. Taking Lantus 80 units twice daily and Humalog 40 units 4 times daily with some kind of correction scale. A1c was 9.5 on 4/18 during a hospital stay    PMHx:  Past Medical History:   Diagnosis Date    Diabetes ([facility]     Fibula fracture     right    Hip fracture (HCC)     Myocardial infarct ([facility]        Meds:   Current Outpatient Medications   Medication Sig Dispense Refill    acetaminophen (TylenoL) 325 mg tablet Take  by mouth every four (4) hours as needed for Pain.  SENNA-DOCUSATE SODIUM PO Take  by mouth.  clotrimazole (LOTRIMIN) 1 % topical cream Apply  to affected area two (2) times a day for 30 days.  45 g 1    fenofibrate (LOFIBRA) 160 mg tablet TAKE 1 TABLET BY MOUTH EVERY DAY 90 Tablet 3    albuterol (PROVENTIL HFA, VENTOLIN HFA, PROAIR HFA) 90 mcg/actuation inhaler INHALE 1 PUFF BY MOUTH EVERY 4 HOURS AS NEEDED FOR WHEEZE 18 Each 2    fluticasone propionate (FLONASE) 50 mcg/actuation nasal spray 2 Sprays by Both Nostrils route daily. 1 Each 5    gabapentin (NEURONTIN) 300 mg capsule TAKE 3 CAPSULES BY MOUTH THREE TIMES DAILY 270 Capsule 0    ondansetron (ZOFRAN ODT) 8 mg disintegrating tablet Take 1 Tablet by mouth every eight (8) hours as needed for Nausea or Vomiting. 21 Tablet 1    aspirin 81 mg chewable tablet TAKE 4 TABLETS BY MOUTH EVERY DAY 90 Tablet 3    lidocaine (LIDODERM) 5 % APPLY 1 PATCH TOPICALLY ONCE DAILY EVERY 12 HOURS (Patient taking differently: as needed. APPLY 1 PATCH TOPICALLY ONCE DAILY EVERY 12 HOURS) 90 Patch 1    nicotine (NICODERM CQ) 21 mg/24 hr 1 Patch by TransDERmal route every twenty-four (24) hours. 28 Patch 3    albuterol (PROVENTIL VENTOLIN) 2.5 mg /3 mL (0.083 %) nebu 3 mL by Nebulization route every four (4) hours as needed for Wheezing. 100 Each 2    traMADoL (ULTRAM) 50 mg tablet Take 50 mg by mouth every six (6) hours as needed for Pain.  Linzess 290 mcg cap capsule       DULoxetine (CYMBALTA) 60 mg capsule Take 1 Capsule by mouth two (2) times a day. 180 Capsule 3    fluticasone (Children's Flonase Sensimist) 27.5 mcg/actuation nasal spray USE 2 SPRAYS BY NASAL   ONCE DAILY 10 g 11    OTHER Rolling walker with seat. Standard. 249 pounds 1 Each 0    OTHER Shower chair. Standard. 249 pounds 1 Each 0    cholecalciferol (VITAMIN D3) (50,000 UNITS /1250 MCG) capsule Take 1 Capsule by mouth every seven (7) days. 13 Capsule 3    cyclobenzaprine (FLEXERIL) 5 mg tablet TAKE 1 TABLET BY MOUTH NIGHTLY AS NEEDED FOR MUSCLE SPASM(S). 30 Tab 3    ondansetron (ZOFRAN ODT) 8 mg disintegrating tablet Take 1 Tab by mouth every eight (8) hours as needed for Nausea. 30 Tab 2    nitroglycerin (NITROSTAT) 0.4 mg SL tablet 1 Tab by SubLINGual route every five (5) minutes as needed for Chest Pain.  Up to 3 doses. 30 Tab 1    metoprolol tartrate (LOPRESSOR) 25 mg tablet Take 25 mg by mouth daily.  Nebulizer & Compressor machine 1 Each by Does Not Apply route every four (4) hours as needed for Wheezing. 1 Each 0    latanoprost (XALATAN) 0.005 % ophthalmic solution Apply 1 Drop to eye daily. 11    insulin lispro (HUMALOG U-100 INSULIN) 100 unit/mL injection 30 Units by SubCUTAneous route Before breakfast, lunch, and dinner. (Patient taking differently: 40 Units by SubCUTAneous route Before breakfast, lunch, dinner and at bedtime.) 81 mL 3    Walker misc Please dispense a rolling walkerIt M54.5, G89.29, R26.9 1 Each 0    ONETOUCH ULTRA TEST strip       LANTUS 100 unit/mL injection 80 Units by SubCUTAneous route two (2) times a day.  Insulin Syringe-Needle U-100 1 mL 31 x 5/16\" syrg   3    omeprazole (PRILOSEC) 20 mg capsule  (Patient not taking: Reported on 5/13/2022)      furosemide (LASIX) 40 mg tablet Take 40 mg by mouth as needed. (Patient not taking: Reported on 3/4/2022)      diphenhydrAMINE (BENADRYL) 25 mg capsule Take 25 mg by mouth every six (6) hours as needed. (Patient not taking: Reported on 1/27/2022)         Allergies: Allergies   Allergen Reactions    Atorvastatin Other (comments)     Violent vomiting      Hydrocortisone Other (comments) and Unknown (comments)     Made wound worse when applied  Made wound worse when applied      Macrobid [Nitrofurantoin Monohyd/M-Cryst] Anaphylaxis    Methocarbamol Other (comments) and Nausea and Vomiting    Bacitracin Other (comments) and Unknown (comments)    Celexa [Citalopram] Other (comments)    Diflucan [Fluconazole] Nausea and Vomiting    Flagyl [Metronidazole] Hives    Lisinopril Other (comments)     Low BP    Metformin Other (comments)    Methylprednisolone Palpitations     Adverse reaction to methylprednisolone 500 mg.   Side effects: Severe hypertension, severe hyper glycemia  Tolerates prednisone 40 mg    Metoclopramide Other (comments) and Unknown (comments)    Neomycin-Bacitracnzn-Polymyxnb Unknown (comments)    Neosporin [Benzalkonium Chloride] Other (comments)    Pcn [Penicillins] Hives    Silver Rash     Silver tape    Sulfa (Sulfonamide Antibiotics) Hives    Sulfamethoxazole-Trimethoprim Unknown (comments)       Smoker:  Social History     Tobacco Use   Smoking Status Former Smoker   Smokeless Tobacco Never Used       ETOH:   Social History     Substance and Sexual Activity   Alcohol Use No       FH:   Family History   Problem Relation Age of Onset    Cancer Mother     Diabetes Mother     Heart Disease Mother     Heart Disease Father     Diabetes Father        ROS:   As listed in HPI. In addition:  Constitutional:   No headache, fever, fatigue, weight loss or weight gain      Cardiac:    No chest pain      Resp:   No cough or shortness of breath      Neuro   No loss of consciousness, dizziness, seizures      Physical Exam:  Blood pressure 133/74, pulse 70, temperature 98.6 °F (37 °C), temperature source Oral, resp. rate 16, height 5' 5\" (1.651 m), weight 248 lb (112.5 kg), last menstrual period 02/19/2011, SpO2 96 %. GEN: No apparent distress. Alert and oriented and responds to all questions appropriately. NEUROLOGIC:  No focal neurologic deficits. Strength and sensation grossly intact. Coordination and gait grossly intact. EXT: Well perfused. No edema. SKIN: Right groin examined. There is a healed I&D site medial thigh 3 inches distal to inguinal crease with a little bit of scar tissue but no evidence of a recurrent abscess. The pain that she is experiencing is in the gracilis muscle about an inch away. There is no sign of cellulitis in the area. Mild tinea pedis of the right foot       Assessment and Plan     Tinea pedis  Treatment  Desenex    She does not appear to have a recurrence of her abscess. Provided reassurance. Pain in the area is muscular.   We discussed stretches for this    Thyroid nodule  Recalls being told that she had a 0.6 cm thyroid nodule that needs to be followed up annually. She perceives that the right side of her thyroid is little tender in the last month. She is interested in updating her thyroid ultrasound. I offered to order it, she does have an endocrinologist and will also bring this up at her appointment next month. Diabetes  Care of endocrinology  Last A1c 9.5%  Thinks that she might be on the path to getting a insulin pump  Currently taking Lantus 80 units twice daily  Humalog 40 units 4 times daily  Taken off of Trulicity. She is unclear of the rationale for this probably combination of diabetic retinopathy and evaluating her for pump          ICD-10-CM ICD-9-CM    1. Right inguinal pain  R10.31 789.03    2. Thyroid nodule  E04.1 241.0 US THYROID/PARATHYROID/SOFT TISS   3. Essential hypertension  I10 401.9    4. Type 2 diabetes mellitus with diabetic neuropathy, with long-term current use of insulin ([facility])  E11.40 250.60     Z79.4 357.2      V58.67    5. Tinea pedis of right foot  B35.3 110.4        AVS given.  Pt expressed understanding of instructions

## 2022-05-13 NOTE — PROGRESS NOTES
1. \"Have you been to the ER, urgent care clinic since your last visit? Hospitalized since your last visit? \" Yes, on file. 2. \"Have you seen or consulted any other health care providers outside of the [address] [place] [address] since your last visit? \" Yes, Cardiology, heart catheterization 05/05/22    3. For patients aged 39-70: Has the patient had a colonoscopy / FIT/ Cologuard? Yes, 07/22/2021    If the patient is female:    4. For patients aged 41-77: Has the patient had a mammogram within the past 2 years? Yes, at [facility] 02/28/2022      5. For patients aged 21-65: Has the patient had a pap smear?  Yes, at [facility] 04/18/2022    Health Maintenance Due   Topic Date Due    Hepatitis C Screening  Never done    Cervical cancer screen  Never done    Shingrix Vaccine Age 50> (1 of 2) Never done    MICROALBUMIN Q1  07/10/2019    Pneumococcal 0-64 years (2 - PCV) 02/01/2020    Foot Exam Q1  05/26/2022

## 2022-05-27 ENCOUNTER — HOSPITAL ENCOUNTER (OUTPATIENT)
Dept: ULTRASOUND IMAGING | Age: 57
Discharge: HOME | End: 2022-05-27
Attending: FAMILY MEDICINE
Payer: MEDICAID

## 2022-05-27 ENCOUNTER — TELEPHONE (OUTPATIENT)
Dept: FAMILY MEDICINE CLINIC | Age: 57
End: 2022-05-27

## 2022-05-27 DIAGNOSIS — E04.1 THYROID NODULE: ICD-10-CM

## 2022-05-27 PROCEDURE — 76536 US EXAM OF HEAD AND NECK: CPT

## 2022-05-27 NOTE — TELEPHONE ENCOUNTER
Ultrasound of thyroid reviewed. No significant change to her thyroid nodule.   Annual follow-up recommended

## 2022-06-08 RX ORDER — GUAIFENESIN 100 MG/5ML
LIQUID (ML) ORAL
Qty: 90 TABLET | Refills: 3 | Status: SHIPPED | OUTPATIENT
Start: 2022-06-08

## 2022-07-15 ENCOUNTER — TELEPHONE (OUTPATIENT)
Dept: FAMILY MEDICINE CLINIC | Age: 57
End: 2022-07-15

## 2022-07-15 RX ORDER — CLINDAMYCIN HYDROCHLORIDE 150 MG/1
300 CAPSULE ORAL 3 TIMES DAILY
Qty: 42 CAPSULE | Refills: 0 | Status: SHIPPED | OUTPATIENT
Start: 2022-07-15 | End: 2022-07-22

## 2022-07-15 NOTE — TELEPHONE ENCOUNTER
Clindamycin called in
Pt is stating that she had 3 teeth pulled on Wednesday and is still in pain; pt states that her left side of her face is still in pain, swollen, her eye is hurting as well; pt states that she is unable to lie down due to her being in sever pain; pt states that she has called her dentist office numerous times and they can fit her in on Tuesday; pt also states that she has been nauseous as well; Pt is requesting an antibiotic for this ASAP; please call pt back as she is in sever pain and states she doesn't want to go through the weekend in this pain       Thank You
Intractable vomiting

## 2022-07-22 RX ORDER — ASPIRIN 325 MG
50000 TABLET, DELAYED RELEASE (ENTERIC COATED) ORAL
Qty: 13 CAPSULE | Refills: 3 | Status: SHIPPED | OUTPATIENT
Start: 2022-07-22

## 2022-07-25 DIAGNOSIS — M79.2 NEUROPATHIC PAIN: ICD-10-CM

## 2022-07-25 RX ORDER — GABAPENTIN 300 MG/1
CAPSULE ORAL
Qty: 270 CAPSULE | Refills: 1 | Status: SHIPPED | OUTPATIENT
Start: 2022-07-25

## 2022-07-25 NOTE — TELEPHONE ENCOUNTER
Pt is calling requesting a refill on her med out of med    Requested Prescriptions     Pending Prescriptions Disp Refills    gabapentin (NEURONTIN) 300 mg capsule 270 Capsule 0

## 2022-09-29 DIAGNOSIS — M79.2 NEUROPATHIC PAIN: ICD-10-CM

## 2022-09-30 RX ORDER — PEN NEEDLE, DIABETIC 29 G X1/2"
NEEDLE, DISPOSABLE MISCELLANEOUS
Qty: 300 EACH | Refills: 11 | Status: SHIPPED | OUTPATIENT
Start: 2022-09-30

## 2022-09-30 RX ORDER — LIDOCAINE 50 MG/G
PATCH TOPICAL
Qty: 90 PATCH | Refills: 1 | Status: SHIPPED | OUTPATIENT
Start: 2022-09-30

## 2022-10-31 DIAGNOSIS — J30.1 ALLERGIC RHINITIS DUE TO POLLEN, UNSPECIFIED SEASONALITY: ICD-10-CM

## 2022-10-31 RX ORDER — FLUTICASONE PROPIONATE 50 MCG
SPRAY, SUSPENSION (ML) NASAL
Qty: 1 EACH | Refills: 5 | Status: SHIPPED | OUTPATIENT
Start: 2022-10-31

## 2022-11-07 RX ORDER — GUAIFENESIN 100 MG/5ML
LIQUID (ML) ORAL
Qty: 90 TABLET | Refills: 3 | Status: SHIPPED | OUTPATIENT
Start: 2022-11-07

## 2022-11-25 ENCOUNTER — TELEPHONE (OUTPATIENT)
Dept: FAMILY MEDICINE CLINIC | Age: 57
End: 2022-11-25

## 2022-11-25 ENCOUNTER — VIRTUAL VISIT (OUTPATIENT)
Dept: FAMILY MEDICINE CLINIC | Age: 57
End: 2022-11-25
Payer: MEDICAID

## 2022-11-25 DIAGNOSIS — J06.9 UPPER RESPIRATORY TRACT INFECTION, UNSPECIFIED TYPE: Primary | ICD-10-CM

## 2022-11-25 DIAGNOSIS — J45.20 MILD INTERMITTENT ASTHMA WITHOUT COMPLICATION: ICD-10-CM

## 2022-11-25 PROCEDURE — 99443 PR PHYS/QHP TELEPHONE EVALUATION 21-30 MIN: CPT | Performed by: FAMILY MEDICINE

## 2022-11-25 RX ORDER — DOXYCYCLINE 100 MG/1
100 CAPSULE ORAL 2 TIMES DAILY
Qty: 20 CAPSULE | Refills: 0 | Status: SHIPPED | OUTPATIENT
Start: 2022-11-25 | End: 2022-12-05

## 2022-11-25 RX ORDER — DIAZEPAM 5 MG/1
2.5 TABLET ORAL AS NEEDED
COMMUNITY
Start: 2022-09-24

## 2022-11-25 RX ORDER — ALBUTEROL SULFATE 90 UG/1
AEROSOL, METERED RESPIRATORY (INHALATION)
Qty: 18 EACH | Refills: 2 | Status: SHIPPED | OUTPATIENT
Start: 2022-11-25

## 2022-11-25 NOTE — PROGRESS NOTES
Health Maintenance Due   Topic Date Due    Hepatitis C Screening  Never done    Hepatitis B Vaccine (1 of 3 - Risk 3-dose series) Never done    Shingrix Vaccine Age 50> (1 of 2) Never done    MICROALBUMIN Q1  07/10/2019    Pneumococcal 0-64 years (2 - PCV) 02/01/2020    COVID-19 Vaccine (4 - Booster for Pfizer series) 02/09/2022    Foot Exam Q1  05/26/2022    A1C test (Diabetic or Prediabetic)  07/18/2022    Flu Vaccine (1) 08/01/2022     1. \"Have you been to the ER, urgent care clinic since your last visit? Hospitalized since your last visit? \"  Yes.      2. \"Have you seen or consulted any other health care providers outside of the [address] [place] [address] since your last visit? \" No     3. For patients aged 39-70: Has the patient had a colonoscopy / FIT/ Cologuard? Yes - Care Gap present. Most recent result on file      If the patient is female:    4. For patients aged 41-77: Has the patient had a mammogram within the past 2 years? Yes - Care Gap present. Most recent result on file      5. For patients aged 21-65: Has the patient had a pap smear? Yes - Care Gap present.  Most recent result on file

## 2022-11-25 NOTE — PROGRESS NOTES
Martell Stovall is a 62 y.o. female evaluated via audio only technology on 11/25/2022. I communicated with the patient and/or health care decision maker about the nature and details of the following:  Assessment & Plan:   URI  Given the 13 days and the comorbidities antibiotic is not a reasonable. We will try doxycycline  Explained that this is most likely a viral illness and that she is on the tail end of it. Was really sick for the first week and has had more of an intermittent course for the second week  Could benefit from increase hydration  Adjusted her Flonase technique  Refilled her albuterol    She did test herself for COVID it was negative. Symptoms sound very much like COVID. [facility] [place] [facility] and has been doing her best to keep to herself to prevent spreading illness    12  Subjective:   Misha Stovall is a 62 y.o. female who was seen for Flu Like Symptoms (* 2 WEEKS. TRIED TYLENOL, ZINC)      Symptoms started 11/12. Feels tired, achy, body aches, low-grade fever. Most recent temperature measured 100.4. Cold chills, sweats, congested, just generally miserable. Is taking zinc Tylenol which seems to help with the fever. Is not short of breath but does feel a intermittent tightness in the chest that makes her want a cough. Had a old albuterol inhaler that she tried and is not sure how well it works. Does not describe jacquelyn shortness of breath. Is using Flonase sparingly and feels like it works for about 20 minutes    Prior to Admission medications    Medication Sig Start Date End Date Taking? Authorizing Provider   diazePAM (VALIUM) 5 mg tablet Take 2.5 mg by mouth as needed. 9/24/22  Yes Provider, Historical   albuterol (PROVENTIL HFA, VENTOLIN HFA, PROAIR HFA) 90 mcg/actuation inhaler INHALE 1 PUFF BY MOUTH EVERY 4 HOURS AS NEEDED FOR WHEEZE 11/25/22  Yes Violet Michael MD   doxycycline (VIBRAMYCIN) 100 mg capsule Take 1 Capsule by mouth two (2) times a day for 10 days. 11/25/22 12/5/22 Yes Ruthann Van MD   aspirin 81 mg chewable tablet TAKE 4 TABLETS BY MOUTH EVERY DAY 11/7/22  Yes Ruthann Van MD   fluticasone propionate [facility]) 50 mcg/actuation nasal spray SPRAY 2 SPRAYS INTO EACH NOSTRIL EVERY DAY 10/31/22  Yes Ruthann Van MD   lidocaine (LIDODERM) 5 % APPLY 1 PATCH TOPICALLY ONCE DAILY EVERY 12 HOURS 9/30/22  Yes Ruthann Van MD   BD Insulin Syringe Ultra-Fine 1 mL 31 gauge x 5/16 syrg USE TO INJECT INSULIN 6 TIMES DAILY 9/30/22  Yes Ruthann Van MD   gabapentin (NEURONTIN) 300 mg capsule TAKE 3 CAPSULES BY MOUTH THREE TIMES DAILY 7/25/22  Yes Ruthann Van MD   cholecalciferol (VITAMIN D3) (50,000 UNITS /1250 MCG) capsule TAKE 1 CAPSULE BY MOUTH EVERY SEVEN (7) DAYS. 7/22/22  Yes Ruthann Van MD   acetaminophen (TYLENOL) 325 mg tablet Take  by mouth every four (4) hours as needed for Pain. Yes Provider, Historical   SENNA-DOCUSATE SODIUM PO Take  by mouth. Yes Provider, Historical   fenofibrate (LOFIBRA) 160 mg tablet TAKE 1 TABLET BY MOUTH EVERY DAY 4/13/22  Yes Ruthann Van MD   albuterol (PROVENTIL VENTOLIN) 2.5 mg /3 mL (0.083 %) nebu 3 mL by Nebulization route every four (4) hours as needed for Wheezing. 12/2/21  Yes Ruthann Van MD   traMADoL  [place]ie) 50 mg tablet Take 50 mg by mouth every six (6) hours as needed for Pain. Yes Provider, Historical   Linzess 290 mcg cap capsule  11/3/21  Yes Provider, Historical   DULoxetine (CYMBALTA) 60 mg capsule Take 1 Capsule by mouth two (2) times a day. 12/1/21  Yes Ruthann Van MD   fluticasone (Children's Flonase Sensimist) 27.5 mcg/actuation nasal spray USE 2 SPRAYS BY NASAL   ONCE DAILY 12/1/21  Yes MD SANNA Avila Rolling walker with seat. Standard. 249 pounds 6/25/21  Yes Ruthann Shorts, MD   OTHER Shower chair. Standard.   249 pounds 6/25/21  Yes Ruthann Van MD   ondansetron [facility] ODT) 8 mg disintegrating tablet Take 1 Tab by mouth every eight (8) hours as needed for Nausea. 1/26/21  Yes Russ Tomas MD   nitroglycerin (NITROSTAT) 0.4 mg SL tablet 1 Tab by SubLINGual route every five (5) minutes as needed for Chest Pain. Up to 3 doses. 11/5/20  Yes Russ Tomas MD   metoprolol tartrate (LOPRESSOR) 25 mg tablet Take 25 mg by mouth daily. 8/16/18  Yes Provider, Historical   Nebulizer & Compressor machine 1 Each by Does Not Apply route every four (4) hours as needed for Wheezing. 2/17/20  Yes Russ Tomas MD   latanoprost (XALATAN) 0.005 % ophthalmic solution Apply 1 Drop to eye daily. 10/31/19  Yes Provider, Historical   insulin lispro (HUMALOG U-100 INSULIN) 100 unit/mL injection 30 Units by SubCUTAneous route Before breakfast, lunch, and dinner. Patient taking differently: 40 Units by SubCUTAneous route Before breakfast, lunch, dinner and at bedtime. 2/18/19  Yes Russ Tomas MD   [facility] Please dispense a rolling walkerIt M54.5, G89.29, R26.9 10/18/18  Yes Russ Tomas MD   [facility] ULTRA TEST strip  11/5/15  Yes Provider, Historical   LANTUS 100 unit/mL injection 80 Units by SubCUTAneous route two (2) times a day. 11/29/15  Yes Provider, Historical     Allergies   Allergen Reactions    Atorvastatin Other (comments)     Violent vomiting      Hydrocortisone Other (comments) and Unknown (comments)     Made wound worse when applied  Made wound worse when applied      Macrobid [Nitrofurantoin Monohyd/M-Cryst] Anaphylaxis    Methocarbamol Other (comments) and Nausea and Vomiting    Bacitracin Other (comments) and Unknown (comments)    Celexa [Citalopram] Other (comments)    Diflucan [Fluconazole] Nausea and Vomiting    Flagyl [Metronidazole] Hives    Lisinopril Other (comments)     Low BP    Metformin Other (comments)    Methylprednisolone Palpitations     Adverse reaction to methylprednisolone 500 mg.   Side effects: Severe hypertension, severe hyper glycemia  Tolerates prednisone 40 mg    Metoclopramide Other (comments) and Unknown (comments) Neomycin-Bacitracnzn-Polymyxnb Unknown (comments)    Neosporin [Benzalkonium Chloride] Other (comments)    Pcn [Penicillins] Hives    Silver Rash     Silver tape    Sulfa (Sulfonamide Antibiotics) Hives    Sulfamethoxazole-Trimethoprim Unknown (comments)         Tomas Stovall was evaluated through a patient-initiated, synchronous (real-time) audio only encounter. She (or guardian if applicable) is aware that it is a billable service, which includes applicable co-pays, with coverage as determined by her insurance carrier. This visit was conducted with the patient's (and/or Rachael Wooten guardian's) verbal consent. She has not had a related appointment within my department in the past 7 days or scheduled within the next 24 hours. The patient was located in a state where the provider was licensed to provide care.   The patient was located at: [address] [place] [address]  The provider was located at: [address] [place] [address]    Total Time: minutes: 21-30 minutes    Babita Grossman MD

## 2022-12-10 DIAGNOSIS — J06.9 URI WITH COUGH AND CONGESTION: ICD-10-CM

## 2022-12-12 RX ORDER — CHLORPHENIRAMINE MALEATE 4 MG
TABLET ORAL 2 TIMES DAILY
Qty: 45 G | Refills: 1 | Status: SHIPPED | OUTPATIENT
Start: 2022-12-12 | End: 2023-01-11

## 2022-12-12 RX ORDER — FLUTICASONE FUROATE 27.5 MCG
SPRAY, SUSPENSION (ML) NASAL
Qty: 5.9 G | Refills: 12 | Status: SHIPPED | OUTPATIENT
Start: 2022-12-12

## 2022-12-12 RX ORDER — DULOXETIN HYDROCHLORIDE 60 MG/1
CAPSULE, DELAYED RELEASE ORAL
Qty: 60 CAPSULE | Refills: 2 | Status: SHIPPED | OUTPATIENT
Start: 2022-12-12

## 2022-12-22 ENCOUNTER — TELEPHONE (OUTPATIENT)
Dept: FAMILY MEDICINE CLINIC | Age: 57
End: 2022-12-22

## 2022-12-22 ENCOUNTER — VIRTUAL VISIT (OUTPATIENT)
Dept: FAMILY MEDICINE CLINIC | Age: 57
End: 2022-12-22
Payer: MEDICAID

## 2022-12-22 DIAGNOSIS — J45.20 MILD INTERMITTENT ASTHMA WITHOUT COMPLICATION: ICD-10-CM

## 2022-12-22 DIAGNOSIS — J06.9 URI WITH COUGH AND CONGESTION: ICD-10-CM

## 2022-12-22 DIAGNOSIS — Z95.1 S/P CABG X 3: Primary | ICD-10-CM

## 2022-12-22 PROCEDURE — 99443 PR PHYS/QHP TELEPHONE EVALUATION 21-30 MIN: CPT | Performed by: FAMILY MEDICINE

## 2022-12-22 RX ORDER — BENZONATATE 200 MG/1
200 CAPSULE ORAL
Qty: 21 CAPSULE | Refills: 1 | Status: SHIPPED | OUTPATIENT
Start: 2022-12-22 | End: 2022-12-29

## 2022-12-22 RX ORDER — BENZONATATE 200 MG/1
200 CAPSULE ORAL
Qty: 21 CAPSULE | Refills: 1 | Status: SHIPPED | OUTPATIENT
Start: 2022-12-22 | End: 2022-12-22 | Stop reason: SDUPTHER

## 2022-12-22 NOTE — PROGRESS NOTES
Sharee Stovall is a 62 y.o. female evaluated via audio only technology on 12/22/2022. I communicated with the patient and/or health care decision maker about the nature and details of the following:  Assessment & Plan:     Respiratory infection  Probable flu or COVID. Recommend obtaining swab and considering antiviral given extensive comorbidities. She does not feel she can make it to my office. Has a home COVID swab and will call her pharmacy to see if they can do a flu swab. We can treat as appropriate. Am concerned about the shortness of breath. She did get some relief from albuterol so I suggest she try a another puff using her spacer and to use this regularly to provide some relief. If she does not feel the albuterol is helping sufficiently she needs to go to the emergency room. She has a history of quite dramatic side effects from steroids. Blood sugar in the 400s and blood pressure in the 200s and tachycardia. Steroids seem to put her in the hospital more often than not. Rather than empiric steroid to keep her out of the hospital I suggest she be evaluated in the hospital before even considering steroid. 12  Subjective:   Sharee Stovall is a 62 y.o. female who was seen for Flu Like Symptoms (SINCE YESTERDAY. ) and Nausea      Symptoms started yesterday 12/21. Initial symptoms were fairly mild, sore throat, nasal congestion. This morning started having trouble breathing, chest hurts, burns when she breathes, doing a lot of coughing. Nasal congestion and pressure. Getting up and walk to the bathroom she feels winded. Used albuterol first thing this morning and feels a little better but not open in the chest    No fever    Has DayQuil and NyQuil available but has not taken them yet. Is using Flonase but is only getting relief of congestion for about 20 minutes. Has slight nausea but no vomiting. Has a headache.     Has not taken a COVID test.  Last round of COVID was February 2022.    No known exposures    Prior to Admission medications    Medication Sig Start Date End Date Taking? Authorizing Provider   benzonatate (TESSALON) 200 mg capsule Take 1 Capsule by mouth three (3) times daily as needed for Cough for up to 7 days. 12/22/22 12/29/22 Yes Celeste Varghese MD   clotrimazole (LOTRIMIN) 1 % topical cream APPLY TO AFFECTED AREA TWO (2) TIMES A DAY FOR 30 DAYS. 12/12/22 1/11/23 Yes Celeste Varghese MD   Children's Flonase Sensimist 27.5 mcg/actuation nasal spray USE 2 SPRAYS BY NASAL ONCE DAILY 12/12/22  Yes Celeste Varghese MD   DULoxetine (CYMBALTA) 60 mg capsule TAKE 1 CAPSULE BY MOUTH TWICE TIMES A DAY. 12/12/22  Yes Celeste Varghese MD   diazePAM (VALIUM) 5 mg tablet Take 2.5 mg by mouth as needed. 9/24/22  Yes Provider, Historical   albuterol (PROVENTIL HFA, VENTOLIN HFA, PROAIR HFA) 90 mcg/actuation inhaler INHALE 1 PUFF BY MOUTH EVERY 4 HOURS AS NEEDED FOR WHEEZE 11/25/22  Yes Celeste Varghese MD   fluticasone propionate [facility]) 50 mcg/actuation nasal spray SPRAY 2 SPRAYS INTO EACH NOSTRIL EVERY DAY 10/31/22  Yes Celeste Varghese MD   lidocaine (LIDODERM) 5 % APPLY 1 PATCH TOPICALLY ONCE DAILY EVERY 12 HOURS 9/30/22  Yes Celeste Varghese MD   BD Insulin Syringe Ultra-Fine 1 mL 31 gauge x 5/16 syrg USE TO INJECT INSULIN 6 TIMES DAILY 9/30/22  Yes Celeste Varghese MD   gabapentin (NEURONTIN) 300 mg capsule TAKE 3 CAPSULES BY MOUTH THREE TIMES DAILY 7/25/22  Yes Celeste Varghese MD   cholecalciferol (VITAMIN D3) (50,000 UNITS /1250 MCG) capsule TAKE 1 CAPSULE BY MOUTH EVERY SEVEN (7) DAYS. 7/22/22  Yes Celeste Varghese MD   acetaminophen (TYLENOL) 325 mg tablet Take  by mouth every four (4) hours as needed for Pain. Yes Provider, Historical   SENNA-DOCUSATE SODIUM PO Take  by mouth.    Yes Provider, Historical   fenofibrate (LOFIBRA) 160 mg tablet TAKE 1 TABLET BY MOUTH EVERY DAY 4/13/22  Yes Celeste Varghese MD   traMADoL [facility]) 50 mg tablet Take 50 mg by mouth every six (6) hours as needed for Pain. Yes Provider, Historical   OTHER Rolling walker with seat. Standard. 249 pounds 6/25/21  Yes Bernardino Rasheed MD   OTHER Shower chair. Standard. 249 pounds 6/25/21  Yes Bernardino Rasheed MD   nitroglycerin (NITROSTAT) 0.4 mg SL tablet 1 Tab by SubLINGual route every five (5) minutes as needed for Chest Pain. Up to 3 doses. 11/5/20  Yes Bernardino Rasheed MD   metoprolol tartrate (LOPRESSOR) 25 mg tablet Take 25 mg by mouth daily. 8/16/18  Yes Provider, Historical   latanoprost (XALATAN) 0.005 % ophthalmic solution Apply 1 Drop to eye daily. 10/31/19  Yes Provider, Historical   insulin lispro (HUMALOG U-100 INSULIN) 100 unit/mL injection 30 Units by SubCUTAneous route Before breakfast, lunch, and dinner. Patient taking differently: 45 Units by SubCUTAneous route Before breakfast, lunch, dinner and at bedtime. 2/18/19  Yes Bernardino Rasheed MD   Walker [facility] Please dispense a rolling walkerIt M54.5, G89.29, R26.9 10/18/18  Yes Bernardino Rasheed MD   [facility] ULTRA TEST strip  11/5/15  Yes Provider, Historical   LANTUS 100 unit/mL injection 80 Units by SubCUTAneous route two (2) times a day. 11/29/15  Yes Provider, Historical   aspirin 81 mg chewable tablet TAKE 4 TABLETS BY MOUTH EVERY DAY  Patient not taking: Reported on 12/22/2022 11/7/22   Bernardino Rasheed MD   albuterol (PROVENTIL VENTOLIN) 2.5 mg /3 mL (0.083 %) nebu 3 mL by Nebulization route every four (4) hours as needed for Wheezing. Patient not taking: Reported on 12/22/2022 12/2/21   Bernardino Rasheed MD   Linzess 290 mcg cap capsule  11/3/21   Provider, Historical   ondansetron (ZOFRAN ODT) 8 mg disintegrating tablet Take 1 Tab by mouth every eight (8) hours as needed for Nausea. Patient not taking: Reported on 12/22/2022 1/26/21   Bernardino Rasheed MD   Nebulizer & Compressor machine 1 Each by Does Not Apply route every four (4) hours as needed for Wheezing.   Patient not taking: Reported on 12/22/2022 2/17/20   Bernardino Rasheed MD     Allergies Allergen Reactions    Atorvastatin Other (comments)     Violent vomiting      Hydrocortisone Other (comments) and Unknown (comments)     Made wound worse when applied  Made wound worse when applied      Macrobid [Nitrofurantoin Monohyd/M-Cryst] Anaphylaxis    Methocarbamol Other (comments) and Nausea and Vomiting    Bacitracin Other (comments) and Unknown (comments)    Celexa [Citalopram] Other (comments)    Diflucan [Fluconazole] Nausea and Vomiting    Flagyl [Metronidazole] Hives    Lisinopril Other (comments)     Low BP    Metformin Other (comments)    Methylprednisolone Palpitations     Adverse reaction to methylprednisolone 500 mg. Side effects: Severe hypertension, severe hyper glycemia  Tolerates prednisone 40 mg    Metoclopramide Other (comments) and Unknown (comments)    Neomycin-Bacitracnzn-Polymyxnb Unknown (comments)    Neosporin [Benzalkonium Chloride] Other (comments)    Pcn [Penicillins] Hives    Silver Rash     Silver tape    Sulfa (Sulfonamide Antibiotics) Hives    Sulfamethoxazole-Trimethoprim Unknown (comments)         Tomas Stovall was evaluated through a patient-initiated, synchronous (real-time) audio only encounter. She (or guardian if applicable) is aware that it is a billable service, which includes applicable co-pays, with coverage as determined by her insurance carrier. This visit was conducted with the patient's (and/or Jocelyn Beckford guardian's) verbal consent. She has not had a related appointment within my department in the past 7 days or scheduled within the next 24 hours. The patient was located in a state where the provider was licensed to provide care.   The patient was located at: [place]  The provider was located at: [place]    Total Time: minutes: 21-30 minutes    Jennifer Corley MD

## 2022-12-22 NOTE — PROGRESS NOTES
Health Maintenance Due   Topic Date Due    Hepatitis C Screening  Never done    Hepatitis B Vaccine (1 of 3 - Risk 3-dose series) Never done    Shingles Vaccine (1 of 2) Never done    MICROALBUMIN Q1  07/10/2019    Pneumococcal 0-64 years (2 - PCV) 02/01/2020    COVID-19 Vaccine (4 - Booster for Pfizer series) 02/09/2022    Foot Exam Q1  05/26/2022    A1C test (Diabetic or Prediabetic)  07/18/2022    Flu Vaccine (1) 08/01/2022     1. \"Have you been to the ER, urgent care clinic since your last visit? Hospitalized since your last visit? \"  Yes.    2. \"Have you seen or consulted any other health care providers outside of the [address] [place] [address] since your last visit? \"  Yes. [place]      3. For patients aged 39-70: Has the patient had a colonoscopy / FIT/ Cologuard? Yes - Care Gap present. Most recent result on file      If the patient is female:    4. For patients aged 41-77: Has the patient had a mammogram within the past 2 years? Yes - Care Gap present. Most recent result on file      5. For patients aged 21-65: Has the patient had a pap smear? Yes - Care Gap present.  Most recent result on file

## 2022-12-29 ENCOUNTER — TELEPHONE (OUTPATIENT)
Dept: FAMILY MEDICINE CLINIC | Age: 57
End: 2022-12-29

## 2022-12-29 DIAGNOSIS — J45.20 MILD INTERMITTENT ASTHMA WITHOUT COMPLICATION: ICD-10-CM

## 2022-12-29 RX ORDER — NEBULIZER AND COMPRESSOR
1 EACH MISCELLANEOUS
Qty: 1 EACH | Refills: 0 | Status: SHIPPED | OUTPATIENT
Start: 2022-12-29

## 2022-12-29 RX ORDER — CIPROFLOXACIN 500 MG/1
500 TABLET ORAL 2 TIMES DAILY
Qty: 10 TABLET | Refills: 0 | Status: SHIPPED | OUTPATIENT
Start: 2022-12-29 | End: 2023-01-03
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
## 2022-12-29 NOTE — TELEPHONE ENCOUNTER
Returned her call. She has been feeling pretty crummy for the last week. Today is day 9 of her illness. Tired weak coughing. Chest hurts. Has been try to keep up with her fluids. Dry mouth, thickening mucus. Urine is a light yellow. Burns a little bit to urinate. Reminiscent of previous urinary tract infections. Called in Cipro. Albuterol MDI is somewhat helpful but difficult to get a good inhale. Does not currently have a nebulizer but has plenty of nebulizing solution.    [facility] has supply nebulizer in the past so we will send over nebulizer order to see if insurance would be willing to replace her machine

## 2023-01-26 DIAGNOSIS — M54.50 CHRONIC LOW BACK PAIN, UNSPECIFIED BACK PAIN LATERALITY, UNSPECIFIED WHETHER SCIATICA PRESENT: ICD-10-CM

## 2023-01-26 DIAGNOSIS — G89.29 CHRONIC LOW BACK PAIN, UNSPECIFIED BACK PAIN LATERALITY, UNSPECIFIED WHETHER SCIATICA PRESENT: ICD-10-CM

## 2023-01-26 NOTE — TELEPHONE ENCOUNTER
Pt is calling stating her back is hurting bad and she can't get in with the ortho Dr or neurologist until April and she is wanting to know if you could fill her med just enough until she sees you Wednesday.  Pt would like a call when done    Requested Prescriptions     Pending Prescriptions Disp Refills    cyclobenzaprine (FLEXERIL) 5 mg tablet 30 Tablet 3

## 2023-01-27 RX ORDER — CYCLOBENZAPRINE HCL 5 MG
TABLET ORAL
Qty: 30 TABLET | Refills: 3 | Status: SHIPPED | OUTPATIENT
Start: 2023-01-27

## 2023-02-01 ENCOUNTER — OFFICE VISIT (OUTPATIENT)
Dept: FAMILY MEDICINE CLINIC | Age: 58
End: 2023-02-01
Payer: MEDICAID

## 2023-02-01 VITALS
HEIGHT: 65 IN | BODY MASS INDEX: 40.98 KG/M2 | TEMPERATURE: 97.2 F | OXYGEN SATURATION: 96 % | WEIGHT: 246 LBS | RESPIRATION RATE: 17 BRPM | DIASTOLIC BLOOD PRESSURE: 63 MMHG | SYSTOLIC BLOOD PRESSURE: 126 MMHG | HEART RATE: 61 BPM

## 2023-02-01 DIAGNOSIS — M79.7 FIBROMYALGIA: Primary | ICD-10-CM

## 2023-02-01 DIAGNOSIS — N20.0 KIDNEY STONE: ICD-10-CM

## 2023-02-01 DIAGNOSIS — G89.29 CHRONIC LOW BACK PAIN, UNSPECIFIED BACK PAIN LATERALITY, UNSPECIFIED WHETHER SCIATICA PRESENT: ICD-10-CM

## 2023-02-01 DIAGNOSIS — M54.50 CHRONIC LOW BACK PAIN, UNSPECIFIED BACK PAIN LATERALITY, UNSPECIFIED WHETHER SCIATICA PRESENT: ICD-10-CM

## 2023-02-01 RX ORDER — TRAMADOL HYDROCHLORIDE 50 MG/1
50 TABLET ORAL
Qty: 21 TABLET | Refills: 0 | Status: SHIPPED | OUTPATIENT
Start: 2023-02-01 | End: 2023-02-08

## 2023-02-01 RX ORDER — TURMERIC 400 MG
400 CAPSULE ORAL AS NEEDED
COMMUNITY

## 2023-02-01 RX ORDER — NALOXONE HYDROCHLORIDE 4 MG/.1ML
SPRAY NASAL
Qty: 2 EACH | Refills: 0 | Status: SHIPPED | OUTPATIENT
Start: 2023-02-01

## 2023-02-01 RX ORDER — IBUPROFEN 200 MG
1 TABLET ORAL EVERY 24 HOURS
Qty: 30 PATCH | Refills: 0 | Status: SHIPPED | OUTPATIENT
Start: 2023-02-01 | End: 2023-03-03

## 2023-02-01 NOTE — PROGRESS NOTES
Health Maintenance Due   Topic Date Due    Hepatitis C Screening  Never done    Hepatitis B Vaccine (1 of 3 - Risk 3-dose series) Never done    Shingles Vaccine (1 of 2) Never done    Pneumococcal 0-64 years (2 - PCV) 02/01/2020    GFR test (Diabetes, CKD 3-4, OR last GFR 15-59)  06/26/2021    COVID-19 Vaccine (4 - Booster for Pfizer series) 02/09/2022    Foot Exam Q1  05/26/2022    A1C test (Diabetic or Prediabetic)  07/18/2022    Flu Vaccine (1) 08/01/2022    Diabetic Alb to Cr ratio (uACR) test  12/06/2022     1. \"Have you been to the ER, urgent care clinic since your last visit? Hospitalized since your last visit? \"  Yes. [facility] ED    2. \"Have you seen or consulted any other health care providers outside of the [address] [place] [address] since your last visit? \"  Yes. Endocrinology, Ob/Gyn, Neurologist Ophthalmology      3. For patients aged 39-70: Has the patient had a colonoscopy / FIT/ Cologuard? Yes - Care Gap present. Most recent result on file      If the patient is female:    4. For patients aged 41-77: Has the patient had a mammogram within the past 2 years? Yes - Care Gap present. Most recent result on file      5. For patients aged 21-65: Has the patient had a pap smear? Yes - Care Gap present.  Most recent result on file

## 2023-02-01 NOTE — PROGRESS NOTES
PARIS Stovall is a 62 y.o. female who presents to follow-up on recent emergency room visit. She is having back pain and blood on urination. Recent urinations she has heard clink's in the toilet bowl and looked down to find small black flakes. She has been to the emergency room and has had CT negative for kidney stone. She might be describing a urate stone    She is also having significant back pain. Has been essentially laid up for the last several months with respiratory illnesses. She is scooting around her apartment seated on her walker doing chores, pushes her self to exhaustion in about 10 minutes and then needs to lie down for an hour. Her back vertically her right side is described as excruciating pain. This is also why she has been to the emergency room and a work-up so far has been unrevealing. On exam today she has myofascial tenderness in the midline around T8 and throughout the lumbar most prominent L5 and S1. As myofascial pain of the superior trapezius bilateral rhomboid bilateral erector spine a bilateral quadratus lumborum bilateral gluteus, bilateral quadriceps. Interestingly her arms are not involved    PMHx:  Past Medical History:   Diagnosis Date    Diabetes ([address]     Fibula fracture     right    Hip fracture [address]     Myocardial infarct ([facility]        Meds:   Current Outpatient Medications   Medication Sig Dispense Refill    turmeric 400 mg cap Take 400 mg by mouth as needed. traMADoL (ULTRAM) 50 mg tablet Take 1 Tablet by mouth every eight (8) hours as needed for Pain for up to 7 days. Max Daily Amount: 150 mg. 21 Tablet 0    naloxone (NARCAN) 4 mg/actuation nasal spray Use 1 spray intranasally, then discard. Repeat with new spray every 2 min as needed for opioid overdose symptoms, alternating nostrils. 2 Each 0    nicotine (NICODERM CQ) 21 mg/24 hr 1 Patch by TransDERmal route every twenty-four (24) hours for 30 days.  30 Patch 0    cyclobenzaprine (FLEXERIL) 5 mg tablet TAKE 1 TABLET BY MOUTH NIGHTLY AS NEEDED FOR MUSCLE SPASM(S). 30 Tablet 3    Nebulizer & Compressor machine 1 Each by Does Not Apply route every four (4) hours as needed for Wheezing. 1 Each 0    Children's Flonase Sensimist 27.5 mcg/actuation nasal spray USE 2 SPRAYS BY NASAL ONCE DAILY 5.9 g 12    DULoxetine (CYMBALTA) 60 mg capsule TAKE 1 CAPSULE BY MOUTH TWICE TIMES A DAY. 60 Capsule 2    diazePAM (VALIUM) 5 mg tablet Take 2.5 mg by mouth as needed. albuterol (PROVENTIL HFA, VENTOLIN HFA, PROAIR HFA) 90 mcg/actuation inhaler INHALE 1 PUFF BY MOUTH EVERY 4 HOURS AS NEEDED FOR WHEEZE 18 Each 2    aspirin 81 mg chewable tablet TAKE 4 TABLETS BY MOUTH EVERY DAY 90 Tablet 3    fluticasone propionate (FLONASE) 50 mcg/actuation nasal spray SPRAY 2 SPRAYS INTO EACH NOSTRIL EVERY DAY 1 Each 5    lidocaine (LIDODERM) 5 % APPLY 1 PATCH TOPICALLY ONCE DAILY EVERY 12 HOURS 90 Patch 1    BD Insulin Syringe Ultra-Fine 1 mL 31 gauge x 5/16 syrg USE TO INJECT INSULIN 6 TIMES DAILY 300 Each 11    gabapentin (NEURONTIN) 300 mg capsule TAKE 3 CAPSULES BY MOUTH THREE TIMES DAILY 270 Capsule 1    cholecalciferol (VITAMIN D3) (50,000 UNITS /1250 MCG) capsule TAKE 1 CAPSULE BY MOUTH EVERY SEVEN (7) DAYS. 13 Capsule 3    acetaminophen (TYLENOL) 325 mg tablet Take  by mouth every four (4) hours as needed for Pain. SENNA-DOCUSATE SODIUM PO Take  by mouth. fenofibrate (LOFIBRA) 160 mg tablet TAKE 1 TABLET BY MOUTH EVERY DAY 90 Tablet 3    traMADoL (ULTRAM) 50 mg tablet Take 50 mg by mouth every six (6) hours as needed for Pain. OTHER Rolling walker with seat. Standard. 249 pounds 1 Each 0    OTHER Shower chair. Standard. 249 pounds 1 Each 0    ondansetron (ZOFRAN ODT) 8 mg disintegrating tablet Take 1 Tab by mouth every eight (8) hours as needed for Nausea. 30 Tab 2    nitroglycerin (NITROSTAT) 0.4 mg SL tablet 1 Tab by SubLINGual route every five (5) minutes as needed for Chest Pain. Up to 3 doses.  30 Tab 1 metoprolol tartrate (LOPRESSOR) 25 mg tablet Take 25 mg by mouth daily. latanoprost (XALATAN) 0.005 % ophthalmic solution Apply 1 Drop to eye daily. 11    insulin lispro (HUMALOG U-100 INSULIN) 100 unit/mL injection 30 Units by SubCUTAneous route Before breakfast, lunch, and dinner. (Patient taking differently: 45 Units by SubCUTAneous route Before breakfast, lunch, dinner and at bedtime.) 81 mL 3    Walker misc Please dispense a rolling walkerIt M54.5, G89.29, R26.9 1 Each 0    ONETOUCH ULTRA TEST strip       LANTUS 100 unit/mL injection 80 Units by SubCUTAneous route two (2) times a day. albuterol (PROVENTIL VENTOLIN) 2.5 mg /3 mL (0.083 %) nebu 3 mL by Nebulization route every four (4) hours as needed for Wheezing. (Patient not taking: No sig reported) 100 Each 2    Linzess 290 mcg cap capsule  (Patient not taking: No sig reported)         Allergies: Allergies   Allergen Reactions    Atorvastatin Other (comments)     Violent vomiting      Hydrocortisone Other (comments) and Unknown (comments)     Made wound worse when applied  Made wound worse when applied      Macrobid [Nitrofurantoin Monohyd/M-Cryst] Anaphylaxis    Methocarbamol Other (comments) and Nausea and Vomiting    Bacitracin Other (comments) and Unknown (comments)    Celexa [Citalopram] Other (comments)    Diflucan [Fluconazole] Nausea and Vomiting    Flagyl [Metronidazole] Hives    Lisinopril Other (comments)     Low BP    Metformin Other (comments)    Methylprednisolone Palpitations     Adverse reaction to methylprednisolone 500 mg.   Side effects: Severe hypertension, severe hyper glycemia  Tolerates prednisone 40 mg    Metoclopramide Other (comments) and Unknown (comments)    Neomycin-Bacitracnzn-Polymyxnb Unknown (comments)    Neosporin [Benzalkonium Chloride] Other (comments)    Silver Rash     Silver tape    Sulfa (Sulfonamide Antibiotics) Hives    Sulfamethoxazole-Trimethoprim Unknown (comments)       Smoker:  Social History Tobacco Use   Smoking Status Former   Smokeless Tobacco Never       ETOH:   Social History     Substance and Sexual Activity   Alcohol Use No       FH:   Family History   Problem Relation Age of Onset    Cancer Mother     Diabetes Mother     Heart Disease Mother     Heart Disease Father     Diabetes Father        ROS:   As listed in HPI. In addition:  Constitutional:   No headache, fever, fatigue, weight loss or weight gain      Cardiac:    No chest pain      Resp:   No cough or shortness of breath      Neuro   No loss of consciousness, dizziness, seizures      Physical Exam:  Blood pressure 126/63, pulse 61, temperature 97.2 °F (36.2 °C), temperature source Temporal, resp. rate 17, height 5' 5\" (1.651 m), weight 246 lb (111.6 kg), last menstrual period 02/19/2011, SpO2 96 %. GEN: No apparent distress. Alert and oriented and responds to all questions appropriately. NEUROLOGIC:  No focal neurologic deficits. Strength and sensation grossly intact. Coordination and gait grossly intact. EXT: Well perfused. No edema. SKIN: No obvious rashes. See HPI for MSK exam       Assessment and Plan     Kidney stone  Describing a few tiny black stones that could be urate stones. These would not necessarily show up on CT. She is concerned about right flank pain and right CVA pain. Would be reasonable to see urology for this. We are reassured that her last CT at the beginning of January showed no hydronephrosis and her symptoms are not really changed since then. Hematuria  Noted on emergency room urinalysis  Also good reason to see urology  Urine cultures appear to be negative for bacterial growth    Fibromyalgia  She is exquisitely tender to very light palpation throughout her back consistent with fibromyalgia.   For the most part this reproduces her pain complaint and would correlate with a decrease in activity and decrease in quality sleep over the last few months  She is taking Cymbalta  Gabapentin  Has been out of Flexeril but this was recently refilled  Has been taking Valium for the last week  She is requesting tramadol which she has used in the past under the guidance of her pain management neurologist.  I encouraged her to contact her neurologist with this request.  She reports that the earliest available appointment is April  I do not think that tramadol is a good solution for this type of pain but it may be useful for a very brief period. I encouraged her to only use the medicine for 5-7 days at the most to augment her other fibromyalgia medication. This medicine is a tool to get you moving. This medicine cannot help you if you stay stationary. We will provide with a short course but you need to ask your neurologist in the future    Will be prescribed with naltrexone because she is also taking Valium on occasion     She is currently trying to move around but is pushing herself to exhaustion and spending the vast majority of the time recovering. Needs to pace herself and take short frequent breaks rather than push herself to exhaustion and requiring longer breaks    This was a 40-minute visit. Greater than 50% of the time was spent counseling the patient on fibromyalgia. ICD-10-CM ICD-9-CM    1. Fibromyalgia  M79.7 729.1 traMADoL (ULTRAM) 50 mg tablet      2. Chronic low back pain, unspecified back pain laterality, unspecified whether sciatica present  M54.50 724.2     G89.29 338.29       3. Kidney stone  N20.0 592.0 REFERRAL TO UROLOGY          AVS given.  Pt expressed understanding of instructions

## 2023-03-24 DIAGNOSIS — M79.2 NEUROPATHIC PAIN: ICD-10-CM

## 2023-03-24 DIAGNOSIS — J45.20 MILD INTERMITTENT ASTHMA WITHOUT COMPLICATION: ICD-10-CM

## 2023-03-24 RX ORDER — DULOXETIN HYDROCHLORIDE 60 MG/1
CAPSULE, DELAYED RELEASE ORAL
Qty: 60 CAPSULE | Refills: 2 | Status: SHIPPED | OUTPATIENT
Start: 2023-03-24

## 2023-03-24 RX ORDER — LIDOCAINE 50 MG/G
PATCH TOPICAL
Qty: 30 PATCH | Refills: 5 | Status: SHIPPED | OUTPATIENT
Start: 2023-03-24

## 2023-03-24 RX ORDER — ALBUTEROL SULFATE 90 UG/1
AEROSOL, METERED RESPIRATORY (INHALATION)
Qty: 18 EACH | Refills: 2 | Status: SHIPPED | OUTPATIENT
Start: 2023-03-24

## 2023-03-24 RX ORDER — CLOTRIMAZOLE 1 %
CREAM (GRAM) TOPICAL
Qty: 45 G | Refills: 1 | Status: SHIPPED | OUTPATIENT
Start: 2023-03-24

## 2023-03-24 RX ORDER — GUAIFENESIN 100 MG/5ML
LIQUID (ML) ORAL
Qty: 90 TABLET | Refills: 3 | Status: SHIPPED | OUTPATIENT
Start: 2023-03-24

## 2023-03-24 RX ORDER — IBUPROFEN 200 MG
TABLET ORAL
Qty: 28 PATCH | Refills: 3 | Status: SHIPPED | OUTPATIENT
Start: 2023-03-24

## 2023-05-02 LAB — HBA1C MFR BLD HPLC: 8.6 %

## 2023-05-10 NOTE — TELEPHONE ENCOUNTER
-- DO NOT REPLY / DO NOT REPLY ALL --  -- Message is from Engagement Center Operations ([facility]) --    General Patient Message:   Patient returning a missed call and would like a call back.     Caller Information       Type Contact Phone/Fax    05/10/2023 03:17 PM CDT Phone (Incoming) Ilenesk[employer] day (Self) [phone] (H)        Alternative phone number: no    Can a detailed message be left? No    Message Turnaround:     Is it Working Hours? Yes - Working Hours     [place]:    Please give this turnaround time to the caller:   \"This message will be sent to [state Provider's name]. The clinical team will fulfill your request as soon as they review your message.\"                 Refilled with the exception of Plavix which is not a current medication

## 2023-05-11 RX ORDER — DULOXETIN HYDROCHLORIDE 60 MG/1
CAPSULE, DELAYED RELEASE ORAL
Qty: 180 CAPSULE | Refills: 3 | Status: SHIPPED | OUTPATIENT
Start: 2023-05-11

## 2023-05-11 NOTE — TELEPHONE ENCOUNTER
[facility] sent a fax for medication refill request:     DULoxetine (CYMBALTA) 60 MG     180 quantity

## 2023-07-10 RX ORDER — FENOFIBRATE 160 MG/1
TABLET ORAL
Qty: 30 TABLET | Refills: 11 | Status: SHIPPED | OUTPATIENT
Start: 2023-07-10

## 2023-07-18 DIAGNOSIS — J30.1 ALLERGIC RHINITIS DUE TO POLLEN: ICD-10-CM

## 2023-07-18 DIAGNOSIS — J45.20 MILD INTERMITTENT ASTHMA, UNCOMPLICATED: ICD-10-CM

## 2023-07-18 RX ORDER — METHOCARBAMOL 750 MG/1
TABLET ORAL
Qty: 4 CAPSULE | Status: SHIPPED | OUTPATIENT
Start: 2023-07-18

## 2023-07-18 RX ORDER — ALBUTEROL SULFATE 90 UG/1
AEROSOL, METERED RESPIRATORY (INHALATION)
Qty: 18 EACH | Refills: 2 | Status: SHIPPED | OUTPATIENT
Start: 2023-07-18

## 2023-07-18 RX ORDER — FLUTICASONE PROPIONATE 50 MCG
SPRAY, SUSPENSION (ML) NASAL
Qty: 16 G | Refills: 5 | Status: SHIPPED | OUTPATIENT
Start: 2023-07-18

## 2023-07-18 RX ORDER — CLOTRIMAZOLE 1 %
CREAM (GRAM) TOPICAL
Qty: 45 G | Refills: 1 | Status: SHIPPED | OUTPATIENT
Start: 2023-07-18

## 2023-08-18 RX ORDER — NICOTINE 21 MG/24HR
PATCH, TRANSDERMAL 24 HOURS TRANSDERMAL
Qty: 28 PATCH | Refills: 3 | Status: SHIPPED | OUTPATIENT
Start: 2023-08-18

## 2023-09-16 DIAGNOSIS — M54.50 LOW BACK PAIN, UNSPECIFIED: ICD-10-CM

## 2023-09-19 RX ORDER — CYCLOBENZAPRINE HCL 5 MG
TABLET ORAL
Qty: 30 TABLET | Refills: 3 | Status: SHIPPED | OUTPATIENT
Start: 2023-09-19

## 2023-11-22 DIAGNOSIS — E11.8 TYPE 2 DIABETES MELLITUS WITH COMPLICATION, WITH LONG-TERM CURRENT USE OF INSULIN (HCC): Primary | ICD-10-CM

## 2023-11-22 DIAGNOSIS — Z79.4 TYPE 2 DIABETES MELLITUS WITH COMPLICATION, WITH LONG-TERM CURRENT USE OF INSULIN (HCC): Primary | ICD-10-CM

## 2023-11-24 RX ORDER — PEN NEEDLE, DIABETIC 29 G X1/2"
NEEDLE, DISPOSABLE MISCELLANEOUS
Qty: 200 EACH | Refills: 11 | Status: SHIPPED | OUTPATIENT
Start: 2023-11-24

## 2024-02-10 DIAGNOSIS — M54.50 LOW BACK PAIN, UNSPECIFIED: ICD-10-CM

## 2024-02-12 RX ORDER — NICOTINE 21 MG/24HR
PATCH, TRANSDERMAL 24 HOURS TRANSDERMAL
Qty: 28 PATCH | Refills: 3 | Status: SHIPPED | OUTPATIENT
Start: 2024-02-12

## 2024-02-12 RX ORDER — CYCLOBENZAPRINE HCL 5 MG
TABLET ORAL
Qty: 30 TABLET | Refills: 3 | Status: SHIPPED | OUTPATIENT
Start: 2024-02-12

## 2024-03-01 LAB — HBA1C MFR BLD HPLC: 7.5 %

## 2024-03-11 ENCOUNTER — TELEPHONE (OUTPATIENT)
Age: 59
End: 2024-03-11

## 2024-03-11 NOTE — TELEPHONE ENCOUNTER
----- Message from [email or web address] sent at 3/11/2024  4:06 PM EDT -----  Subject: Hospital Follow Up    QUESTIONS  What hospital was the Patient Discharged from? [facility]   Date of Discharge? 2024-03-20  Discharge Location? Home  Reason for hospitalization as patient stated? Myelopathy Spinal Stenosis   of Thoratic region   What question does the patient have, if applicable?   ---------------------------------------------------------------------------  --------------  CALL BACK INFO  What is the best way for the office to contact you? Do not leave any   message, patient will call back for answer  Preferred Call Back Phone Number? [phone]  ---------------------------------------------------------------------------  --------------  SCRIPT ANSWERS  Relationship to Patient? Covered Entity  Covered Entity Type? Hospital?  Representative Name? Kathy   Fall with Harm Risk

## 2024-04-10 ENCOUNTER — OFFICE VISIT (OUTPATIENT)
Age: 59
End: 2024-04-10
Payer: COMMERCIAL

## 2024-04-10 VITALS
DIASTOLIC BLOOD PRESSURE: 68 MMHG | RESPIRATION RATE: 16 BRPM | WEIGHT: 254.6 LBS | TEMPERATURE: 97.8 F | HEIGHT: 65 IN | BODY MASS INDEX: 42.42 KG/M2 | OXYGEN SATURATION: 96 % | SYSTOLIC BLOOD PRESSURE: 135 MMHG | HEART RATE: 76 BPM

## 2024-04-10 DIAGNOSIS — E55.9 VITAMIN D DEFICIENCY: ICD-10-CM

## 2024-04-10 DIAGNOSIS — R33.9 URINARY RETENTION: Primary | ICD-10-CM

## 2024-04-10 DIAGNOSIS — E53.8 B12 DEFICIENCY: ICD-10-CM

## 2024-04-10 DIAGNOSIS — M53.3 SACRAL PAIN: ICD-10-CM

## 2024-04-10 DIAGNOSIS — D64.9 ANEMIA, UNSPECIFIED TYPE: ICD-10-CM

## 2024-04-10 DIAGNOSIS — R30.9 URINARY PAIN: ICD-10-CM

## 2024-04-10 DIAGNOSIS — F50.89 PICA: ICD-10-CM

## 2024-04-10 DIAGNOSIS — E61.1 IRON DEFICIENCY: ICD-10-CM

## 2024-04-10 LAB
BILIRUBIN, URINE, POC: NEGATIVE
BLOOD URINE, POC: NEGATIVE
GLUCOSE URINE, POC: 500
KETONES, URINE, POC: NEGATIVE
LEUKOCYTE ESTERASE, URINE, POC: ABNORMAL
NITRITE, URINE, POC: NEGATIVE
PH, URINE, POC: 6 (ref 4.6–8)
PROTEIN,URINE, POC: ABNORMAL
SPECIFIC GRAVITY, URINE, POC: 1.02 (ref 1–1.03)
URINALYSIS CLARITY, POC: CLEAR
URINALYSIS COLOR, POC: YELLOW
UROBILINOGEN, POC: NORMAL

## 2024-04-10 PROCEDURE — 3078F DIAST BP <80 MM HG: CPT | Performed by: FAMILY MEDICINE

## 2024-04-10 PROCEDURE — PBSHW AMB POC URINALYSIS DIP STICK MANUAL W/O MICRO: Performed by: FAMILY MEDICINE

## 2024-04-10 PROCEDURE — 99214 OFFICE O/P EST MOD 30 MIN: CPT | Performed by: FAMILY MEDICINE

## 2024-04-10 PROCEDURE — 3075F SYST BP GE 130 - 139MM HG: CPT | Performed by: FAMILY MEDICINE

## 2024-04-10 PROCEDURE — 81002 URINALYSIS NONAUTO W/O SCOPE: CPT | Performed by: FAMILY MEDICINE

## 2024-04-10 RX ORDER — FLUTICASONE PROPIONATE 50 MCG
1 SPRAY, SUSPENSION (ML) NASAL DAILY
Qty: 32 G | Refills: 1 | Status: SHIPPED | OUTPATIENT
Start: 2024-04-10

## 2024-04-10 RX ORDER — SENNA AND DOCUSATE SODIUM 50; 8.6 MG/1; MG/1
1 TABLET, FILM COATED ORAL 3 TIMES DAILY
Qty: 90 TABLET | Refills: 3 | Status: SHIPPED | OUTPATIENT
Start: 2024-04-10

## 2024-04-10 RX ORDER — OXYCODONE HYDROCHLORIDE 5 MG/1
5 TABLET ORAL EVERY 8 HOURS PRN
COMMUNITY
Start: 2024-04-09 | End: 2024-04-16

## 2024-04-10 SDOH — ECONOMIC STABILITY: FOOD INSECURITY: WITHIN THE PAST 12 MONTHS, THE FOOD YOU BOUGHT JUST DIDN'T LAST AND YOU DIDN'T HAVE MONEY TO GET MORE.: NEVER TRUE

## 2024-04-10 SDOH — ECONOMIC STABILITY: FOOD INSECURITY: WITHIN THE PAST 12 MONTHS, YOU WORRIED THAT YOUR FOOD WOULD RUN OUT BEFORE YOU GOT MONEY TO BUY MORE.: NEVER TRUE

## 2024-04-10 SDOH — ECONOMIC STABILITY: INCOME INSECURITY: HOW HARD IS IT FOR YOU TO PAY FOR THE VERY BASICS LIKE FOOD, HOUSING, MEDICAL CARE, AND HEATING?: NOT HARD AT ALL

## 2024-04-10 ASSESSMENT — PATIENT HEALTH QUESTIONNAIRE - PHQ9
SUM OF ALL RESPONSES TO PHQ QUESTIONS 1-9: 0
1. LITTLE INTEREST OR PLEASURE IN DOING THINGS: NOT AT ALL
SUM OF ALL RESPONSES TO PHQ9 QUESTIONS 1 & 2: 0
SUM OF ALL RESPONSES TO PHQ QUESTIONS 1-9: 0
2. FEELING DOWN, DEPRESSED OR HOPELESS: NOT AT ALL

## 2024-04-10 NOTE — PROGRESS NOTES
Chief Complaint   Patient presents with    Follow-Up from Hospital    Urinary Retention    Urinary Pain           Health Maintenance Due   Topic Date Due    Hepatitis B vaccine (1 of 3 - 3-dose series) Never done    HIV screen  Never done    Diabetic Alb to Cr ratio (uACR) test  Never done    Hepatitis C screen  Never done    Shingles vaccine (1 of 2) Never done    Pneumococcal 0-64 years Vaccine (2 of 2 - PCV) 02/01/2020    Lipids  06/26/2021    GFR test (Diabetes, CKD 3-4, OR last GFR 15-59)  06/26/2021    Diabetic foot exam  05/26/2022    COVID-19 Vaccine (4 - 2023-24 season) 09/01/2023    Diabetic retinal exam  09/07/2023    Depression Screen  02/01/2024    Breast cancer screen  02/28/2024         \"Have you been to the ER, urgent care clinic since your last visit?  Hospitalized since your last visit?\"    yes    “Have you seen or consulted any other health care providers outside of [facility] since your last visit?”      Neurology, Nephrology,     Have you had a mammogram?”   Yes      Date of last Mammogram: 2/28/2022         
  Medication Sig Dispense Refill    oxyCODONE (ROXICODONE) 5 MG immediate release tablet Take 1 tablet by mouth every 8 hours as needed for Pain.      sennosides-docusate sodium (SENOKOT-S) 8.6-50 MG tablet Take 1 tablet by mouth in the morning, at noon, and at bedtime 90 tablet 3    fluticasone (FLONASE) 50 MCG/ACT nasal spray 1 spray by Each Nostril route daily 32 g 1    cyclobenzaprine (FLEXERIL) 5 MG tablet TAKE 1 TABLET BY MOUTH NIGHTLY AS NEEDED FOR MUSCLE SPASM(S). 30 tablet 3    nicotine (NICODERM CQ) 21 MG/24HR PLACE 1 PATCH BY TRANSDERMAL ROUTE EVERY TWENTY-FOUR (24) HOURS 28 patch 3    BD INSULIN SYRINGE U/F 31G X 5/16\" 1 ML MISC USE TO INJECT INSULIN 6 TIMES DAILY 200 each 11    fluticasone (FLONASE) 50 MCG/ACT nasal spray SPRAY 2 SPRAYS INTO EACH NOSTRIL EVERY DAY 16 g 5    D3-50 1.25 MG (81488 UT) CAPS TAKE 1 CAPSULE BY MOUTH EVERY SEVEN (7) DAYS. 4 capsule PRN    albuterol sulfate HFA (PROVENTIL;VENTOLIN;PROAIR) 108 (90 Base) MCG/ACT inhaler INHALE 1 PUFF BY MOUTH EVERY 4 HOURS AS NEEDED FOR WHEEZE 18 each 2    fenofibrate (TRIGLIDE) 160 MG tablet TAKE 1 TABLET BY MOUTH EVERY DAY 30 tablet 11    DULoxetine (CYMBALTA) 60 MG extended release capsule TAKE 1 CAPSULE BY MOUTH TWICE TIMES A DAY. 180 capsule 3    Misc. Devices (WALKER) MISC Please dispense a rolling walkerIt M54.5, G89.29, R26.9      acetaminophen (TYLENOL) 325 MG tablet Take by mouth every 4 hours as needed      albuterol (PROVENTIL) (2.5 MG/3ML) 0.083% nebulizer solution Inhale 3 mLs into the lungs every 4 hours as needed      aspirin 81 MG chewable tablet TAKE 4 TABLETS BY MOUTH EVERY DAY      diazePAM (VALIUM) 5 MG tablet Take 0.5 tablets by mouth as needed.      fluticasone (FLONASE SENSIMIST) 27.5 MCG/SPRAY nasal spray USE 2 SPRAYS BY NASAL ONCE DAILY      gabapentin (NEURONTIN) 300 MG capsule TAKE 3 CAPSULES BY MOUTH THREE TIMES DAILY      insulin glargine (LANTUS) 100 UNIT/ML injection vial Inject 80 Units into the skin 2 times daily

## 2024-04-11 LAB
25(OH)D3 SERPL-MCNC: 40.5 NG/ML (ref 30–100)
BASOPHILS # BLD: 0 K/UL (ref 0–0.1)
BASOPHILS NFR BLD: 1 % (ref 0–1)
DIFFERENTIAL METHOD BLD: NORMAL
EOSINOPHIL # BLD: 0.4 K/UL (ref 0–0.4)
EOSINOPHIL NFR BLD: 4 % (ref 0–7)
ERYTHROCYTE [DISTWIDTH] IN BLOOD BY AUTOMATED COUNT: 14 % (ref 11.5–14.5)
FERRITIN SERPL-MCNC: 29 NG/ML (ref 8–252)
FOLATE SERPL-MCNC: 11.7 NG/ML (ref 5–21)
HCT VFR BLD AUTO: 37.3 % (ref 35–47)
HGB BLD-MCNC: 11.5 G/DL (ref 11.5–16)
IMM GRANULOCYTES # BLD AUTO: 0 K/UL (ref 0–0.04)
IMM GRANULOCYTES NFR BLD AUTO: 0 % (ref 0–0.5)
IRON SATN MFR SERPL: 7 % (ref 20–50)
IRON SERPL-MCNC: 37 UG/DL (ref 35–150)
LYMPHOCYTES # BLD: 2 K/UL (ref 0.8–3.5)
LYMPHOCYTES NFR BLD: 24 % (ref 12–49)
MCH RBC QN AUTO: 27.6 PG (ref 26–34)
MCHC RBC AUTO-ENTMCNC: 30.8 G/DL (ref 30–36.5)
MCV RBC AUTO: 89.7 FL (ref 80–99)
MONOCYTES # BLD: 0.6 K/UL (ref 0–1)
MONOCYTES NFR BLD: 8 % (ref 5–13)
NEUTS SEG # BLD: 5.4 K/UL (ref 1.8–8)
NEUTS SEG NFR BLD: 63 % (ref 32–75)
NRBC # BLD: 0 K/UL (ref 0–0.01)
NRBC BLD-RTO: 0 PER 100 WBC
PLATELET # BLD AUTO: 400 K/UL (ref 150–400)
PMV BLD AUTO: 10.2 FL (ref 8.9–12.9)
RBC # BLD AUTO: 4.16 M/UL (ref 3.8–5.2)
TIBC SERPL-MCNC: 565 UG/DL (ref 250–450)
VIT B12 SERPL-MCNC: 362 PG/ML (ref 193–986)
WBC # BLD AUTO: 8.4 K/UL (ref 3.6–11)

## 2024-04-12 ENCOUNTER — TELEPHONE (OUTPATIENT)
Age: 59
End: 2024-04-12

## 2024-04-12 NOTE — TELEPHONE ENCOUNTER
Labs reviewed.    Iron is relatively low and blood counts have recovered nicely after her surgery.  Looks like she used up all of her iron making new blood cells.  The low iron could be the reason for her ice cravings.  It is not so low that she needs an infusion.    She should consider increasing dietary sources of iron or taking a over-the-counter iron supplement (ferrous sulfate 325 (65 mg elemental iron)).  Take this with largest meal the day (usually dinner) because iron is best absorbed when taken with vegetables.    Preliminary results of urine culture suggest that she could have a UTI.  Do not have susceptibilities yet.  We may be offering her an antibiotic on Monday

## 2024-04-13 LAB
BACTERIA SPEC CULT: ABNORMAL
BACTERIA SPEC CULT: ABNORMAL
CC UR VC: ABNORMAL
SERVICE CMNT-IMP: ABNORMAL

## 2024-05-02 ENCOUNTER — TELEPHONE (OUTPATIENT)
Age: 59
End: 2024-05-02

## 2024-05-02 DIAGNOSIS — G89.29 OTHER CHRONIC PAIN: Primary | ICD-10-CM

## 2024-05-02 NOTE — TELEPHONE ENCOUNTER
Rogelio López with [facility] is calling to see if they could get an order to extend her home PT order x 2 weeks starting next week. Rogelio also asks that if the extension is approved, that it please be faxed to them because patients insurance needs to see the actual order before they will cover it.         Phone#  [phone]    Fax#   [phone]

## 2024-07-03 DIAGNOSIS — Z79.4 TYPE 2 DIABETES MELLITUS WITH COMPLICATION, WITH LONG-TERM CURRENT USE OF INSULIN (HCC): Primary | ICD-10-CM

## 2024-07-03 DIAGNOSIS — E11.8 TYPE 2 DIABETES MELLITUS WITH COMPLICATION, WITH LONG-TERM CURRENT USE OF INSULIN (HCC): Primary | ICD-10-CM

## 2024-07-03 RX ORDER — CIPROFLOXACIN 250 MG/1
250 TABLET, FILM COATED ORAL 2 TIMES DAILY
Qty: 6 TABLET | Refills: 0 | Status: SHIPPED | OUTPATIENT
Start: 2024-07-03 | End: 2024-07-06

## 2024-07-03 NOTE — TELEPHONE ENCOUNTER
Called in ciprofloxacin for UTI.    This is tricky though, she is allergic to several antibiotics.  It would be best if she were able to leave urine sample that could be sent for culture prior to starting the antibiotic.  For timing purposes that would best be accomplished in urgent care

## 2024-07-03 NOTE — TELEPHONE ENCOUNTER
verified. Informed pt of message from provider regarding medication and urine. Pt verified understanding and will try to go to an urgent care as well. Pt had no further questions.

## 2024-07-03 NOTE — TELEPHONE ENCOUNTER
Pt is calling to inform she has another UTI    Pt stated she is having a burning sensation when she urinates. Pt is requesting an antibiotic.        Pt is also requesting a refill on    Free Style Alma 2 sensors    [facility] on [facility]    Pt stated she is in between Endo providers. Pt is seeing her new Endo provider on 08/19 Dr Mares

## 2024-07-08 ENCOUNTER — TELEPHONE (OUTPATIENT)
Age: 59
End: 2024-07-08

## 2024-07-08 NOTE — TELEPHONE ENCOUNTER
Approved  PA Detail   Prior authorization approved Case ID: [id number]      Payer: [facility]   Your PA request has been approved. Additional information will be provided in the approval communication. (Message 1145)   Approval Details    Authorized from July 5, 2024 to July 5, 2025      Electronic appeal: Not supported   Prior auth initiated by: [facility] [address] [place] [address] [phone] -  [phone]   View History      Notes     Time User Attachment    Attachment received from payer. 7/5/2024  1:10 PM Arash, Leatha Outgoing Prescription Prior Authorization Response Document      Medication Being Authorized     Continuous Glucose Sensor (FREESTYLE ISRRAEL 2 SENSOR) [facility]

## 2024-07-16 RX ORDER — NICOTINE 21 MG/24HR
PATCH, TRANSDERMAL 24 HOURS TRANSDERMAL
Qty: 28 PATCH | Refills: 0 | Status: SHIPPED | OUTPATIENT
Start: 2024-07-16

## 2024-07-31 RX ORDER — FENOFIBRATE 160 MG/1
160 TABLET ORAL DAILY
Qty: 90 TABLET | Refills: 3 | Status: SHIPPED | OUTPATIENT
Start: 2024-07-31

## 2024-08-19 RX ORDER — CHOLECALCIFEROL (VITAMIN D3) 1250 MCG
1 CAPSULE ORAL WEEKLY
Qty: 13 CAPSULE | Refills: 3 | Status: SHIPPED | OUTPATIENT
Start: 2024-08-19

## 2024-08-19 RX ORDER — DOCUSATE SODIUM, SENNOSIDES 50; 8.6 MG/1; MG/1
TABLET ORAL
Qty: 90 TABLET | Refills: 3 | Status: SHIPPED | OUTPATIENT
Start: 2024-08-19

## 2024-08-23 ENCOUNTER — COMMUNITY OUTREACH (OUTPATIENT)
Age: 59
End: 2024-08-23

## 2024-09-10 RX ORDER — PIOGLITAZONEHYDROCHLORIDE 15 MG/1
15 TABLET ORAL DAILY
Qty: 90 TABLET | Refills: 3 | Status: SHIPPED | OUTPATIENT
Start: 2024-09-10

## 2024-09-10 RX ORDER — PIOGLITAZONEHYDROCHLORIDE 15 MG/1
15 TABLET ORAL DAILY
COMMUNITY
End: 2024-09-10 | Stop reason: SDUPTHER

## 2024-10-04 ENCOUNTER — TELEPHONE (OUTPATIENT)
Age: 59
End: 2024-10-04

## 2024-10-04 DIAGNOSIS — M54.16 LUMBAR RADICULOPATHY: ICD-10-CM

## 2024-10-04 DIAGNOSIS — R26.9 GAIT ABNORMALITY: Primary | ICD-10-CM

## 2024-10-04 NOTE — TELEPHONE ENCOUNTER
DME Request  Received: Today   Call patient  Janet Cardoza [facility] [address]  Phone Number: [phone]     Elbert Fuentes and Staff,    This patient is requesting orders for DME products: a shower chair and a walker    These orders can be sent to [facility] Phone: [phone], Fax: [phone]  if an order has not already been placed    Thanks,  Janet PA, Nataliya with [employer] - a partner of [facility] and [facility]

## 2024-10-04 NOTE — TELEPHONE ENCOUNTER
Need more information.  Does she want a walker with 4 prongs, walker with front wheels, or walker with 4 wheels and a seat

## 2024-10-08 ENCOUNTER — TELEPHONE (OUTPATIENT)
Age: 59
End: 2024-10-08

## 2024-10-08 DIAGNOSIS — E11.8 TYPE 2 DIABETES MELLITUS WITH COMPLICATION, WITH LONG-TERM CURRENT USE OF INSULIN (HCC): Primary | ICD-10-CM

## 2024-10-08 DIAGNOSIS — Z79.4 TYPE 2 DIABETES MELLITUS WITH COMPLICATION, WITH LONG-TERM CURRENT USE OF INSULIN (HCC): Primary | ICD-10-CM

## 2024-10-08 NOTE — TELEPHONE ENCOUNTER
Janet Cardoza [facility] [address]  Phone Number: [phone]     Dr. AMADA Fuentes and Staff,    This patient is requesting a prescription for the Freestyle Alma 2 *[place]* is sent to her local [facility]    *This patient does have fingerstick blood glucose testing supplies at home for the time being    Thanks,  Nataliya Wheeler - a partner of [facility] and [facility]

## 2024-10-09 ENCOUNTER — TELEPHONE (OUTPATIENT)
Age: 59
End: 2024-10-09

## 2024-10-09 NOTE — TELEPHONE ENCOUNTER
Pt is calling back stating she told them she needed a wheel chair not a walker. She needs order sent in for wheel chair

## 2024-10-09 NOTE — TELEPHONE ENCOUNTER
Pt is calling to inform the insurance will need a PA for the rx Continuous Glucose  (FREESTYLE ISRRAEL 2 READER) [facility]

## 2024-10-10 ENCOUNTER — TELEPHONE (OUTPATIENT)
Age: 59
End: 2024-10-10

## 2024-10-10 NOTE — TELEPHONE ENCOUNTER
Pt is calling in ref to med and wheelchair wanting to speak with nurse about this. States med was suppose to have a PA and pharmacy is telling her it hasn't been done yet. States they are telling her they never received the order for the wheelchair. Waiting for a call today or first thing in the morning

## 2024-10-11 ENCOUNTER — CLINICAL DOCUMENTATION (OUTPATIENT)
Age: 59
End: 2024-10-11

## 2024-10-11 NOTE — PROGRESS NOTES
CAMERON LEONOR     Key: [id number]    Status Sent to Plan today    Drug FreeStyle Alma 2 [place] device    Form [facility] Electronic PA Form

## 2024-10-14 ENCOUNTER — OFFICE VISIT (OUTPATIENT)
Age: 59
End: 2024-10-14
Payer: COMMERCIAL

## 2024-10-14 ENCOUNTER — TELEPHONE (OUTPATIENT)
Age: 59
End: 2024-10-14

## 2024-10-14 VITALS
OXYGEN SATURATION: 92 % | BODY MASS INDEX: 44.28 KG/M2 | DIASTOLIC BLOOD PRESSURE: 70 MMHG | WEIGHT: 265.8 LBS | RESPIRATION RATE: 16 BRPM | HEIGHT: 65 IN | HEART RATE: 76 BPM | SYSTOLIC BLOOD PRESSURE: 119 MMHG

## 2024-10-14 DIAGNOSIS — Z79.4 TYPE 2 DIABETES MELLITUS WITH COMPLICATION, WITH LONG-TERM CURRENT USE OF INSULIN (HCC): ICD-10-CM

## 2024-10-14 DIAGNOSIS — E55.9 VITAMIN D DEFICIENCY: ICD-10-CM

## 2024-10-14 DIAGNOSIS — E61.1 IRON DEFICIENCY: ICD-10-CM

## 2024-10-14 DIAGNOSIS — R06.09 DOE (DYSPNEA ON EXERTION): Primary | ICD-10-CM

## 2024-10-14 DIAGNOSIS — R26.9 GAIT ABNORMALITY: ICD-10-CM

## 2024-10-14 DIAGNOSIS — E11.8 TYPE 2 DIABETES MELLITUS WITH COMPLICATION, WITH LONG-TERM CURRENT USE OF INSULIN (HCC): ICD-10-CM

## 2024-10-14 DIAGNOSIS — R60.0 LOWER EXTREMITY EDEMA: ICD-10-CM

## 2024-10-14 DIAGNOSIS — G89.29 OTHER CHRONIC PAIN: ICD-10-CM

## 2024-10-14 DIAGNOSIS — M54.16 LUMBAR RADICULOPATHY: ICD-10-CM

## 2024-10-14 DIAGNOSIS — E53.8 B12 DEFICIENCY: ICD-10-CM

## 2024-10-14 LAB
BILIRUBIN, URINE, POC: NORMAL
BLOOD URINE, POC: NEGATIVE
GLUCOSE URINE, POC: 100
KETONES, URINE, POC: NORMAL
LEUKOCYTE ESTERASE, URINE, POC: NEGATIVE
NITRITE, URINE, POC: NEGATIVE
PH, URINE, POC: 6.5 (ref 4.6–8)
PROTEIN,URINE, POC: 300
SPECIFIC GRAVITY, URINE, POC: 1.03 (ref 1–1.03)
URINALYSIS CLARITY, POC: NORMAL
URINALYSIS COLOR, POC: NORMAL
UROBILINOGEN, POC: NORMAL MG/DL

## 2024-10-14 PROCEDURE — 3078F DIAST BP <80 MM HG: CPT | Performed by: FAMILY MEDICINE

## 2024-10-14 PROCEDURE — 99215 OFFICE O/P EST HI 40 MIN: CPT | Performed by: FAMILY MEDICINE

## 2024-10-14 PROCEDURE — 3074F SYST BP LT 130 MM HG: CPT | Performed by: FAMILY MEDICINE

## 2024-10-14 PROCEDURE — PBSHW AMB POC URINALYSIS DIP STICK AUTO W/ MICRO: Performed by: FAMILY MEDICINE

## 2024-10-14 PROCEDURE — 81001 URINALYSIS AUTO W/SCOPE: CPT | Performed by: FAMILY MEDICINE

## 2024-10-14 RX ORDER — FUROSEMIDE 20 MG
20 TABLET ORAL 2 TIMES DAILY
Qty: 180 TABLET | Refills: 1 | Status: SHIPPED | OUTPATIENT
Start: 2024-10-14

## 2024-10-14 SDOH — ECONOMIC STABILITY: FOOD INSECURITY: WITHIN THE PAST 12 MONTHS, YOU WORRIED THAT YOUR FOOD WOULD RUN OUT BEFORE YOU GOT MONEY TO BUY MORE.: NEVER TRUE

## 2024-10-14 SDOH — ECONOMIC STABILITY: FOOD INSECURITY: WITHIN THE PAST 12 MONTHS, THE FOOD YOU BOUGHT JUST DIDN'T LAST AND YOU DIDN'T HAVE MONEY TO GET MORE.: NEVER TRUE

## 2024-10-14 SDOH — ECONOMIC STABILITY: INCOME INSECURITY: HOW HARD IS IT FOR YOU TO PAY FOR THE VERY BASICS LIKE FOOD, HOUSING, MEDICAL CARE, AND HEATING?: NOT HARD AT ALL

## 2024-10-14 ASSESSMENT — PATIENT HEALTH QUESTIONNAIRE - PHQ9
SUM OF ALL RESPONSES TO PHQ QUESTIONS 1-9: 2
SUM OF ALL RESPONSES TO PHQ QUESTIONS 1-9: 2
2. FEELING DOWN, DEPRESSED OR HOPELESS: SEVERAL DAYS
SUM OF ALL RESPONSES TO PHQ QUESTIONS 1-9: 2
SUM OF ALL RESPONSES TO PHQ QUESTIONS 1-9: 2
SUM OF ALL RESPONSES TO PHQ9 QUESTIONS 1 & 2: 2
1. LITTLE INTEREST OR PLEASURE IN DOING THINGS: SEVERAL DAYS

## 2024-10-14 NOTE — TELEPHONE ENCOUNTER
CAMERON GALVEZ (Key: [id number])  PA Case ID #: [id number]    Status  Sent to Plan today  Next Steps  The plan will fax you a determination, typically within 1 to 5 business days.  Drug  FreeStyle Alma 2 [place] device    Form  [place] Electronic PA Form (2017 NCPDP)

## 2024-10-14 NOTE — PROGRESS NOTES
Chief Complaint   Patient presents with    Follow-Up from Hospital            Pt having both leg swelling   Pt states she has been SOB, but is getting better since hospital stay but still there   Pt has questions about amlodipine 10mg (has not started taking yet)     Health Maintenance Due   Topic Date Due    HIV screen  Never done    Diabetic Alb to Cr ratio (uACR) test  Never done    Hepatitis C screen  Never done    Hepatitis B vaccine (1 of 3 - 19+ 3-dose series) Never done    Shingles vaccine (1 of 2) Never done    Pneumococcal 0-64 years Vaccine (2 of 2 - PCV) 02/01/2020    Lipids  06/26/2021    GFR test (Diabetes, CKD 3-4, OR last GFR 15-59)  06/26/2021    Diabetic foot exam  05/26/2022    Diabetic retinal exam  09/07/2023    Breast cancer screen  02/28/2024    Colorectal Cancer Screen  07/22/2024    Flu vaccine (1) 08/01/2024    COVID-19 Vaccine (4 - 2023-24 season) 09/01/2024         \"Have you been to the ER, urgent care clinic since your last visit?  Hospitalized since your last visit?\"    Yes, [facility] , then [facility]     “Have you seen or consulted any other health care providers outside of [facility] since your last visit?”    Yes, [place], neurologist Dr Chele moreno     “Have you had a colorectal cancer screening such as a colonoscopy/FIT/Cologuard?    Yes     Date of last Colonoscopy: 7/22/2021  No cologuard on file  No FIT/FOBT on file   No flexible sigmoidoscopy on file        Have you had a mammogram?”   Yes 2022    Date of last Mammogram: 2/28/2022

## 2024-10-14 NOTE — TELEPHONE ENCOUNTER
Janet Cardoza [facility] [address]  Phone Number: [phone]     Dr. Fuentes & Staff,    This patient is inquiring if her DME Orders for a Wheelchair and Shower Chair have been sent to [facility] (or an alternative company).    I have informed the patient that you guys are working on this.      Thanks,  Janet PA, [facility], a partner of [facility] and [employer] [place]

## 2024-10-14 NOTE — TELEPHONE ENCOUNTER
I suspect that the glucometer and the wheelchair/shower chair required a \"face-to-face\" which would be satisfied by my note from today

## 2024-10-14 NOTE — PROGRESS NOTES
CONOR Cummings is a 59 y.o. female who presents to follow-up recent hospital stay at [facility] 9/16 - 9/22 for back surgery    Presented with progressively worsening pain in low back and legs with findings of moderate stenosis L3-L5, spondylolisthesis L4-5 and disc herniation with severe bilateral foraminal stenosis L5-S1.    Underwent L3-S1 TLIF.    Blood sugars were labile requiring the help of hospitalist.  Prior to admission insulin was 70 units twice daily.  Was getting low sugar in the hospital so this was reduced to 60 units twice daily.    Polanco removed postop day 1 but had to be replaced due to urinary retention.  She did pass a voiding trial postop day 5..    Due to pain and body habitus she was slow to mobilize.  Was sent to rehab    Reports that she had a rough rehab experience.  She felt like she was getting too much insulin.  Recalls getting Lantus 70 units twice daily and is unsure how much lispro she was getting.  She was getting hypoglycemia.  Evidently on her own outside the hospital she will only take 40-50 units of Lantus and about 10 units of lispro daily which is very different than the way it is written on prescription.    She also reports that she was on oxygen upon leaving the hospital and remained on oxygen in the rehab facility.  Was told that she could not go home with oxygen.  Describes getting home and feeling really short of breath.  Says that it is getting better in the weeks since getting out of the hospital but that she is still struggling to make it around the house.  She has about a 10 yard walk to get to her bathroom.  She is barely able to make that walk before getting out of breath.  On the way back from the bathroom she needs to stop and sit on her bed before making it back to the living room.    She is currently making this walk with the help of a walker because her wheelchair will not make it through the doorway into her bedroom.  She is currently using a wheelchair for

## 2024-10-15 ENCOUNTER — TELEPHONE (OUTPATIENT)
Age: 59
End: 2024-10-15

## 2024-10-15 DIAGNOSIS — R06.02 SOB (SHORTNESS OF BREATH): Primary | ICD-10-CM

## 2024-10-15 DIAGNOSIS — R06.09 DOE (DYSPNEA ON EXERTION): ICD-10-CM

## 2024-10-15 LAB
25(OH)D3 SERPL-MCNC: 41.8 NG/ML (ref 30–100)
ALBUMIN SERPL-MCNC: 3.2 G/DL (ref 3.5–5)
ALBUMIN/GLOB SERPL: 1 (ref 1.1–2.2)
ALP SERPL-CCNC: 195 U/L (ref 45–117)
ALT SERPL-CCNC: 14 U/L (ref 12–78)
ANION GAP SERPL CALC-SCNC: 5 MMOL/L (ref 2–12)
AST SERPL-CCNC: 15 U/L (ref 15–37)
BASOPHILS # BLD: 0 K/UL (ref 0–0.1)
BASOPHILS NFR BLD: 1 % (ref 0–1)
BILIRUB SERPL-MCNC: 0.5 MG/DL (ref 0.2–1)
BUN SERPL-MCNC: 13 MG/DL (ref 6–20)
BUN/CREAT SERPL: 15 (ref 12–20)
CALCIUM SERPL-MCNC: 8.9 MG/DL (ref 8.5–10.1)
CHLORIDE SERPL-SCNC: 105 MMOL/L (ref 97–108)
CHOLEST SERPL-MCNC: 159 MG/DL
CO2 SERPL-SCNC: 31 MMOL/L (ref 21–32)
COMMENT:: NORMAL
CREAT SERPL-MCNC: 0.84 MG/DL (ref 0.55–1.02)
DIFFERENTIAL METHOD BLD: ABNORMAL
EOSINOPHIL # BLD: 0.3 K/UL (ref 0–0.4)
EOSINOPHIL NFR BLD: 5 % (ref 0–7)
ERYTHROCYTE [DISTWIDTH] IN BLOOD BY AUTOMATED COUNT: 15.2 % (ref 11.5–14.5)
FERRITIN SERPL-MCNC: 63 NG/ML (ref 8–252)
FOLATE SERPL-MCNC: 14.8 NG/ML (ref 5–21)
GLOBULIN SER CALC-MCNC: 3.1 G/DL (ref 2–4)
GLUCOSE SERPL-MCNC: 238 MG/DL (ref 65–100)
HCT VFR BLD AUTO: 37 % (ref 35–47)
HDLC SERPL-MCNC: 42 MG/DL
HDLC SERPL: 3.8 (ref 0–5)
HGB BLD-MCNC: 11.1 G/DL (ref 11.5–16)
IMM GRANULOCYTES # BLD AUTO: 0 K/UL (ref 0–0.04)
IMM GRANULOCYTES NFR BLD AUTO: 0 % (ref 0–0.5)
IRON SATN MFR SERPL: 10 % (ref 20–50)
IRON SERPL-MCNC: 44 UG/DL (ref 35–150)
LDLC SERPL CALC-MCNC: 69 MG/DL (ref 0–100)
LYMPHOCYTES # BLD: 1.4 K/UL (ref 0.8–3.5)
LYMPHOCYTES NFR BLD: 21 % (ref 12–49)
MCH RBC QN AUTO: 29.2 PG (ref 26–34)
MCHC RBC AUTO-ENTMCNC: 30 G/DL (ref 30–36.5)
MCV RBC AUTO: 97.4 FL (ref 80–99)
MONOCYTES # BLD: 0.6 K/UL (ref 0–1)
MONOCYTES NFR BLD: 8 % (ref 5–13)
NEUTS SEG # BLD: 4.4 K/UL (ref 1.8–8)
NEUTS SEG NFR BLD: 65 % (ref 32–75)
NRBC # BLD: 0 K/UL (ref 0–0.01)
NRBC BLD-RTO: 0 PER 100 WBC
NT PRO BNP: 797 PG/ML
PLATELET # BLD AUTO: 312 K/UL (ref 150–400)
PMV BLD AUTO: 10 FL (ref 8.9–12.9)
POTASSIUM SERPL-SCNC: 4.4 MMOL/L (ref 3.5–5.1)
PROT SERPL-MCNC: 6.3 G/DL (ref 6.4–8.2)
RBC # BLD AUTO: 3.8 M/UL (ref 3.8–5.2)
SODIUM SERPL-SCNC: 141 MMOL/L (ref 136–145)
SPECIMEN HOLD: NORMAL
TIBC SERPL-MCNC: 434 UG/DL (ref 250–450)
TRIGL SERPL-MCNC: 240 MG/DL
VIT B12 SERPL-MCNC: 515 PG/ML (ref 193–986)
VLDLC SERPL CALC-MCNC: 48 MG/DL
WBC # BLD AUTO: 6.7 K/UL (ref 3.6–11)

## 2024-10-15 NOTE — TELEPHONE ENCOUNTER
CAMERON GALVEZ (Key: [id number])  PA Case ID #: [id number]  Need Help? Call us at [phone]  Outcome  Approved today by [facility]PDP 2017  Your PA request has been approved. Additional information will be provided in the approval communication. (Message 1142)  Authorization Expiration Date: 11/11/2024  Drug  FreeStyle Alma 2 [place] device    Form  [place] Electronic PA Form (2017 NCPDP)

## 2024-10-15 NOTE — TELEPHONE ENCOUNTER
Pt called worried about her lab results that she saw in her my chart. Informed we would send message to provider and once labs are resulted we would give her a call back.

## 2024-10-15 NOTE — TELEPHONE ENCOUNTER
verified. Informed pt of message from provider regarding lab results. Pt verified understanding, gave patient scheduling team number to get lung scan done. Informed pt PA for freestyle reader device was approved. Pt asked about shower chair and wheelchair. Informed chart notes were sent today with forms and the [employer] company should be reaching out to her.

## 2024-10-15 NOTE — TELEPHONE ENCOUNTER
Labs reviewed.    Nothing in lab work would explain her shortness of breath    Iron levels okay, blood counts are stable    BNP does not rule in or out heart failure but I think it makes it less likely.      I would recommend a lung scan looking for other sources of shortness of breath

## 2024-10-15 NOTE — TELEPHONE ENCOUNTER
CAMERON GALVEZ (Key: [id number])  PA Case ID #: [id number]  Need Help? Call us at [phone]  Outcome  [facility]  Your PA request has been approved. Additional information will be provided in the approval communication. (Message 114)  Authorization Expiration Date: 11/11/2024  Drug  FreeStyle Alma 2 [place] device    Form  [place] Electronic PA Form (2017 NCPDP)

## 2024-10-16 ENCOUNTER — TELEPHONE (OUTPATIENT)
Age: 59
End: 2024-10-16

## 2024-10-16 NOTE — TELEPHONE ENCOUNTER
Called the # provider by pt.     Faxed order to [phone].      Ph # to central scheduling is [phone] and select opt 2. But [facility] should reach out to pt to schedule.

## 2024-10-21 ENCOUNTER — TELEPHONE (OUTPATIENT)
Age: 59
End: 2024-10-21

## 2024-11-08 ENCOUNTER — TELEPHONE (OUTPATIENT)
Age: 59
End: 2024-11-08

## 2024-11-08 NOTE — TELEPHONE ENCOUNTER
CT chest first received from [facility] dated 11/7/2024.  I ordered this looking for other sources of shortness of breath.    Chest CT is stable.  No concerning findings

## 2024-12-07 DIAGNOSIS — Z79.4 TYPE 2 DIABETES MELLITUS WITH COMPLICATION, WITH LONG-TERM CURRENT USE OF INSULIN (HCC): ICD-10-CM

## 2024-12-07 DIAGNOSIS — E11.8 TYPE 2 DIABETES MELLITUS WITH COMPLICATION, WITH LONG-TERM CURRENT USE OF INSULIN (HCC): ICD-10-CM

## 2024-12-09 RX ORDER — PEN NEEDLE, DIABETIC 29 G X1/2"
NEEDLE, DISPOSABLE MISCELLANEOUS
Qty: 200 EACH | Refills: 3 | Status: SHIPPED | OUTPATIENT
Start: 2024-12-09

## 2025-01-14 SDOH — ECONOMIC STABILITY: INCOME INSECURITY: IN THE LAST 12 MONTHS, WAS THERE A TIME WHEN YOU WERE NOT ABLE TO PAY THE MORTGAGE OR RENT ON TIME?: NO

## 2025-01-14 SDOH — ECONOMIC STABILITY: TRANSPORTATION INSECURITY
IN THE PAST 12 MONTHS, HAS LACK OF TRANSPORTATION KEPT YOU FROM MEETINGS, WORK, OR FROM GETTING THINGS NEEDED FOR DAILY LIVING?: PATIENT DECLINED

## 2025-01-14 SDOH — ECONOMIC STABILITY: FOOD INSECURITY: WITHIN THE PAST 12 MONTHS, THE FOOD YOU BOUGHT JUST DIDN'T LAST AND YOU DIDN'T HAVE MONEY TO GET MORE.: NEVER TRUE

## 2025-01-14 SDOH — ECONOMIC STABILITY: TRANSPORTATION INSECURITY
IN THE PAST 12 MONTHS, HAS THE LACK OF TRANSPORTATION KEPT YOU FROM MEDICAL APPOINTMENTS OR FROM GETTING MEDICATIONS?: YES

## 2025-01-14 SDOH — ECONOMIC STABILITY: FOOD INSECURITY: WITHIN THE PAST 12 MONTHS, YOU WORRIED THAT YOUR FOOD WOULD RUN OUT BEFORE YOU GOT MONEY TO BUY MORE.: NEVER TRUE

## 2025-01-16 ENCOUNTER — TELEMEDICINE (OUTPATIENT)
Age: 60
End: 2025-01-16
Payer: COMMERCIAL

## 2025-01-16 ENCOUNTER — TELEPHONE (OUTPATIENT)
Age: 60
End: 2025-01-16

## 2025-01-16 DIAGNOSIS — J45.20 MILD INTERMITTENT ASTHMA, UNCOMPLICATED: ICD-10-CM

## 2025-01-16 DIAGNOSIS — R22.2 ABDOMINAL WALL LUMP: Primary | ICD-10-CM

## 2025-01-16 DIAGNOSIS — M54.50 LOW BACK PAIN, UNSPECIFIED BACK PAIN LATERALITY, UNSPECIFIED CHRONICITY, UNSPECIFIED WHETHER SCIATICA PRESENT: ICD-10-CM

## 2025-01-16 DIAGNOSIS — M25.559 HIP PAIN, UNSPECIFIED LATERALITY: ICD-10-CM

## 2025-01-16 PROCEDURE — 99213 OFFICE O/P EST LOW 20 MIN: CPT | Performed by: FAMILY MEDICINE

## 2025-01-16 RX ORDER — ALBUTEROL SULFATE 0.83 MG/ML
2.5 SOLUTION RESPIRATORY (INHALATION) EVERY 4 HOURS PRN
Qty: 120 EACH | Refills: 3 | Status: SHIPPED | OUTPATIENT
Start: 2025-01-16

## 2025-01-16 RX ORDER — ALBUTEROL SULFATE 90 UG/1
1 INHALANT RESPIRATORY (INHALATION) EVERY 4 HOURS PRN
Qty: 18 EACH | Refills: 2 | Status: SHIPPED | OUTPATIENT
Start: 2025-01-16

## 2025-01-16 RX ORDER — BUMETANIDE 2 MG/1
2 TABLET ORAL DAILY
COMMUNITY

## 2025-01-16 ASSESSMENT — PATIENT HEALTH QUESTIONNAIRE - PHQ9
1. LITTLE INTEREST OR PLEASURE IN DOING THINGS: NOT AT ALL
SUM OF ALL RESPONSES TO PHQ QUESTIONS 1-9: 0
SUM OF ALL RESPONSES TO PHQ9 QUESTIONS 1 & 2: 0
2. FEELING DOWN, DEPRESSED OR HOPELESS: NOT AT ALL

## 2025-01-16 NOTE — TELEPHONE ENCOUNTER
Patient had a virtual visit with provider today (1/16/25) has a few more questions for provider or nurse if possible.  [phone].

## 2025-01-16 NOTE — TELEPHONE ENCOUNTER
Returned her call.  Evidently her plan had been to go to urgent care but when she called to make an appointment was told that she would be best served in the emergency room where they had an ultrasound capability for the lump in her abdomen.  I see their point.  She does feel like the lump under the skin in her abdomen is growing over the last day.  This makes potentially time sensitive and should not let this go through the weekend.  After some discussion her plan is to proceed to the emergency room

## 2025-01-16 NOTE — PROGRESS NOTES
THIS VISIT WAS COMPLETED VIRTUALLY USING [facility] Visit    HPI  Luke Cummings is a 59 y.o. female who presents with a concern of about a lump under her pannus, right hip pain and back pain.    Starts the story back in February 2024.  After a surgery at this time she noticed a knot in her groin that was small and she did not pay too much attention to it.  Fast forwarded to her surgery September 2024 and she was trying to wash especially good under her pannus and she noticed the knot again.  Did not mention whether it had changed very much.  Then last week she was trying to come off of her opiate.  Has been taking the opiate for 3 years and has been feeling fatigued and wondered if she really needed this medicine anymore.  Decided to stop taking it after a wean down.  1 day in the morning took her medicine and decided not take anymore if she did not have any pain.  Rest of the day she did not need the opiate.  The next day she did not need the opiate and then the day after around midday she started to hurt in the area of her pannus so she decided to take an opiate.  She also felt a reddened area and felt a burning itchy discomfort in the area of a tennis ball sized knot.  When she pokes that area it seems to hurt in the hip and the back with a similar itchy burn.    PMHx:  Past Medical History:   Diagnosis Date    Diabetes ([facility])     Fibula fracture     right    Hip fracture ([facility])     Myocardial infarct ([facility])        Meds:   Current Outpatient Medications   Medication Sig Dispense Refill    bumetanide (BUMEX) 2 MG tablet Take 1 tablet by mouth daily      albuterol sulfate HFA (PROVENTIL;VENTOLIN;PROAIR) 108 (90 Base) MCG/ACT inhaler Inhale 1 puff into the lungs every 4 hours as needed for Wheezing 18 each 2    albuterol (PROVENTIL) (2.5 MG/3ML) 0.083% nebulizer solution Take 3 mLs by nebulization every 4 hours as needed for Wheezing 120 each 3    BD INSULIN SYRINGE U/F 31G X 5/16\" 1 ML MISC USE 1 SYRINGE SIX TIMES

## 2025-01-16 NOTE — PROGRESS NOTES
Luke Cummings is a 59 y.o. female    Chief Complaint   Patient presents with    Abdominal Pain     Symptoms started about 9/16/24 after surgery. Patient states she has a small cyst lower right abdomen area.      Hip Pain    Back Pain    Nausea         Health Maintenance Due   Topic Date Due    HIV screen  Never done    Diabetic Alb to Cr ratio (uACR) test  Never done    Hepatitis C screen  Never done    Hepatitis B vaccine (1 of 3 - 19+ 3-dose series) Never done    Shingles vaccine (1 of 2) Never done    Pneumococcal 0-64 years Vaccine (2 of 2 - PCV) 02/01/2020    Diabetic foot exam  05/26/2022    Diabetic retinal exam  09/07/2023    Breast cancer screen  02/28/2024    Colorectal Cancer Screen  07/22/2024    Flu vaccine (1) 08/01/2024    COVID-19 Vaccine (4 - 2023-24 season) 09/01/2024         \"Have you been to the ER, urgent care clinic since your last visit?  Hospitalized since your last visit?\"    NO    “Have you seen or consulted any other health care providers outside of [facility] since your last visit?”    NO    “Have you had a colorectal cancer screening such as a colonoscopy/FIT/Cologuard?    NO    Date of last Colonoscopy: 7/22/2021  No cologuard on file  No FIT/FOBT on file   No flexible sigmoidoscopy on file        Have you had a mammogram?”   NO    Date of last Mammogram: 2/28/2022

## 2025-01-17 ENCOUNTER — TELEPHONE (OUTPATIENT)
Age: 60
End: 2025-01-17

## 2025-01-17 RX ORDER — BENZONATATE 200 MG/1
200 CAPSULE ORAL 3 TIMES DAILY PRN
Qty: 30 CAPSULE | Refills: 0 | Status: SHIPPED | OUTPATIENT
Start: 2025-01-17 | End: 2025-01-27

## 2025-01-17 NOTE — TELEPHONE ENCOUNTER
Pt is requesting an rx for a sore throat, cough, congestion, low grade fever and no vomiting    [facility] [address]

## 2025-01-17 NOTE — TELEPHONE ENCOUNTER
verified.    Notified of Rx.  Informed pt to go to ED for further eval.  Understanding vocalized.

## 2025-01-17 NOTE — TELEPHONE ENCOUNTER
I can send in a cough medicine.  Highly likely this is a virus and does not require an antibiotic.  She had plans to go to the emergency room today for other reasons.  Should have them listen to her lungs as well

## 2025-01-29 ENCOUNTER — TELEPHONE (OUTPATIENT)
Age: 60
End: 2025-01-29

## 2025-01-29 NOTE — TELEPHONE ENCOUNTER
Pt is calling to inform she is in [facility] with the flu and pneumonia    No call back needed

## 2025-02-04 ENCOUNTER — TELEPHONE (OUTPATIENT)
Age: 60
End: 2025-02-04

## 2025-02-04 NOTE — TELEPHONE ENCOUNTER
Message to patient  Cardiology in the hospital was concerned the chest pain could be due to inflammation of the lining around the heart.  This can happen when you get sick.  Colchicine is intended to help calm down the inflammation around the heart and can eventually help with chest pain.    Heart uses a lot of oxygen.  Maintain your oxygen level is important.  Goal oxygen level greater than 90% so that your heart does not overwork itself.  If your heart is forced to work with too little oxygen it could be damaged in a way similar to a heart attack

## 2025-02-04 NOTE — TELEPHONE ENCOUNTER
The patient's At Home Physical Therapist, Jaja, called our office to let us know of some concerning items she has witnessed at her recent visit.     Patient was discharged from hospital on 1/31/25 on oxygen, with Physical therapy still 2x weekly. When Jaja arrived, she noticed patient was not using oxygen. Oxygen stats are in range until exertion. Patient doesn't have a way to continue oxygen when leaving the house, when she needs it most.     She also states the patient has decided to change her medications to her own liking, she is restarting metoprolol instead of switching to recommendation that hospital said. Not taking Colchine like recommended, but is taking Bumex again. Only taking Lantis once daily instead of the prescribed 2x daily.     Patient is planning to see cardiologist on 7th, driving herself with no oxygen. Nurse is worried that patient should not drive on her own without oxygen since it is going under limits on exertion.    Call back number for nurse once we have more information:    Jaja- At Home- [phone]

## 2025-02-04 NOTE — TELEPHONE ENCOUNTER
Reviewed discharge summary.  Presented with cough congestion vomiting diarrhea fevers and associated chest pain.  Needed to begin oxygen in the emergency room.  Troponin was elevated, CRP was elevated cardiology consulted.  There was a concern for myocarditis/pericarditis so she was given colchicine.

## 2025-02-06 ENCOUNTER — TELEPHONE (OUTPATIENT)
Age: 60
End: 2025-02-06

## 2025-02-06 NOTE — TELEPHONE ENCOUNTER
Jaja from at home health called with concerns of pt having a 4 lb weight gain in 2 days.  Pt is also coughing up red/dark brown sputum.    Jaja Harcourt  [phone]

## 2025-02-06 NOTE — TELEPHONE ENCOUNTER
This is a bit concerning.  Judging from her last echocardiogram in November she should not have heart failure, so if your heart is backing up fluid that would be a new problem.    Any new chest pain or worsening shortness of breath?    Are you wearing your oxygen?  If your oxygen level drops your heart will have trouble keeping up    Are you taking colchicine?    New or worsening symptoms might want to consider going back to the emergency room.  If she feels fine can see how her weight does over the next few days

## 2025-02-06 NOTE — TELEPHONE ENCOUNTER
Jaja states she was diagnosed with heart failure in hospital and she is using her oxygen.  She will advise pt to go to Emergency room but not sure pt will. She does have her appt with cardiology tomorrow.

## 2025-02-11 ENCOUNTER — OFFICE VISIT (OUTPATIENT)
Age: 60
End: 2025-02-11
Payer: COMMERCIAL

## 2025-02-11 VITALS
BODY MASS INDEX: 42.78 KG/M2 | HEIGHT: 65 IN | TEMPERATURE: 97.5 F | WEIGHT: 256.8 LBS | OXYGEN SATURATION: 94 % | HEART RATE: 54 BPM | DIASTOLIC BLOOD PRESSURE: 73 MMHG | RESPIRATION RATE: 19 BRPM | SYSTOLIC BLOOD PRESSURE: 135 MMHG

## 2025-02-11 DIAGNOSIS — R09.02 HYPOXEMIA: Primary | ICD-10-CM

## 2025-02-11 DIAGNOSIS — I10 ESSENTIAL HYPERTENSION: ICD-10-CM

## 2025-02-11 DIAGNOSIS — E11.8 TYPE 2 DIABETES MELLITUS WITH COMPLICATION, WITH LONG-TERM CURRENT USE OF INSULIN (HCC): ICD-10-CM

## 2025-02-11 DIAGNOSIS — G47.33 OSA (OBSTRUCTIVE SLEEP APNEA): ICD-10-CM

## 2025-02-11 DIAGNOSIS — Z79.4 TYPE 2 DIABETES MELLITUS WITH COMPLICATION, WITH LONG-TERM CURRENT USE OF INSULIN (HCC): ICD-10-CM

## 2025-02-11 DIAGNOSIS — R22.2 ABDOMINAL WALL LUMP: ICD-10-CM

## 2025-02-11 DIAGNOSIS — I25.10 CORONARY ARTERY DISEASE INVOLVING NATIVE HEART WITHOUT ANGINA PECTORIS, UNSPECIFIED VESSEL OR LESION TYPE: ICD-10-CM

## 2025-02-11 PROCEDURE — 99214 OFFICE O/P EST MOD 30 MIN: CPT | Performed by: FAMILY MEDICINE

## 2025-02-11 PROCEDURE — 3078F DIAST BP <80 MM HG: CPT | Performed by: FAMILY MEDICINE

## 2025-02-11 PROCEDURE — 3075F SYST BP GE 130 - 139MM HG: CPT | Performed by: FAMILY MEDICINE

## 2025-02-11 NOTE — PATIENT INSTRUCTIONS
[facility]  [address], [address]  [place] [address] [phone]      [facility]monology [facility]  [address], [address]  [place] [address] [phone]

## 2025-02-11 NOTE — PROGRESS NOTES
Health Maintenance Due   Topic Date Due    HIV screen  Never done    Diabetic Alb to Cr ratio (uACR) test  Never done    Hepatitis C screen  Never done    Hepatitis B vaccine (1 of 3 - 19+ 3-dose series) Never done    Shingles vaccine (1 of 2) Never done    Pneumococcal 50+ years Vaccine (2 of 2 - PCV) 02/01/2020    Diabetic foot exam  05/26/2022    Diabetic retinal exam  09/07/2023    Breast cancer screen  02/28/2024    Colorectal Cancer Screen  07/22/2024    Flu vaccine (1) 08/01/2024    COVID-19 Vaccine (4 - 2024-25 season) 09/01/2024    A1C test (Diabetic or Prediabetic)  03/01/2025       
day  Her diabetes control requires that she checks her glucose more than 4 times per day  She is a good candidate for continuous glucometer.  Is aware of how to use the device and is motivated to use the device.      Luke Cummings was evaluated today and a DME order was entered for a glucometer and diabetic testing supplies because she requires this for treatment of diabetes mellitus.  The patient has been sufficiently trained to use the device and she has skills to be able to use this device as intended.  This patient is  requiring the use of insulin for diabetic control.  The patient will require testing  4 or more  times per day to achieve optimal diabetic control.  Patient has Not applicable.  The need for this equipment was discussed with the patient and she understands and is in agreement.        ICD-10-CM    1. Hypoxemia  R09.02       2. YAW (obstructive sleep apnea)  G47.33 External Referral To Pulmonology      3. Type 2 diabetes mellitus with complication, with long-term current use of insulin ([facility])  E11.8 Continuous Glucose Sensor (FREESTYLE ISRRAEL 2 SENSOR) [facility]    Z79.4       4. Abdominal wall lump  R22.2 US ABDOMEN LIMITED      5. Coronary artery disease involving native heart without angina pectoris, unspecified vessel or lesion type  I25.10       6. Essential hypertension  I10             AVS given. Pt expressed understanding of instructions

## 2025-02-12 ENCOUNTER — TELEPHONE (OUTPATIENT)
Age: 60
End: 2025-02-12

## 2025-02-12 DIAGNOSIS — Z79.4 TYPE 2 DIABETES MELLITUS WITH COMPLICATION, WITH LONG-TERM CURRENT USE OF INSULIN (HCC): Primary | ICD-10-CM

## 2025-02-12 DIAGNOSIS — E11.8 TYPE 2 DIABETES MELLITUS WITH COMPLICATION, WITH LONG-TERM CURRENT USE OF INSULIN (HCC): Primary | ICD-10-CM

## 2025-02-12 RX ORDER — PROCHLORPERAZINE 25 MG/1
SUPPOSITORY RECTAL
Qty: 1 EACH | Refills: 0 | Status: SHIPPED | OUTPATIENT
Start: 2025-02-12

## 2025-02-12 RX ORDER — PROCHLORPERAZINE 25 MG/1
SUPPOSITORY RECTAL
Qty: 9 EACH | Refills: 3 | Status: SHIPPED | OUTPATIENT
Start: 2025-02-12

## 2025-02-12 NOTE — TELEPHONE ENCOUNTER
Pt is calling stating the med you were going to do for her Free Style Librea 2 her ins won't do her  told her they are going with the Dexcom 6. She wanted to save some time for you.    Wanting   Dexcom 6

## 2025-02-14 ENCOUNTER — TELEPHONE (OUTPATIENT)
Age: 60
End: 2025-02-14

## 2025-02-14 DIAGNOSIS — G47.33 OSA (OBSTRUCTIVE SLEEP APNEA): Primary | ICD-10-CM

## 2025-02-14 NOTE — TELEPHONE ENCOUNTER
Jaja with [facility] wanted to update provider patient cancelled her appointment today (2/14/25) because she is not feeling well.  [phone]

## 2025-02-17 ENCOUNTER — TELEPHONE (OUTPATIENT)
Age: 60
End: 2025-02-17

## 2025-02-17 DIAGNOSIS — R26.9 GAIT ABNORMALITY: ICD-10-CM

## 2025-02-17 DIAGNOSIS — R53.81 DEBILITY: Primary | ICD-10-CM

## 2025-02-17 NOTE — TELEPHONE ENCOUNTER
Jaja, physical therapist from [facility] wanted to notify provider patient is discharging today. Patient would like to continue care at [facility] in [place], [place]. She couldn't provide fax number.  [phone].

## 2025-02-28 ENCOUNTER — HOSPITAL ENCOUNTER (OUTPATIENT)
Facility: HOSPITAL | Age: 60
Discharge: HOME | End: 2025-02-28
Payer: COMMERCIAL

## 2025-02-28 DIAGNOSIS — R22.2 ABDOMINAL WALL LUMP: ICD-10-CM

## 2025-02-28 PROCEDURE — 76705 ECHO EXAM OF ABDOMEN: CPT

## 2025-03-04 RX ORDER — AMOXICILLIN 250 MG
1 CAPSULE ORAL DAILY
Qty: 90 TABLET | Refills: 3 | Status: SHIPPED | OUTPATIENT
Start: 2025-03-04

## 2025-03-04 RX ORDER — DULOXETIN HYDROCHLORIDE 60 MG/1
CAPSULE, DELAYED RELEASE ORAL
Qty: 180 CAPSULE | Refills: 3 | Status: SHIPPED | OUTPATIENT
Start: 2025-03-04

## 2025-03-07 ENCOUNTER — TELEPHONE (OUTPATIENT)
Age: 60
End: 2025-03-07

## 2025-03-07 NOTE — TELEPHONE ENCOUNTER
Ultrasound of the abdominal wall lump reviewed.    No fluid collection or mass noted.  This is reassuring that it is not a concerning mass.  Therefore this is probably a collection of adipose tissue such as a lipoma or a bruise.  This is managed by avoiding local irritation

## 2025-03-10 ENCOUNTER — PATIENT MESSAGE (OUTPATIENT)
Age: 60
End: 2025-03-10

## 2025-03-10 DIAGNOSIS — R30.0 DYSURIA: Primary | ICD-10-CM

## 2025-03-10 DIAGNOSIS — R30.0 DYSURIA: ICD-10-CM

## 2025-03-11 RX ORDER — CIPROFLOXACIN 500 MG/1
500 TABLET, FILM COATED ORAL 2 TIMES DAILY
Qty: 10 TABLET | Refills: 0 | Status: SHIPPED | OUTPATIENT
Start: 2025-03-11 | End: 2025-03-16

## 2025-03-11 NOTE — TELEPHONE ENCOUNTER
Good Morning,  I can't seem to find the [facility] to send the order to.  Do you have more information?    Thanks,  Glenna  
Negative

## 2025-03-27 DIAGNOSIS — J30.1 ALLERGIC RHINITIS DUE TO POLLEN: ICD-10-CM

## 2025-03-28 DIAGNOSIS — E11.8 TYPE 2 DIABETES MELLITUS WITH COMPLICATION, WITH LONG-TERM CURRENT USE OF INSULIN (HCC): Primary | ICD-10-CM

## 2025-03-28 DIAGNOSIS — Z79.4 TYPE 2 DIABETES MELLITUS WITH COMPLICATION, WITH LONG-TERM CURRENT USE OF INSULIN (HCC): Primary | ICD-10-CM

## 2025-03-28 RX ORDER — INSULIN GLARGINE 100 [IU]/ML
60 INJECTION, SOLUTION SUBCUTANEOUS DAILY
Qty: 20 ML | Refills: 1 | Status: SHIPPED | OUTPATIENT
Start: 2025-03-28

## 2025-03-28 RX ORDER — FLUTICASONE PROPIONATE 50 MCG
2 SPRAY, SUSPENSION (ML) NASAL DAILY
Qty: 16 G | Refills: 5 | Status: SHIPPED | OUTPATIENT
Start: 2025-03-28

## 2025-04-02 ENCOUNTER — TELEPHONE (OUTPATIENT)
Age: 60
End: 2025-04-02

## 2025-04-10 ENCOUNTER — TELEMEDICINE (OUTPATIENT)
Age: 60
End: 2025-04-10
Payer: COMMERCIAL

## 2025-04-10 DIAGNOSIS — L30.4 INTERTRIGO: Primary | ICD-10-CM

## 2025-04-10 PROCEDURE — 99213 OFFICE O/P EST LOW 20 MIN: CPT | Performed by: FAMILY MEDICINE

## 2025-04-10 RX ORDER — OXYCODONE AND ACETAMINOPHEN 5; 325 MG/1; MG/1
2 TABLET ORAL DAILY
COMMUNITY
Start: 2025-04-02

## 2025-04-10 RX ORDER — CLOTRIMAZOLE 1 %
CREAM (GRAM) TOPICAL
Qty: 113 G | Refills: 11 | Status: SHIPPED | OUTPATIENT
Start: 2025-04-10 | End: 2025-04-17

## 2025-04-10 NOTE — PROGRESS NOTES
THIS VISIT WAS COMPLETED VIRTUALLY USING [employer] Virtual Visit    HPI  Luke Cummings is a 59 y.o. female who presents with intertrigo that has been going on for a couple weeks.  Noticed this as a raw irritation that has turned into a cherry red rash on labia, under the fold of the pannus and under the breasts    PMHx:  Past Medical History:   Diagnosis Date    Diabetes (HCC)     Fibula fracture     right    Hip fracture (HCC)     Myocardial infarct (HCC)        Meds:   Current Outpatient Medications   Medication Sig Dispense Refill    oxyCODONE-acetaminophen (PERCOCET) 5-325 MG per tablet Take 2 tablets by mouth daily.      clotrimazole (LOTRIMIN AF) 1 % cream Apply topically 2 times daily. 113 g 11    fluticasone (FLONASE) 50 MCG/ACT nasal spray 2 sprays by Each Nostril route daily 16 g 5    insulin glargine (LANTUS) 100 UNIT/ML injection vial Inject 60 Units into the skin daily 60-70 units once a day 20 mL 1    DULoxetine (CYMBALTA) 60 MG extended release capsule TAKE 1 CAPSULE BY MOUTH TWICE TIMES A DAY. 180 capsule 3    senna-docusate (EQ SENNA-S) 8.6-50 MG per tablet Take 1 tablet by mouth daily 90 tablet 3    Continuous Glucose Sensor (DEXCOM G6 SENSOR) MISC Use to check blood sugar continuously 9 each 3    Continuous Glucose  (DEXCOM G6 ) ISELA Use to check blood sugar continuously 1 each 0    Continuous Glucose Sensor (FREESTYLE ISRRAEL 2 SENSOR) MISC Used to check blood glucose continuously 6 each 3    bumetanide (BUMEX) 2 MG tablet Take 1 tablet by mouth daily      albuterol sulfate HFA (PROVENTIL;VENTOLIN;PROAIR) 108 (90 Base) MCG/ACT inhaler Inhale 1 puff into the lungs every 4 hours as needed for Wheezing 18 each 2    albuterol (PROVENTIL) (2.5 MG/3ML) 0.083% nebulizer solution Take 3 mLs by nebulization every 4 hours as needed for Wheezing 120 each 3    BD INSULIN SYRINGE U/F 31G X 5/16\" 1 ML MISC USE 1 SYRINGE SIX TIMES DAILY 200 each 3    Ferrous Sulfate (IRON PO) Take by mouth daily

## 2025-04-10 NOTE — PROGRESS NOTES
Health Maintenance Due   Topic Date Due    HIV screen  Never done    Diabetic Alb to Cr ratio (uACR) test  Never done    Hepatitis C screen  Never done    Hepatitis B vaccine (1 of 3 - 19+ 3-dose series) Never done    Shingles vaccine (1 of 2) Never done    Pneumococcal 50+ years Vaccine (2 of 2 - PCV) 02/01/2020    Diabetic foot exam  05/26/2022    Diabetic retinal exam  09/07/2023    Breast cancer screen  02/28/2024    Colorectal Cancer Screen  07/22/2024    COVID-19 Vaccine (4 - 2024-25 season) 09/01/2024    A1C test (Diabetic or Prediabetic)  03/01/2025

## 2025-04-14 DIAGNOSIS — E11.8 TYPE 2 DIABETES MELLITUS WITH COMPLICATION, WITH LONG-TERM CURRENT USE OF INSULIN (HCC): Primary | ICD-10-CM

## 2025-04-14 DIAGNOSIS — Z79.4 TYPE 2 DIABETES MELLITUS WITH COMPLICATION, WITH LONG-TERM CURRENT USE OF INSULIN (HCC): Primary | ICD-10-CM

## 2025-04-14 RX ORDER — PROCHLORPERAZINE 25 MG/1
SUPPOSITORY RECTAL
Qty: 9 EACH | Refills: 3 | Status: SHIPPED | OUTPATIENT
Start: 2025-04-14

## 2025-04-14 RX ORDER — PROCHLORPERAZINE 25 MG/1
SUPPOSITORY RECTAL
Qty: 1 EACH | Refills: 0 | Status: SHIPPED | OUTPATIENT
Start: 2025-04-14

## 2025-04-17 DIAGNOSIS — Z79.4 TYPE 2 DIABETES MELLITUS WITH DIABETIC NEUROPATHY, WITH LONG-TERM CURRENT USE OF INSULIN (HCC): ICD-10-CM

## 2025-04-17 DIAGNOSIS — E11.8 TYPE 2 DIABETES MELLITUS WITH COMPLICATION, WITH LONG-TERM CURRENT USE OF INSULIN (HCC): Primary | ICD-10-CM

## 2025-04-17 DIAGNOSIS — E11.21 TYPE 2 DIABETES WITH NEPHROPATHY (HCC): ICD-10-CM

## 2025-04-17 DIAGNOSIS — E11.40 TYPE 2 DIABETES MELLITUS WITH DIABETIC NEUROPATHY, WITH LONG-TERM CURRENT USE OF INSULIN (HCC): ICD-10-CM

## 2025-04-17 DIAGNOSIS — Z79.4 TYPE 2 DIABETES MELLITUS WITH COMPLICATION, WITH LONG-TERM CURRENT USE OF INSULIN (HCC): Primary | ICD-10-CM

## 2025-04-17 RX ORDER — PROCHLORPERAZINE 25 MG/1
SUPPOSITORY RECTAL
Qty: 1 EACH | Refills: 3 | Status: SHIPPED | OUTPATIENT
Start: 2025-04-17

## 2025-04-23 DIAGNOSIS — E11.8 TYPE 2 DIABETES MELLITUS WITH COMPLICATION, WITH LONG-TERM CURRENT USE OF INSULIN (HCC): ICD-10-CM

## 2025-04-23 DIAGNOSIS — Z79.4 TYPE 2 DIABETES MELLITUS WITH COMPLICATION, WITH LONG-TERM CURRENT USE OF INSULIN (HCC): ICD-10-CM

## 2025-04-23 RX ORDER — INSULIN GLARGINE 100 [IU]/ML
60 INJECTION, SOLUTION SUBCUTANEOUS DAILY
Qty: 20 ML | Refills: 1 | Status: SHIPPED | OUTPATIENT
Start: 2025-04-23

## 2025-05-13 ENCOUNTER — TELEMEDICINE (OUTPATIENT)
Age: 60
End: 2025-05-13
Payer: COMMERCIAL

## 2025-05-13 DIAGNOSIS — Z91.030 BEE ALLERGY STATUS: Primary | ICD-10-CM

## 2025-05-13 DIAGNOSIS — K12.0 CANKER SORE: ICD-10-CM

## 2025-05-13 PROCEDURE — 99213 OFFICE O/P EST LOW 20 MIN: CPT | Performed by: FAMILY MEDICINE

## 2025-05-13 RX ORDER — EPINEPHRINE 0.3 MG/.3ML
0.3 INJECTION SUBCUTANEOUS ONCE
Qty: 2 EACH | Refills: 2 | Status: SHIPPED | OUTPATIENT
Start: 2025-05-13 | End: 2025-05-13

## 2025-05-13 NOTE — PROGRESS NOTES
THIS VISIT WAS COMPLETED VIRTUALLY USING [employer] Virtual Visit    HPI  Luke Cummings is a 59 y.o. female who presents follow-up recent emergency room visit.  She was shaving her upper lip when she took a chunk out of her lip and it would not stop bleeding for several hours.  Went to the emergency room and received a clot promoting bandage which worked after about an hour.  Is fully healed up at this point.    Has a canker sore that is been bothering her for 3 weeks.  Mother mentioned that she used to have canker sores that were medication induced.  Patient is curious about whether any medicine could be causing this.  She has not changed any medicine recently.    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  Had a bee come into her apartment earlier this week and wants to make sure she has her EpiPen in the house    PMHx:  Past Medical History:   Diagnosis Date    Diabetes (HCC)     Fibula fracture     right    Hip fracture ([facility])     Myocardial infarct ([facility])        Meds:   Current Outpatient Medications   Medication Sig Dispense Refill    EPINEPHrine (EPIPEN 2-EUSEBIO) 0.3 MG/0.3ML SOAJ injection Inject 0.3 mLs into the muscle once for 1 dose Use as directed for allergic reaction 2 each 2    insulin glargine (LANTUS) 100 UNIT/ML injection vial Inject 60 Units into the skin daily 60-70 units once a day 20 mL 1    Continuous Glucose Transmitter (DEXCOM G6 TRANSMITTER) MISC Use transmitter with Dexcom G6 system.  Replace every 3 months 1 each 3    Continuous Glucose Sensor (DEXCOM G6 SENSOR) MISC Use to check blood sugar continuously 9 each 3    oxyCODONE-acetaminophen (PERCOCET) 5-325 MG per tablet Take 2 tablets by mouth daily.      fluticasone (FLONASE) 50 MCG/ACT nasal spray 2 sprays by Each Nostril route daily 16 g 5    DULoxetine (CYMBALTA) 60 MG extended release capsule TAKE 1 CAPSULE BY MOUTH TWICE TIMES A DAY. 180 capsule 3    senna-docusate (EQ SENNA-S) 8.6-50 MG per tablet Take 1

## 2025-05-16 RX ORDER — POLYETHYLENE GLYCOL 3350 17 G/17G
17 POWDER, FOR SOLUTION ORAL DAILY
Qty: 1530 G | Refills: 1 | Status: SHIPPED | OUTPATIENT
Start: 2025-05-16 | End: 2025-06-15

## 2025-05-29 DIAGNOSIS — R30.0 DYSURIA: Primary | ICD-10-CM

## 2025-06-03 ENCOUNTER — PATIENT MESSAGE (OUTPATIENT)
Age: 60
End: 2025-06-03

## 2025-06-05 RX ORDER — AZITHROMYCIN 250 MG/1
TABLET, FILM COATED ORAL
Qty: 6 TABLET | Refills: 0 | Status: SHIPPED | OUTPATIENT
Start: 2025-06-05 | End: 2025-06-15

## 2025-06-10 RX ORDER — FEXOFENADINE HCL 180 MG/1
180 TABLET ORAL DAILY
Qty: 90 TABLET | Refills: 3 | Status: SHIPPED | OUTPATIENT
Start: 2025-06-10

## 2025-07-28 LAB — HBA1C MFR BLD HPLC: 8.1 %

## 2025-08-13 ENCOUNTER — TELEPHONE (OUTPATIENT)
Age: 60
End: 2025-08-13

## 2025-08-13 DIAGNOSIS — M54.9 BACK PAIN, UNSPECIFIED BACK LOCATION, UNSPECIFIED BACK PAIN LATERALITY, UNSPECIFIED CHRONICITY: ICD-10-CM

## 2025-08-13 DIAGNOSIS — R26.9 GAIT ABNORMALITY: Primary | ICD-10-CM

## 2025-08-21 DIAGNOSIS — R30.0 DYSURIA: Primary | ICD-10-CM

## 2025-08-21 DIAGNOSIS — R30.0 DYSURIA: ICD-10-CM

## 2025-08-27 RX ORDER — FENOFIBRATE 160 MG/1
160 TABLET ORAL DAILY
Qty: 90 TABLET | Refills: 3 | Status: SHIPPED | OUTPATIENT
Start: 2025-08-27

## 2025-08-27 RX ORDER — CHOLECALCIFEROL (VITAMIN D3) 1250 MCG
1 CAPSULE ORAL WEEKLY
Qty: 4 CAPSULE | Refills: 0 | OUTPATIENT
Start: 2025-08-27

## 2025-08-27 RX ORDER — CHOLECALCIFEROL (VITAMIN D3) 1250 MCG
1 CAPSULE ORAL WEEKLY
Qty: 13 CAPSULE | Refills: 3 | Status: SHIPPED | OUTPATIENT
Start: 2025-08-27

## 2025-08-30 LAB
APPEARANCE UR: CLEAR
BACTERIA #/AREA URNS HPF: ABNORMAL /[HPF]
BACTERIA UR CULT: ABNORMAL
BILIRUB UR QL STRIP: NEGATIVE
CASTS URNS QL MICRO: ABNORMAL /LPF
COLOR UR: YELLOW
EPI CELLS #/AREA URNS HPF: ABNORMAL /HPF (ref 0–10)
GLUCOSE UR QL STRIP: NEGATIVE
HGB UR QL STRIP: NEGATIVE
KETONES UR QL STRIP: NEGATIVE
LEUKOCYTE ESTERASE UR QL STRIP: ABNORMAL
MICRO URNS: ABNORMAL
NITRITE UR QL STRIP: NEGATIVE
PH UR STRIP: 6 [PH] (ref 5–7.5)
PROT UR QL STRIP: ABNORMAL
RBC #/AREA URNS HPF: ABNORMAL /HPF (ref 0–2)
SP GR UR STRIP: 1.02 (ref 1–1.03)
URINALYSIS REFLEX: ABNORMAL
UROBILINOGEN UR STRIP-MCNC: 1 MG/DL (ref 0.2–1)
WBC #/AREA URNS HPF: ABNORMAL /HPF (ref 0–5)

## 2025-09-03 ENCOUNTER — RESULTS FOLLOW-UP (OUTPATIENT)
Age: 60
End: 2025-09-03

## 2025-09-03 RX ORDER — CIPROFLOXACIN 250 MG/1
250 TABLET, FILM COATED ORAL 2 TIMES DAILY
Qty: 6 TABLET | Refills: 0 | Status: SHIPPED | OUTPATIENT
Start: 2025-09-03 | End: 2025-09-06